# Patient Record
Sex: FEMALE | Race: BLACK OR AFRICAN AMERICAN | NOT HISPANIC OR LATINO | Employment: FULL TIME | URBAN - METROPOLITAN AREA
[De-identification: names, ages, dates, MRNs, and addresses within clinical notes are randomized per-mention and may not be internally consistent; named-entity substitution may affect disease eponyms.]

---

## 2017-01-15 ENCOUNTER — HOSPITAL ENCOUNTER (EMERGENCY)
Facility: CLINIC | Age: 27
Discharge: HOME | End: 2017-01-15
Attending: EMERGENCY MEDICINE | Admitting: EMERGENCY MEDICINE
Payer: COMMERCIAL

## 2017-01-15 VITALS
HEART RATE: 90 BPM | RESPIRATION RATE: 12 BRPM | SYSTOLIC BLOOD PRESSURE: 123 MMHG | BODY MASS INDEX: 23.3 KG/M2 | WEIGHT: 145 LBS | DIASTOLIC BLOOD PRESSURE: 81 MMHG | OXYGEN SATURATION: 99 % | TEMPERATURE: 98 F | HEIGHT: 66 IN

## 2017-01-15 DIAGNOSIS — J02.0 ACUTE STREPTOCOCCAL PHARYNGITIS: ICD-10-CM

## 2017-01-15 LAB
DEPRECATED S PYO AG THROAT QL EIA: ABNORMAL
MICRO REPORT STATUS: ABNORMAL
SPECIMEN SOURCE: ABNORMAL

## 2017-01-15 PROCEDURE — 87880 STREP A ASSAY W/OPTIC: CPT | Performed by: EMERGENCY MEDICINE

## 2017-01-15 PROCEDURE — 99283 EMERGENCY DEPT VISIT LOW MDM: CPT

## 2017-01-15 PROCEDURE — 25000130 H RX MED GY IP 250 OP 259 PS 637: Performed by: EMERGENCY MEDICINE

## 2017-01-15 RX ORDER — DIPHENHYDRAMINE HYDROCHLORIDE AND LIDOCAINE HYDROCHLORIDE AND ALUMINUM HYDROXIDE AND MAGNESIUM HYDRO
5-10 KIT EVERY 6 HOURS PRN
Qty: 237 ML | Refills: 1 | Status: SHIPPED | OUTPATIENT
Start: 2017-01-15 | End: 2019-06-15

## 2017-01-15 RX ORDER — AZITHROMYCIN 500 MG/1
TABLET, FILM COATED ORAL
Qty: 5 TABLET | Refills: 0 | Status: SHIPPED | OUTPATIENT
Start: 2017-01-15 | End: 2017-01-20

## 2017-01-15 RX ADMIN — Medication 10 MG: at 02:27

## 2017-01-15 ASSESSMENT — ENCOUNTER SYMPTOMS
FEVER: 0
SORE THROAT: 1
SHORTNESS OF BREATH: 0
COUGH: 1
VOICE CHANGE: 1

## 2017-01-15 NOTE — ED NOTES
"A&Ox4. ABC's intact. Pt c/o sore throat for about a week. Dry cough and pain in her chest when coughing. Describes it as \"discomfort\".   Took Mucinex about 2 hours ago.    "

## 2017-01-15 NOTE — ED AVS SNAPSHOT
[facility] Emergency Department    [address]    [facility] [address]    Phone:  [phone]    Fax:  [phone]                                       Rakel Shaw   MRN: [id number]    Department:  [facility] Emergency Department   Date of Visit:  1/15/2017           After Visit Summary Signature Page     I have received my discharge instructions, and my questions have been answered. I have discussed any challenges I see with this plan with the nurse or doctor.    ..........................................................................................................................................  Patient/Patient Representative Signature      ..........................................................................................................................................  Patient Representative Print Name and Relationship to Patient    ..................................................               ................................................  Date                                            Time    ..........................................................................................................................................  Reviewed by Signature/Title    ...................................................              ..............................................  Date                                                            Time

## 2017-01-15 NOTE — ED PROVIDER NOTES
"  History     Chief Complaint:  Pharyngitis      HPI   Rakel Shaw is an otherwise healthy 26 year old female who presents with sore throat and cough.  The patient states one week ago she developed a sore throat.  She reports yesterday she had onset of cough and late last night (1/14) she began to lose her voice.  The patient reports this morning whenever she would cough her chest would hurt and she decided to present to the ED.  She currently rates her pain as 2/10.  She states she has taken Mucinex for her symptoms.  The patient states she has similar sick contacts at home.  She denies any current chest pain or shortness of breath.  She denies fever.      Allergies:  Penicillins     Medications:    The patient is currently on no regular medications.      Past Medical History:    History reviewed.  No significant past medical history.     Past Surgical History:    History reviewed.  No significant past surgical history.      Family History:  History reviewed.  No significant family history.     Social History:  Relationship status: Single  The patient denies smoking.   The patient denies alcohol use.   The patient presents with a male .     Review of Systems   Constitutional: Negative for fever.   HENT: Positive for sore throat and voice change.    Respiratory: Positive for cough. Negative for shortness of breath.    Cardiovascular: Negative for chest pain.   All other systems reviewed and are negative.      Physical Exam   First Vitals:  BP: 123/81 mmHg  Heart Rate: 96  Temp: 98  F (36.7  C)  Resp: 18  Height: 167.6 cm (5' 6\")  Weight: 65.772 kg (145 lb)  SpO2: 99 %      Physical Exam   Constitutional: She appears well-developed and well-nourished.   HENT:   Right Ear: Tympanic membrane normal.   Left Ear: Tympanic membrane normal.   Mouth/Throat: Mucous membranes are normal. Posterior oropharyngeal edema and posterior oropharyngeal erythema present. No oropharyngeal exudate or tonsillar abscesses. "   Cardiovascular: Normal rate.    Pulmonary/Chest: Effort normal. No respiratory distress. She has no wheezes.   Nursing note and vitals reviewed.      Emergency Department Course     Laboratory:  Rapid strep screen: Positive    Interventions:  0227 Decadron, 10 mg, PO    ED Course:  Nursing notes and past medical history reviewed.   I performed a physical examination of the patient as documented above.  I explained the plan with the patient who consents to this.   The patient underwent the workup as described above.    I personally reviewed the laboratory results with the Patient and answered all related questions prior to discharge.   Findings and plan explained to the Patient. Patient discharged home with instructions regarding supportive care, medications, and reasons to return. The importance of close follow-up was reviewed.     Impression & Plan      Medical Decision Making:  Rakel Shaw is a 26 year old female who presents to the emergency department today with sore throat and cough.  The patient is hoarse, I suspect laryngitis; however, due to red swollen tonsils a rapid strep test was performed and is positive.   We will treat strep and have her follow up in the clinic.     Diagnosis:    ICD-10-CM   1. Acute streptococcal pharyngitis J02.0       Disposition:   Discharge to home with primary care follow up.     Discharge Medications:    AZITHROMYCIN (ZITHROMAX) 500 MG TABLET    500mg daily for 5 days    DPH-LIDO-ALHYDR-MGHYDR-SIMETH (FIRST-MOUTHWASH BLM) SUSP    Swish and swallow 5-10 mLs in mouth every 6 hours as needed         Zach ZULETA am serving as a scribe on 1/15/2017 at 1:46 AM to personally document services performed by Vignesh Troncoso MD, based on my observations and the provider's statements to me.            Vignesh Troncoso MD  01/16/17 7307

## 2017-01-15 NOTE — ED AVS SNAPSHOT
[facility] Emergency Department    [address] [place] [address]    Phone:  [phone]    Fax:  [phone]                                       Rakel Shaw   MRN: [id number]    Department:  [facility] Emergency Department   Date of Visit:  1/15/2017           Patient Information     Date Of Birth          1990        Your diagnoses for this visit were:     Acute streptococcal pharyngitis        You were seen by Vignesh Troncoso MD.      Follow-up Information     Follow up with Clinic, [facility] In 1 week.    Why:  As needed    Contact information:    [address] [facility] [id number] [phone]          Discharge Instructions         Pharyngitis: Strep (Confirmed)     You have had a positive test for strep throat. Strep throat is a contagious illness. It is spread by coughing, kissing or by touching others after touching your mouth or nose. Symptoms include throat pain which is worse with swallowing, aching all over, headache and fever. It is treated with antibiotic medication. This should help you start to feel better within 1-2 days.  Home care    Rest at home. Drink plenty of fluids to avoid dehydration.    No work or school for the first 2 days of taking the antibiotics. After this time, you will not be contagious. You can then return to school or work if you are feeling better.     The antibiotic medication must be taken for the full 10 days, even if you feel better. This is very important to ensure the infection is treated. It is also important to prevent drug-resistent organisms from developing. If you were given an antibiotic shot, no more antibiotics are needed.    You may use acetaminophen (Tylenol) or ibuprofen (Motrin, Advil) to control pain or fever, unless another medicine was prescribed for this. (NOTE: If you have chronic liver or kidney disease or ever had a stomach ulcer or GI bleeding, talk with your doctor  before using these medicines.)    Throat lozenges or sprays (such as Chloraseptic) help reduce pain. Gargling with warm salt water will also reduce throat pain. Dissolve 1/2 teaspoon of salt in 1 glass of warm water. This may be useful just before meals.     Soft foods are okay. Avoid salty or spicy foods.  Follow-up care  Follow up with your healthcare provider or our staff if you are not improving over the next week.  When to seek medical advice  Call your healthcare provider right away if any of these occur:    Fever as directed by your doctor     New or worsening ear pain, sinus pain, or headache    Painful lumps in the back of neck    Stiff neck    Lymph nodes are getting larger or becoming soft in the middle    Inability to swallow liquids, excessive drooling, or inability to open mouth wide due to throat pain    Signs of dehydration (very dark urine or no urine, sunken eyes, dizziness)    Trouble breathing or noisy breathing    Muffled voice    New rash    6815-5539 The [employer]. [address] [place] [address]. All rights reserved. This information is not intended as a substitute for professional medical care. Always follow your healthcare professional's instructions.          24 Hour Appointment Hotline       To make an appointment at any [facility] clinic, call [phone] ([phone]). If you don't have a family doctor or clinic, we will help you find one. [facility] clinics are conveniently located to serve the needs of you and your family.             Review of your medicines      START taking        Dose / Directions Last dose taken    azithromycin 500 MG tablet   Commonly known as:  ZITHROMAX Z-DIALLO   Quantity:  5 tablet        500mg daily for 5 days   Refills:  0        FIRST-MOUTHWASH BLM Susp   Dose:  5-10 mL   Quantity:  237 mL        Swish and swallow 5-10 mLs in mouth every 6 hours as needed   Refills:  1          Our records show that you are taking the medicines listed  below. If these are incorrect, please call your family doctor or clinic.        Dose / Directions Last dose taken    NO ACTIVE MEDICATIONS        .   Refills:  0        POLYTRIM ophthalmic solution   Quantity:  QS   Generic drug:  trimethoprim-polymyxin b        1-2 gtt OU every 2-3hours x 24 hours, then QID x 5-7 days   Refills:  0                Prescriptions were sent or printed at these locations (2 Prescriptions)                   Other Prescriptions                Printed at Department/Unit printer (2 of 2)         DPH-Lido-AlHydr-MgHydr-Simeth (FIRST-MOUTHWASH BLM) SUSP               azithromycin (ZITHROMAX) 500 MG tablet                Procedures and tests performed during your visit     Rapid strep screen      Orders Needing Specimen Collection     None      Pending Results     No orders found from 1/14/2017 to 1/16/2017.            Pending Culture Results     No orders found from 1/14/2017 to 1/16/2017.       Test Results from your hospital stay           1/15/2017  2:17 AM - Interface, [employer] Results      Component Results     Component    Specimen Description    Throat    Rapid Strep A Screen (Abnormal)    POSITIVE: Group A Streptococcal antigen detected by immunoassay.    Micro Report Status    FINAL 01/15/2017                Clinical Quality Measure: Blood Pressure Screening     Your blood pressure was checked while you were in the emergency department today. The last reading we obtained was  BP: 123/81 mmHg . Please read the guidelines below about what these numbers mean and what you should do about them.  If your systolic blood pressure (the top number) is less than 120 and your diastolic blood pressure (the bottom number) is less than 80, then your blood pressure is normal. There is nothing more that you need to do about it.  If your systolic blood pressure (the top number) is 120-139 or your diastolic blood pressure (the bottom number) is 80-89, your blood pressure may be higher than it should  "be. You should have your blood pressure rechecked within a year by a primary care provider.  If your systolic blood pressure (the top number) is 140 or greater or your diastolic blood pressure (the bottom number) is 90 or greater, you may have high blood pressure. High blood pressure is treatable, but if left untreated over time it can put you at risk for heart attack, stroke, or kidney failure. You should have your blood pressure rechecked by a primary care provider within the next 4 weeks.  If your provider in the emergency department today gave you specific instructions to follow-up with your doctor or provider even sooner than that, you should follow that instruction and not wait for up to 4 weeks for your follow-up visit.        Thank you for choosing [facility]       Thank you for choosing [facility] for your care. Our goal is always to provide you with excellent care. Hearing back from our patients is one way we can continue to improve our services. Please take a few minutes to complete the written survey that you may receive in the mail after you visit with us. Thank you!        [employer] Information     [employer] lets you send messages to your doctor, view your test results, renew your prescriptions, schedule appointments and more. To sign up, go to [email or web address] . Click on \"Log in\" on the left side of the screen, which will take you to the Welcome page. Then click on \"Sign up Now\" on the right side of the page.     You will be asked to enter the access code listed below, as well as some personal information. Please follow the directions to create your username and password.     Your access code is: [email or web address]  Expires: 4/15/2017  2:27 AM     Your access code will  in 90 days. If you need help or a new code, please call your [facility] clinic or [phone].        Care EveryWhere ID     This is your Care EveryWhere ID. This could be used by other organizations to access your [facility] medical " records  [id number]        After Visit Summary       This is your record. Keep this with you and show to your community pharmacist(s) and doctor(s) at your next visit.

## 2017-01-15 NOTE — DISCHARGE INSTRUCTIONS
Pharyngitis: Strep (Confirmed)     You have had a positive test for strep throat. Strep throat is a contagious illness. It is spread by coughing, kissing or by touching others after touching your mouth or nose. Symptoms include throat pain which is worse with swallowing, aching all over, headache and fever. It is treated with antibiotic medication. This should help you start to feel better within 1-2 days.  Home care    Rest at home. Drink plenty of fluids to avoid dehydration.    No work or school for the first 2 days of taking the antibiotics. After this time, you will not be contagious. You can then return to school or work if you are feeling better.     The antibiotic medication must be taken for the full 10 days, even if you feel better. This is very important to ensure the infection is treated. It is also important to prevent drug-resistent organisms from developing. If you were given an antibiotic shot, no more antibiotics are needed.    You may use acetaminophen (Tylenol) or ibuprofen (Motrin, Advil) to control pain or fever, unless another medicine was prescribed for this. (NOTE: If you have chronic liver or kidney disease or ever had a stomach ulcer or GI bleeding, talk with your doctor before using these medicines.)    Throat lozenges or sprays (such as Chloraseptic) help reduce pain. Gargling with warm salt water will also reduce throat pain. Dissolve 1/2 teaspoon of salt in 1 glass of warm water. This may be useful just before meals.     Soft foods are okay. Avoid salty or spicy foods.  Follow-up care  Follow up with your healthcare provider or our staff if you are not improving over the next week.  When to seek medical advice  Call your healthcare provider right away if any of these occur:    Fever as directed by your doctor     New or worsening ear pain, sinus pain, or headache    Painful lumps in the back of neck    Stiff neck    Lymph nodes are getting larger or becoming soft in the  middle    Inability to swallow liquids, excessive drooling, or inability to open mouth wide due to throat pain    Signs of dehydration (very dark urine or no urine, sunken eyes, dizziness)    Trouble breathing or noisy breathing    Muffled voice    New rash    1996-9250 [employer]. [address] [place] [address]. All rights reserved. This information is not intended as a substitute for professional medical care. Always follow your healthcare professional's instructions.

## 2019-06-15 ENCOUNTER — HOSPITAL ENCOUNTER (INPATIENT)
Facility: CLINIC | Age: 29
LOS: 1 days | Discharge: HOME | DRG: 558 | End: 2019-06-17
Attending: EMERGENCY MEDICINE | Admitting: INTERNAL MEDICINE
Payer: COMMERCIAL

## 2019-06-15 DIAGNOSIS — M62.82 NON-TRAUMATIC RHABDOMYOLYSIS: ICD-10-CM

## 2019-06-15 DIAGNOSIS — T79.6XXA TRAUMATIC RHABDOMYOLYSIS, INITIAL ENCOUNTER (H): Primary | ICD-10-CM

## 2019-06-15 LAB
ALBUMIN UR-MCNC: NEGATIVE MG/DL
ANION GAP SERPL CALCULATED.3IONS-SCNC: 7 MMOL/L (ref 3–14)
APPEARANCE UR: CLEAR
BACTERIA #/AREA URNS HPF: ABNORMAL /HPF
BILIRUB UR QL STRIP: NEGATIVE
BUN SERPL-MCNC: 10 MG/DL (ref 7–30)
CALCIUM SERPL-MCNC: 8.7 MG/DL (ref 8.5–10.1)
CHLORIDE SERPL-SCNC: 105 MMOL/L (ref 94–109)
CK SERPL-CCNC: ABNORMAL U/L (ref 30–225)
CO2 SERPL-SCNC: 28 MMOL/L (ref 20–32)
COLOR UR AUTO: YELLOW
CREAT SERPL-MCNC: 0.73 MG/DL (ref 0.52–1.04)
GFR SERPL CREATININE-BSD FRML MDRD: >90 ML/MIN/{1.73_M2}
GLUCOSE SERPL-MCNC: 87 MG/DL (ref 70–99)
GLUCOSE UR STRIP-MCNC: NEGATIVE MG/DL
HGB UR QL STRIP: ABNORMAL
KETONES UR STRIP-MCNC: NEGATIVE MG/DL
LEUKOCYTE ESTERASE UR QL STRIP: NEGATIVE
MUCOUS THREADS #/AREA URNS LPF: PRESENT /LPF
NITRATE UR QL: NEGATIVE
PH UR STRIP: 5.5 PH (ref 5–7)
POTASSIUM SERPL-SCNC: 4 MMOL/L (ref 3.4–5.3)
RBC #/AREA URNS AUTO: 9 /HPF (ref 0–2)
SODIUM SERPL-SCNC: 140 MMOL/L (ref 133–144)
SOURCE: ABNORMAL
SP GR UR STRIP: 1.03 (ref 1–1.03)
SQUAMOUS #/AREA URNS AUTO: 1 /HPF (ref 0–1)
UROBILINOGEN UR STRIP-MCNC: 2 MG/DL (ref 0–2)
WBC #/AREA URNS AUTO: 1 /HPF (ref 0–5)

## 2019-06-15 PROCEDURE — 96361 HYDRATE IV INFUSION ADD-ON: CPT

## 2019-06-15 PROCEDURE — 85652 RBC SED RATE AUTOMATED: CPT | Performed by: PHYSICIAN ASSISTANT

## 2019-06-15 PROCEDURE — 82550 ASSAY OF CK (CPK): CPT | Performed by: EMERGENCY MEDICINE

## 2019-06-15 PROCEDURE — 25000132 ZZH RX MED GY IP 250 OP 250 PS 637: Performed by: EMERGENCY MEDICINE

## 2019-06-15 PROCEDURE — 86140 C-REACTIVE PROTEIN: CPT | Performed by: PHYSICIAN ASSISTANT

## 2019-06-15 PROCEDURE — 99285 EMERGENCY DEPT VISIT HI MDM: CPT | Mod: 25

## 2019-06-15 PROCEDURE — 96360 HYDRATION IV INFUSION INIT: CPT

## 2019-06-15 PROCEDURE — 82550 ASSAY OF CK (CPK): CPT | Performed by: PHYSICIAN ASSISTANT

## 2019-06-15 PROCEDURE — 36415 COLL VENOUS BLD VENIPUNCTURE: CPT | Performed by: PHYSICIAN ASSISTANT

## 2019-06-15 PROCEDURE — 80048 BASIC METABOLIC PNL TOTAL CA: CPT | Performed by: EMERGENCY MEDICINE

## 2019-06-15 PROCEDURE — 81001 URINALYSIS AUTO W/SCOPE: CPT | Performed by: EMERGENCY MEDICINE

## 2019-06-15 PROCEDURE — 25800030 ZZH RX IP 258 OP 636: Performed by: EMERGENCY MEDICINE

## 2019-06-15 RX ORDER — SPIRONOLACTONE 50 MG/1
50 TABLET, FILM COATED ORAL DAILY
COMMUNITY
End: 2020-01-24

## 2019-06-15 RX ORDER — IBUPROFEN 600 MG/1
600 TABLET, FILM COATED ORAL ONCE
Status: COMPLETED | OUTPATIENT
Start: 2019-06-15 | End: 2019-06-15

## 2019-06-15 RX ORDER — ACETAMINOPHEN 500 MG
1000 TABLET ORAL ONCE
Status: COMPLETED | OUTPATIENT
Start: 2019-06-15 | End: 2019-06-15

## 2019-06-15 RX ORDER — MINOCYCLINE HYDROCHLORIDE 100 MG/1
100 TABLET ORAL 2 TIMES DAILY
COMMUNITY
End: 2020-01-24

## 2019-06-15 RX ORDER — ONDANSETRON 2 MG/ML
INJECTION INTRAMUSCULAR; INTRAVENOUS
Status: DISCONTINUED
Start: 2019-06-15 | End: 2019-06-15 | Stop reason: HOSPADM

## 2019-06-15 RX ADMIN — ACETAMINOPHEN 1000 MG: 500 TABLET, FILM COATED ORAL at 19:20

## 2019-06-15 RX ADMIN — SODIUM CHLORIDE, POTASSIUM CHLORIDE, SODIUM LACTATE AND CALCIUM CHLORIDE 1000 ML: 600; 310; 30; 20 INJECTION, SOLUTION INTRAVENOUS at 21:44

## 2019-06-15 RX ADMIN — IBUPROFEN 600 MG: 600 TABLET ORAL at 19:20

## 2019-06-15 ASSESSMENT — ENCOUNTER SYMPTOMS: ARTHRALGIAS: 1

## 2019-06-15 ASSESSMENT — MIFFLIN-ST. JEOR: SCORE: 1512.75

## 2019-06-15 NOTE — ED TRIAGE NOTES
Presents to the ED with left arm pain and stiffness. Denies specific known injury, but states began after doing cross fit.

## 2019-06-15 NOTE — ED PROVIDER NOTES
History     Chief Complaint:  Arm Pain    HPI   Rakel Shaw is a 28 year old left hand dominant female who presents with left arm pain and stiffness. The patient denies any specific known injury, but does state is started after doing cross fit yesterday. She states that she slept on it, iced it, and took tylenol and ibuprofen but it did not seem to get better which is why she reports to the ED today. The patient states that the left elbow does not bend, and feels that it is tight.    Allergies:  Penicillins     Medications:    The patient is not currently taking any prescribed medications.    Past Medical History:    Anemia  Fibroadenoma of left breast    Past Surgical History:    The patient does not have any pertinent past surgical history.    Family History:    Heart Disease  HTN   Cancer  Diabetes  Thyroid    Social History:  Negative for tobacco use.  Negative for alcohol use.  Marital Status:  Single [1]       Review of Systems   Musculoskeletal: Positive for arthralgias (left arm).   All other systems reviewed and are negative.        Physical Exam     Patient Vitals for the past 24 hrs:   BP Temp Pulse Resp SpO2   06/15/19 1830 142/75 97.9  F (36.6  C) 68 16 98 %       Physical Exam    HEENT:  mmm  Neck: supple  CV: ppi, regular   Resp: speaking in full sentences with any resp distress    Ext: Skeletal survey unremarkable with exception of left upper extremity where there is tenderness over the triceps muscle of the left upper extremity.  Full extension at the elbow range of motion intact at the shoulder but limited flexion to 30 degrees secondary to pain.  Distal CMS intact compartments are not full or tense.  No elbow joint effusion.  Skin: warm dry well perfused  Neuro: Alert, no gross motor or sensory deficits,  gait stable        Emergency Department Course   Laboratory:  BMP: Glucose 87, o/w WNL (Creatinine: 0.73)    CK Total: >17k    Labs Ordered and Resulted from Time of ED Arrival Up to the Time of  Departure from the ED   CK TOTAL - Abnormal; Notable for the following components:       Result Value    CK Total 17,692 (*)     All other components within normal limits   ROUTINE UA WITH MICROSCOPIC - Abnormal; Notable for the following components:    Blood Urine Small (*)     RBC Urine 9 (*)     Bacteria Urine Few (*)     Mucous Urine Present (*)     All other components within normal limits   BASIC METABOLIC PANEL         Interventions:  1920 Tylenol 1000 mg PO   Ibuprofen 600 mg PO    Emergency Department Course:  Nursing notes and vitals reviewed. (190) I performed an exam of the patient as documented above.      IV inserted. Medicine administered as documented above. Blood drawn. This was sent to the lab for further testing, results above.         Impression & Plan    Medical Decision Makin-year-old female here with likely overuse injury with tenderness and tightness of the left triceps muscle.  There is also tenderness of the right upper extremity over the triceps but there is no limited range of motion.  Concern was for rhabdomyolysis which unfortunately he is present with a CK of greater than 17,000.  Fortunately her BMP shows no kidney injury and her urine only has small blood.Given that level of CK rising putting her in the.  She was comfortable and agree with that plan.  Area for potential kidney injury recommended admission for aggressive hydration          Diagnosis:    ICD-10-CM    1. Non-traumatic rhabdomyolysis M62.82 CK total     CRP inflammation     CRP inflammation     Erythrocyte sedimentation rate auto     Erythrocyte sedimentation rate auto     CANCELED: CRP inflammation     CANCELED: Erythrocyte sedimentation rate auto       Disposition:  Admitted    Discharge Medications:     Medication List      There are no discharge medications for this visit.       Scribe Disclosure:  Ciera ZULETA, am serving as a scribe on 6/15/2019 at 6:33 PM to personally document services performed by  Adriano Tai, * based on my observations and the provider's statements to me.       Ciera Monson  6/15/2019   [facility] EMERGENCY DEPARTMENT      Adriano Tai MD  06/16/19 0103

## 2019-06-16 ENCOUNTER — APPOINTMENT (OUTPATIENT)
Dept: ULTRASOUND IMAGING | Facility: CLINIC | Age: 29
DRG: 558 | End: 2019-06-16
Attending: PHYSICIAN ASSISTANT
Payer: COMMERCIAL

## 2019-06-16 PROBLEM — M62.82 RHABDOMYOLYSIS: Status: ACTIVE | Noted: 2019-06-16

## 2019-06-16 LAB
ALBUMIN SERPL-MCNC: 2.8 G/DL (ref 3.4–5)
ALBUMIN UR-MCNC: NEGATIVE MG/DL
ALP SERPL-CCNC: 42 U/L (ref 40–150)
ALT SERPL W P-5'-P-CCNC: 87 U/L (ref 0–50)
ANION GAP SERPL CALCULATED.3IONS-SCNC: 7 MMOL/L (ref 3–14)
APPEARANCE UR: CLEAR
AST SERPL W P-5'-P-CCNC: 360 U/L (ref 0–45)
BACTERIA #/AREA URNS HPF: ABNORMAL /HPF
BILIRUB SERPL-MCNC: 0.7 MG/DL (ref 0.2–1.3)
BILIRUB UR QL STRIP: NEGATIVE
BUN SERPL-MCNC: 7 MG/DL (ref 7–30)
CALCIUM SERPL-MCNC: 8.2 MG/DL (ref 8.5–10.1)
CHLORIDE SERPL-SCNC: 109 MMOL/L (ref 94–109)
CK SERPL-CCNC: ABNORMAL U/L (ref 30–225)
CO2 SERPL-SCNC: 25 MMOL/L (ref 20–32)
COLOR UR AUTO: ABNORMAL
CREAT SERPL-MCNC: 0.63 MG/DL (ref 0.52–1.04)
CRP SERPL-MCNC: <2.9 MG/L (ref 0–8)
ERYTHROCYTE [SEDIMENTATION RATE] IN BLOOD BY WESTERGREN METHOD: 9 MM/H (ref 0–20)
GFR SERPL CREATININE-BSD FRML MDRD: >90 ML/MIN/{1.73_M2}
GLUCOSE SERPL-MCNC: 86 MG/DL (ref 70–99)
GLUCOSE UR STRIP-MCNC: NEGATIVE MG/DL
HGB UR QL STRIP: NEGATIVE
KETONES UR STRIP-MCNC: NEGATIVE MG/DL
LEUKOCYTE ESTERASE UR QL STRIP: NEGATIVE
NITRATE UR QL: NEGATIVE
PH UR STRIP: 7 PH (ref 5–7)
POTASSIUM SERPL-SCNC: 3.9 MMOL/L (ref 3.4–5.3)
PROT SERPL-MCNC: 6.4 G/DL (ref 6.8–8.8)
RBC #/AREA URNS AUTO: 1 /HPF (ref 0–2)
SODIUM SERPL-SCNC: 141 MMOL/L (ref 133–144)
SOURCE: ABNORMAL
SP GR UR STRIP: 1.01 (ref 1–1.03)
UROBILINOGEN UR STRIP-MCNC: NORMAL MG/DL (ref 0–2)
WBC #/AREA URNS AUTO: 0 /HPF (ref 0–5)

## 2019-06-16 PROCEDURE — 25000132 ZZH RX MED GY IP 250 OP 250 PS 637: Performed by: PHYSICIAN ASSISTANT

## 2019-06-16 PROCEDURE — 12000011 ZZH R&B MS OVERFLOW

## 2019-06-16 PROCEDURE — 81001 URINALYSIS AUTO W/SCOPE: CPT | Performed by: PHYSICIAN ASSISTANT

## 2019-06-16 PROCEDURE — 25800030 ZZH RX IP 258 OP 636: Performed by: PHYSICIAN ASSISTANT

## 2019-06-16 PROCEDURE — 96360 HYDRATION IV INFUSION INIT: CPT

## 2019-06-16 PROCEDURE — 96361 HYDRATE IV INFUSION ADD-ON: CPT

## 2019-06-16 PROCEDURE — 36415 COLL VENOUS BLD VENIPUNCTURE: CPT | Performed by: PHYSICIAN ASSISTANT

## 2019-06-16 PROCEDURE — 80053 COMPREHEN METABOLIC PANEL: CPT | Performed by: PHYSICIAN ASSISTANT

## 2019-06-16 PROCEDURE — 99207 ZZC CDG-CODE CATEGORY CHANGED: CPT | Performed by: PHYSICIAN ASSISTANT

## 2019-06-16 PROCEDURE — 25000128 H RX IP 250 OP 636: Performed by: PHYSICIAN ASSISTANT

## 2019-06-16 PROCEDURE — G0378 HOSPITAL OBSERVATION PER HR: HCPCS

## 2019-06-16 PROCEDURE — 82550 ASSAY OF CK (CPK): CPT | Performed by: PHYSICIAN ASSISTANT

## 2019-06-16 PROCEDURE — 99222 1ST HOSP IP/OBS MODERATE 55: CPT | Mod: AI | Performed by: PHYSICIAN ASSISTANT

## 2019-06-16 PROCEDURE — 93971 EXTREMITY STUDY: CPT | Mod: LT

## 2019-06-16 RX ORDER — ONDANSETRON 4 MG/1
4 TABLET, ORALLY DISINTEGRATING ORAL EVERY 6 HOURS PRN
Status: DISCONTINUED | OUTPATIENT
Start: 2019-06-16 | End: 2019-06-17 | Stop reason: HOSPADM

## 2019-06-16 RX ORDER — SODIUM CHLORIDE 9 MG/ML
INJECTION, SOLUTION INTRAVENOUS CONTINUOUS
Status: DISCONTINUED | OUTPATIENT
Start: 2019-06-16 | End: 2019-06-17 | Stop reason: HOSPADM

## 2019-06-16 RX ORDER — NALOXONE HYDROCHLORIDE 0.4 MG/ML
.1-.4 INJECTION, SOLUTION INTRAMUSCULAR; INTRAVENOUS; SUBCUTANEOUS
Status: DISCONTINUED | OUTPATIENT
Start: 2019-06-16 | End: 2019-06-17 | Stop reason: HOSPADM

## 2019-06-16 RX ORDER — IBUPROFEN 600 MG/1
600 TABLET, FILM COATED ORAL EVERY 6 HOURS PRN
Status: DISCONTINUED | OUTPATIENT
Start: 2019-06-16 | End: 2019-06-16

## 2019-06-16 RX ORDER — AMOXICILLIN 250 MG
1 CAPSULE ORAL 2 TIMES DAILY
Status: DISCONTINUED | OUTPATIENT
Start: 2019-06-16 | End: 2019-06-17 | Stop reason: HOSPADM

## 2019-06-16 RX ORDER — ACETAMINOPHEN 325 MG/1
650 TABLET ORAL EVERY 4 HOURS PRN
Status: DISCONTINUED | OUTPATIENT
Start: 2019-06-16 | End: 2019-06-16

## 2019-06-16 RX ORDER — ONDANSETRON 2 MG/ML
4 INJECTION INTRAMUSCULAR; INTRAVENOUS EVERY 6 HOURS PRN
Status: DISCONTINUED | OUTPATIENT
Start: 2019-06-16 | End: 2019-06-17 | Stop reason: HOSPADM

## 2019-06-16 RX ORDER — OXYCODONE HYDROCHLORIDE 5 MG/1
5 TABLET ORAL EVERY 4 HOURS PRN
Status: DISCONTINUED | OUTPATIENT
Start: 2019-06-16 | End: 2019-06-17 | Stop reason: HOSPADM

## 2019-06-16 RX ORDER — AMOXICILLIN 250 MG
2 CAPSULE ORAL 2 TIMES DAILY
Status: DISCONTINUED | OUTPATIENT
Start: 2019-06-16 | End: 2019-06-17 | Stop reason: HOSPADM

## 2019-06-16 RX ADMIN — SODIUM CHLORIDE 1000 ML: 9 INJECTION, SOLUTION INTRAVENOUS at 12:49

## 2019-06-16 RX ADMIN — SODIUM CHLORIDE: 9 INJECTION, SOLUTION INTRAVENOUS at 14:59

## 2019-06-16 RX ADMIN — IBUPROFEN 600 MG: 600 TABLET ORAL at 09:16

## 2019-06-16 RX ADMIN — SODIUM CHLORIDE: 9 INJECTION, SOLUTION INTRAVENOUS at 20:54

## 2019-06-16 RX ADMIN — IBUPROFEN 600 MG: 600 TABLET ORAL at 00:36

## 2019-06-16 RX ADMIN — SODIUM CHLORIDE: 9 INJECTION, SOLUTION INTRAVENOUS at 00:36

## 2019-06-16 RX ADMIN — SENNOSIDES AND DOCUSATE SODIUM 1 TABLET: 8.6; 5 TABLET ORAL at 09:05

## 2019-06-16 RX ADMIN — SENNOSIDES AND DOCUSATE SODIUM 1 TABLET: 8.6; 5 TABLET ORAL at 20:49

## 2019-06-16 RX ADMIN — IBUPROFEN 600 MG: 600 TABLET ORAL at 16:58

## 2019-06-16 NOTE — PROVIDER NOTIFICATION
Notified provider via text page of critical lab result CK 13,520. This result is lower then was but text page sent to update provider.

## 2019-06-16 NOTE — PLAN OF CARE
VSS, A/O. Independent in room. C/O LUE pain, ibuprofen given, oxy available and acetaminophen d/c'd due to elevated LFTs. IVF increased to 200ml/h.  1L NS bolus ordered, CK increased tro 19,673. Ortho sx consulted.

## 2019-06-16 NOTE — PLAN OF CARE
ROOM # 233    Living Situation (if not independent, order SW consult):Ind  Facility name:  : father Cassidy    Activity level at baseline: ind  Activity level on admit: ind      Patient registered to observation; given Patient Bill of Rights; given the opportunity to ask questions about observation status and their plan of care.  Patient has been oriented to the observation room, bathroom and call light is in place.    Discussed discharge goals and expectations with patient/family.

## 2019-06-16 NOTE — ED NOTES
"[facility]  ED Nurse Handoff Report    Rakel Shaw is a 28 year old female   ED Chief complaint: Arm Pain  . ED Diagnosis:   Final diagnoses:   Non-traumatic rhabdomyolysis     Allergies:   Allergies   Allergen Reactions     Penicillins        Code Status: Full Code  Activity level - Baseline/Home:  Independent. Activity Level - Current:   Independent. Lift room needed: No. Bariatric: No   Needed: No   Isolation: No. Infection: Not Applicable.     Vital Signs:   Vitals:    06/15/19 1830 06/15/19 2200   BP: 142/75 132/84   Pulse: 68 68   Resp: 16    Temp: 97.9  F (36.6  C)    SpO2: 98%        Cardiac Rhythm:  ,      Pain level: 0-10 Pain Scale: 8  Patient confused: No. Patient Falls Risk: No.   Elimination Status: Has voided   Patient Report - Initial Complaint: Arm pain. Focused Assessment:    Presents to the ED with left arm pain and stiffness. Denies specific known injury, but states began after doing cross fit.      Pt reports unable to perform abduction on LUE, pt reporting pain in L bicep, pt reports for that she has been doing \"intense\" crossfit workouts the last few days; CMS intact   Tests Performed:   No orders to display     . Abnormal Results:   Labs Ordered and Resulted from Time of ED Arrival Up to the Time of Departure from the ED   CK TOTAL - Abnormal; Notable for the following components:       Result Value    CK Total 17,692 (*)     All other components within normal limits   ROUTINE UA WITH MICROSCOPIC - Abnormal; Notable for the following components:    Blood Urine Small (*)     RBC Urine 9 (*)     Bacteria Urine Few (*)     Mucous Urine Present (*)     All other components within normal limits   BASIC METABOLIC PANEL     .   Treatments provided: IVF, labs   Family Comments: Family at bedside   OBS brochure/video discussed/provided to patient:  Yes  ED Medications:   Medications   0.9% sodium chloride BOLUS (has no administration in time range)   lactated ringers BOLUS 1,000 " mL (1,000 mLs Intravenous New Bag 6/15/19 2149)   ondansetron (ZOFRAN) 2 MG/ML injection (has no administration in time range)   acetaminophen (TYLENOL) tablet 1,000 mg (1,000 mg Oral Given 6/15/19 1920)   ibuprofen (ADVIL/MOTRIN) tablet 600 mg (600 mg Oral Given 6/15/19 1920)     Drips infusing:  Yes  For the majority of the shift, the patient's behavior Green. Interventions performed were N/A.     Severe Sepsis OR Septic Shock Diagnosis Present: No      ED Nurse Name/Phone Number: Criss GEOVANNA Cramer,   10:38 PM    RECEIVING UNIT ED HANDOFF REVIEW    Above ED Nurse Handoff Report was reviewed: Yes  Reviewed by: Sp Guerrero on Jane 15, 2019 at 10:50 PM

## 2019-06-16 NOTE — H&P
History and Physical     Rakel Shaw MRN# [id number]   YOB: 1990 Age: 28 year old      Date of Admission:  6/15/2019    Primary care provider: [facility], [place]          Assessment and Plan:   Rakel Shaw is a 28 year old female with a PMH significant for iron deficiency anemia (not currently on supplements) and o/w healthy female (no hormones,not on any supplements, who presents with waxing and waning left arm pain since thurs. Today she noticed that her left arm was so swollen down to her hands and her triceps were particularly painful to move and tender to touch to the point that she could not bend her arm. She has been consistently working on [employer] for about a year and has been progressively using heavier weights etc. She doesn't remember injuring her left arm per se but her  has told her that her left arm is weaker than the other.   She came to ED and labs reveal a significantly high CK suggestive of rhabdomyolysis.  Fortunately no renal involvement. I have been asked to admit her for aggressive fluid resuscitation and observation of her CK trend and Cr status.     1.  Left arm pain with associated swelling-elevated CK suggestive of rhabdomyolysis but history still seems somewhat unclear.  She has been doing [employer] for quite some time and has not had any significant change though she is trying to increase her weights.  Currently there is no evidence of compartment syndrome.  She states that her symptoms have felt a lot better since receiving IV fluid.  I think for completeness sake I will order a Doppler ultrasound of the left arm just to make sure that this is not a DVT that is causing her pain.  Otherwise continue aggressive IV fluid hydration, follow CK and urinalysis in the morning.  I will also add sed rate and CRP to rule out potential myositis.    2.  History of iron deficiency anemia-patient states she is no longer taking iron.  Check her hemoglobin in the morning.      Registered to observation  CODE STATUS-full  DVT prophylaxis-anticipate short stay, ambulate                  Chief Complaint:   Left arm pain         History of Present Illness:   Rakel Shaw is a 28 year old female without significant past medical history except for iron deficiency anemia who presents with couple day complain of worsening left arm pain.  History obtained by speaking with the ER physician patient interview and chart review.  Patient states that she noticed some soreness in both of her arms on Thursday I will be at the left was little bit more severe.  She has been working out consistently at [employer] for the last year and over these last few sessions she has been increasing her weight during these exercises.  She did not think much of it until by yesterday evening she was having increasing swelling in her left arm mainly along the triceps along with swelling of her left hand.  Her left arm became quite tense and taut and was even painful to lift up or bend at the elbow.  Due to her extreme pain she comes into the emergency room.    Work-up in the emergency room was fairly unremarkable with the exception of a significantly elevated CK at 17,000.  She has no history of rhabdomyolysis in the past.  She is on no nutritional supplements, increase protein supplements either.  She is not on any birth control or hormones or any history of DVT.  She received a liter of IV fluid and since then she states that her pain has improved and the swelling and tenseness of the left arm have improved.             Past Medical History:   Iron deficiency anemia          Past Surgical History:   Reviewed           Social History:     Social History     Socioeconomic History     Marital status: Single     Spouse name: Not on file     Number of children: Not on file     Years of education: Not on file     Highest education level: Not on file   Occupational History     Not on file   Social Needs     Financial resource  "strain: Not on file     Food insecurity:     Worry: Not on file     Inability: Not on file     Transportation needs:     Medical: Not on file     Non-medical: Not on file   Tobacco Use     Smoking status: Never Smoker   Substance and Sexual Activity     Alcohol use: No     Drug use: No     Sexual activity: Not on file   Lifestyle     Physical activity:     Days per week: Not on file     Minutes per session: Not on file     Stress: Not on file   Relationships     Social connections:     Talks on phone: Not on file     Gets together: Not on file     Attends Orthodoxy service: Not on file     Active member of club or organization: Not on file     Attends meetings of clubs or organizations: Not on file     Relationship status: Not on file     Intimate partner violence:     Fear of current or ex partner: Not on file     Emotionally abused: Not on file     Physically abused: Not on file     Forced sexual activity: Not on file   Other Topics Concern     Not on file   Social History Narrative     Not on file               Family History:   Reviewed and non contributory         Allergies:      Allergies   Allergen Reactions     Penicillins                Medications:     Prior to Admission medications    Medication Sig Last Dose Taking? Auth Provider   minocycline (DYNACIN) 100 MG tablet Take 100 mg by mouth 2 times daily Past Week at Unknown time Yes Unknown, Entered By History   spironolactone (ALDACTONE) 50 MG tablet Take 50 mg by mouth daily Past Week at Unknown time Yes Unknown, Entered By History              Review of Systems:   A Comprehensive greater than 10 system review of systems was carried out.  Pertinent positives and negatives are noted above.  Otherwise negative for contributory information.            Physical Exam:   Blood pressure 117/74, pulse 66, temperature 98.2  F (36.8  C), temperature source Oral, resp. rate 16, height 1.676 m (5' 6\"), weight 76.1 kg (167 lb 12.3 oz), SpO2 100 %, not currently " breastfeeding.  Exam:  GENERAL:  Comfortable. Non toxic  PSYCH: pleasant, oriented, No acute distress.  HEENT:  PERRLA. Normal conjunctiva, normal hearing, nasal mucosa and Oropharynx are normal.  NECK:  Supple, no neck vein distention, adenopathy or bruits, normal thyroid.  HEART:  Normal S1, S2 with no murmur, no pericardial rub, gallops or S3 or S4.  LUNGS:  Clear to auscultation, normal Respiratory effort. No wheezing, rales or ronchi.  ABDOMEN:  Soft, no hepatosplenomegaly, normal bowel sounds. Non-tender, non distended.   EXTREMITIES:  No pedal edema, +2 pulses bilateral and equal. Left tricep with a sense of fullness but compartment is soft and no significant tenderness with palpation. Very slight increased warmth. Dec ROM around the left arm and left elbow but no actual pain in the elbow joint.   SKIN:  Dry to touch, No rash, wound or ulcerations.  NEUROLOGIC:  CN 2-12 intact, BL 5/5 symmetric upper and lower extremity strength, sensation is intact with no focal deficits.           Data:     No results for input(s): WBC, HGB, HCT, MCV, PLT in the last 168 hours.  Recent Labs   Lab 06/15/19  1916      POTASSIUM 4.0   CHLORIDE 105   CO2 28   ANIONGAP 7   GLC 87   BUN 10   CR 0.73   GFRESTIMATED >90   GFRESTBLACK >90   AYLEEN 8.7     No results for input(s): CULT in the last 168 hours.  Recent Labs   Lab 06/15/19  1916   CKT 17,692*     No results for input(s): SED, CRP in the last 168 hours.      Results for orders placed or performed in visit on 04/10/07   RT X-RAY KNEE 3 VIEW    Impression       History:    Reduction of patellar dislocation.     Findings: The patella is now properly located in the femoral sulcus.  No evidence of fracture. Small effusion.         Sabi Goins PA-C

## 2019-06-16 NOTE — PLAN OF CARE
PRIMARY DIAGNOSIS: Rhabdomyolysis   OUTPATIENT/OBSERVATION GOALS TO BE MET BEFORE DISCHARGE:  1. ADLs back to baseline: Yes     2. Activity and level of assistance: Ambulating independently.     3. Pain status: Improved-controlled with oral pain medications.     4. Return to near baseline physical activity: Yes          Discharge Planner Nurse   Safe discharge environment identified: Yes  Barriers to discharge: Yes- CK elevated, pain management, unable to bend left elbow.        No acute events overnight. Pt given advil for 8/10 left arm pain. No further c/o pain overnight. Will continue to monitor.     Entered by: pS Guerrero 06/16/2019 4:16 AM  Please review provider order for any additional goals.   Nurse to notify provider when observation goals have been met and patient is ready for discharge

## 2019-06-16 NOTE — PROGRESS NOTES
[facility]    Medicine Progress Note - Hospitalist Service       Date of Admission:  6/15/2019  Assessment & Plan   Rakel Shaw is a 28 year old female with a PMH significant for iron deficiency anemia (not currently on supplements) and o/w healthy female (no hormones,not on any supplements, who presents with waxing and waning left posterior upper arm pain since Thursday with associated swelling. She has been consistently working on [employer] for about a year and has been progressively using heavier weights with particular focus on upper extremities this week; also has not been keeping up on PO hydration/eating.     She came to ED and labs revealed a significantly high CK suggestive of rhabdomyolysis.  Fortunately no renal involvement. She was admitted to observation status for aggressive IVF hydration and to trend her CK and Cr status.     1. Acute rhabdomyolysis, left arm pain with associated swelling: Elevated CK suggestive of rhabdomyolysis.  She has been doing [employer] for quite some time and has not had any significant change though she is trying to increase her weights.  Her Cr has remained stable, but unfortunately her CK has continued to rise today, so would not recommend discharge until starting to trend back down. Currently there is no evidence of compartment syndrome, no numbness/tingling or tenseness over left tricep area where most of her pain is.  Ortho consulted today given rising CK-total and verified no signs of compartment syndrome. Doppler ultrasound of the left arm negative for DVT. n.  Otherwise continue aggressive IV fluid hydration, follow CK and urinalysis in the morning.  Sed rate amd CRP within normal limits, so less likely myositis.  Reviewed PTA meds and none with side effect of causing rhabdo.  -Continue aggressive IVF hydration, give 1 L bolus and increase to 200 ml/hr overnight  -Continue to trend CK tomorrow  -Recheck CMP tomorrow given elevated LFTs, continue to monitor  Cr status to ensure remains stable    2.  History of iron deficiency anemia: Patient states she is no longer taking iron.  Check her hemoglobin in the morning.     Diet: Regular Diet Adult    DVT Prophylaxis: Ambulate every shift  Malagon Catheter: not present  Code Status: FULL CODE    Disposition Plan   Expected discharge: 2 - 3 days, recommended to prior living arrangement once CK and LFTs trending down.  Entered: Gabriela Hussein PA-C 06/16/2019, 10:40 AM       The patient's care was discussed with the Patient.    Gabriela Hussein PA-C  Hospitalist Service  [facility]    ______________________________________________________________________    Interval History   Patient reports that pain in the left upper extremity is about the same.  Swelling seems about the same approximately, but is improved distally regarding her hands and fingers.  She is not having any numbness or tingling.  She has good  strength.  She reports that her elbow and upper arm feels stiff and sore.  No fevers, chills, chest pain, or shortness of breath.  No nausea, vomiting, diarrhea, or abdominal pain.  She reports that her urine has been yellow, no red or muddy/brown color.    Data reviewed today: I reviewed all medications, new labs and imaging results over the last 24 hours. I personally reviewed:    Results for orders placed or performed during the hospital encounter of 06/15/19   US Extremity Upper Venous  lt    Narrative    US UPPER EXTREMITY VENOUS DUPLEX LEFT    6/16/2019 7:11 AM     HISTORY: Left arm swelling.    TECHNIQUE: Spectral Doppler and waveform analysis were performed on  the left upper extremity veins.     COMPARISON: None.    FINDINGS: The left internal jugular, subclavian, axillary, and  brachial veins are patent and negative for deep venous thrombosis.   The left basilic, cephalic, radial, and ulnar veins also appear patent  and negative for thrombus.      Impression    IMPRESSION: No evidence for deep  venous thrombosis in the left upper  extremity.    ANAM PENG MD   Basic metabolic panel   Result Value Ref Range    Sodium 140 133 - 144 mmol/L    Potassium 4.0 3.4 - 5.3 mmol/L    Chloride 105 94 - 109 mmol/L    Carbon Dioxide 28 20 - 32 mmol/L    Anion Gap 7 3 - 14 mmol/L    Glucose 87 70 - 99 mg/dL    Urea Nitrogen 10 7 - 30 mg/dL    Creatinine 0.73 0.52 - 1.04 mg/dL    GFR Estimate >90 >60 mL/min/[1.73_m2]    GFR Estimate If Black >90 >60 mL/min/[1.73_m2]    Calcium 8.7 8.5 - 10.1 mg/dL   CK total   Result Value Ref Range    CK Total 17,692 (HH) 30 - 225 U/L   UA with Microscopic   Result Value Ref Range    Color Urine Yellow     Appearance Urine Clear     Glucose Urine Negative NEG^Negative mg/dL    Bilirubin Urine Negative NEG^Negative    Ketones Urine Negative NEG^Negative mg/dL    Specific Gravity Urine 1.027 1.003 - 1.035    Blood Urine Small (A) NEG^Negative    pH Urine 5.5 5.0 - 7.0 pH    Protein Albumin Urine Negative NEG^Negative mg/dL    Urobilinogen mg/dL 2.0 0.0 - 2.0 mg/dL    Nitrite Urine Negative NEG^Negative    Leukocyte Esterase Urine Negative NEG^Negative    Source Midstream Urine     WBC Urine 1 0 - 5 /HPF    RBC Urine 9 (H) 0 - 2 /HPF    Bacteria Urine Few (A) NEG^Negative /HPF    Squamous Epithelial /HPF Urine 1 0 - 1 /HPF    Mucous Urine Present (A) NEG^Negative /LPF   CK total   Result Value Ref Range    CK Total 13,520 (HH) 30 - 225 U/L   CRP inflammation   Result Value Ref Range    CRP Inflammation <2.9 0.0 - 8.0 mg/L   Erythrocyte sedimentation rate auto   Result Value Ref Range    Sed Rate 9 0 - 20 mm/h   CK total   Result Value Ref Range    CK Total 19,620 (HH) 30 - 225 U/L   UA with Microscopic   Result Value Ref Range    Color Urine Straw     Appearance Urine Clear     Glucose Urine Negative NEG^Negative mg/dL    Bilirubin Urine Negative NEG^Negative    Ketones Urine Negative NEG^Negative mg/dL    Specific Gravity Urine 1.010 1.003 - 1.035    Blood Urine Negative NEG^Negative     pH Urine 7.0 5.0 - 7.0 pH    Protein Albumin Urine Negative NEG^Negative mg/dL    Urobilinogen mg/dL Normal 0.0 - 2.0 mg/dL    Nitrite Urine Negative NEG^Negative    Leukocyte Esterase Urine Negative NEG^Negative    Source Catheterized Urine     WBC Urine 0 0 - 5 /HPF    RBC Urine 1 0 - 2 /HPF    Bacteria Urine Few (A) NEG^Negative /HPF   Comprehensive metabolic panel   Result Value Ref Range    Sodium 141 133 - 144 mmol/L    Potassium 3.9 3.4 - 5.3 mmol/L    Chloride 109 94 - 109 mmol/L    Carbon Dioxide 25 20 - 32 mmol/L    Anion Gap 7 3 - 14 mmol/L    Glucose 86 70 - 99 mg/dL    Urea Nitrogen 7 7 - 30 mg/dL    Creatinine 0.63 0.52 - 1.04 mg/dL    GFR Estimate >90 >60 mL/min/[1.73_m2]    GFR Estimate If Black >90 >60 mL/min/[1.73_m2]    Calcium 8.2 (L) 8.5 - 10.1 mg/dL    Bilirubin Total 0.7 0.2 - 1.3 mg/dL    Albumin 2.8 (L) 3.4 - 5.0 g/dL    Protein Total 6.4 (L) 6.8 - 8.8 g/dL    Alkaline Phosphatase 42 40 - 150 U/L    ALT 87 (H) 0 - 50 U/L     (H) 0 - 45 U/L   CK total   Result Value Ref Range    CK Total 19,673 (HH) 30 - 225 U/L       Physical Exam   Vital Signs: Temp: 97.7  F (36.5  C) Temp src: Oral BP: 110/74 Pulse: 75   Resp: 18 SpO2: 99 % O2 Device: None (Room air)    Weight: 167 lbs 12.32 oz  GENERAL:  Comfortable.  PSYCH: pleasant, oriented, No acute distress.  HEENT:  Atraumatic, normocephalic. PERRLA. Normal conjunctiva, normal hearing, and oropharynx is normal.  NECK:  Supple, no neck vein distention, adenopathy or bruits, normal thyroid.  HEART:  Normal S1, S2 with no murmur, no pericardial rub, gallops or S3 or S4.  LUNGS:  Clear to auscultation, normal respiratory effort. No wheezing, rales or ronchi.  GI:  Soft, no hepatosplenomegaly, normal bowel sounds. Non-tender, non distended.   EXTREMITIES:  No pedal edema, +2 pulses bilateral and equal. Left tricep with a sense of fullness but compartment is soft and no significant tenderness with palpation. Dec ROM around the left arm and left  elbow but no actual pain in the elbow joint.   SKIN:  Dry to touch, No rash, wound or ulcerations.  NEUROLOGIC:  CN 2-12 intact, BL 5/5 symmetric upper and lower extremity strength, sensation is intact with no focal deficits.     Data   Results for orders placed or performed during the hospital encounter of 06/15/19   US Extremity Upper Venous  lt    Narrative    US UPPER EXTREMITY VENOUS DUPLEX LEFT    6/16/2019 7:11 AM     HISTORY: Left arm swelling.    TECHNIQUE: Spectral Doppler and waveform analysis were performed on  the left upper extremity veins.     COMPARISON: None.    FINDINGS: The left internal jugular, subclavian, axillary, and  brachial veins are patent and negative for deep venous thrombosis.   The left basilic, cephalic, radial, and ulnar veins also appear patent  and negative for thrombus.      Impression    IMPRESSION: No evidence for deep venous thrombosis in the left upper  extremity.    ANAM PENG MD   Basic metabolic panel   Result Value Ref Range    Sodium 140 133 - 144 mmol/L    Potassium 4.0 3.4 - 5.3 mmol/L    Chloride 105 94 - 109 mmol/L    Carbon Dioxide 28 20 - 32 mmol/L    Anion Gap 7 3 - 14 mmol/L    Glucose 87 70 - 99 mg/dL    Urea Nitrogen 10 7 - 30 mg/dL    Creatinine 0.73 0.52 - 1.04 mg/dL    GFR Estimate >90 >60 mL/min/[1.73_m2]    GFR Estimate If Black >90 >60 mL/min/[1.73_m2]    Calcium 8.7 8.5 - 10.1 mg/dL   CK total   Result Value Ref Range    CK Total 17,692 (HH) 30 - 225 U/L   UA with Microscopic   Result Value Ref Range    Color Urine Yellow     Appearance Urine Clear     Glucose Urine Negative NEG^Negative mg/dL    Bilirubin Urine Negative NEG^Negative    Ketones Urine Negative NEG^Negative mg/dL    Specific Gravity Urine 1.027 1.003 - 1.035    Blood Urine Small (A) NEG^Negative    pH Urine 5.5 5.0 - 7.0 pH    Protein Albumin Urine Negative NEG^Negative mg/dL    Urobilinogen mg/dL 2.0 0.0 - 2.0 mg/dL    Nitrite Urine Negative NEG^Negative    Leukocyte Esterase Urine  Negative NEG^Negative    Source Midstream Urine     WBC Urine 1 0 - 5 /HPF    RBC Urine 9 (H) 0 - 2 /HPF    Bacteria Urine Few (A) NEG^Negative /HPF    Squamous Epithelial /HPF Urine 1 0 - 1 /HPF    Mucous Urine Present (A) NEG^Negative /LPF   CK total   Result Value Ref Range    CK Total 13,520 (HH) 30 - 225 U/L   CRP inflammation   Result Value Ref Range    CRP Inflammation <2.9 0.0 - 8.0 mg/L   Erythrocyte sedimentation rate auto   Result Value Ref Range    Sed Rate 9 0 - 20 mm/h   CK total   Result Value Ref Range    CK Total 19,620 (HH) 30 - 225 U/L   UA with Microscopic   Result Value Ref Range    Color Urine Straw     Appearance Urine Clear     Glucose Urine Negative NEG^Negative mg/dL    Bilirubin Urine Negative NEG^Negative    Ketones Urine Negative NEG^Negative mg/dL    Specific Gravity Urine 1.010 1.003 - 1.035    Blood Urine Negative NEG^Negative    pH Urine 7.0 5.0 - 7.0 pH    Protein Albumin Urine Negative NEG^Negative mg/dL    Urobilinogen mg/dL Normal 0.0 - 2.0 mg/dL    Nitrite Urine Negative NEG^Negative    Leukocyte Esterase Urine Negative NEG^Negative    Source Catheterized Urine     WBC Urine 0 0 - 5 /HPF    RBC Urine 1 0 - 2 /HPF    Bacteria Urine Few (A) NEG^Negative /HPF   Comprehensive metabolic panel   Result Value Ref Range    Sodium 141 133 - 144 mmol/L    Potassium 3.9 3.4 - 5.3 mmol/L    Chloride 109 94 - 109 mmol/L    Carbon Dioxide 25 20 - 32 mmol/L    Anion Gap 7 3 - 14 mmol/L    Glucose 86 70 - 99 mg/dL    Urea Nitrogen 7 7 - 30 mg/dL    Creatinine 0.63 0.52 - 1.04 mg/dL    GFR Estimate >90 >60 mL/min/[1.73_m2]    GFR Estimate If Black >90 >60 mL/min/[1.73_m2]    Calcium 8.2 (L) 8.5 - 10.1 mg/dL    Bilirubin Total 0.7 0.2 - 1.3 mg/dL    Albumin 2.8 (L) 3.4 - 5.0 g/dL    Protein Total 6.4 (L) 6.8 - 8.8 g/dL    Alkaline Phosphatase 42 40 - 150 U/L    ALT 87 (H) 0 - 50 U/L     (H) 0 - 45 U/L   CK total   Result Value Ref Range    CK Total 19,673 () 30 - 225 U/L

## 2019-06-16 NOTE — PLAN OF CARE
PRIMARY DIAGNOSIS: Rhabdomyolysis   OUTPATIENT/OBSERVATION GOALS TO BE MET BEFORE DISCHARGE:  ADLs back to baseline: Yes    Activity and level of assistance: Ambulating independently.    Pain status: Improved-controlled with oral pain medications.    Return to near baseline physical activity: Yes     Discharge Planner Nurse   Safe discharge environment identified: Yes  Barriers to discharge: Yes- CK elevated, pain management, unable to bend left elbow.       Entered by: Sp Guerrreo 06/15/2019 11:45 PM     Please review provider order for any additional goals.   Nurse to notify provider when observation goals have been met and patient is ready for discharge.

## 2019-06-17 VITALS
BODY MASS INDEX: 26.96 KG/M2 | RESPIRATION RATE: 16 BRPM | OXYGEN SATURATION: 99 % | HEIGHT: 66 IN | HEART RATE: 74 BPM | DIASTOLIC BLOOD PRESSURE: 59 MMHG | WEIGHT: 167.77 LBS | SYSTOLIC BLOOD PRESSURE: 116 MMHG | TEMPERATURE: 98.1 F

## 2019-06-17 LAB
ALBUMIN SERPL-MCNC: 2.7 G/DL (ref 3.4–5)
ALP SERPL-CCNC: 41 U/L (ref 40–150)
ALT SERPL W P-5'-P-CCNC: 86 U/L (ref 0–50)
ANION GAP SERPL CALCULATED.3IONS-SCNC: 5 MMOL/L (ref 3–14)
AST SERPL W P-5'-P-CCNC: 297 U/L (ref 0–45)
BILIRUB SERPL-MCNC: 0.3 MG/DL (ref 0.2–1.3)
BUN SERPL-MCNC: 8 MG/DL (ref 7–30)
CALCIUM SERPL-MCNC: 7.9 MG/DL (ref 8.5–10.1)
CHLORIDE SERPL-SCNC: 110 MMOL/L (ref 94–109)
CK SERPL-CCNC: ABNORMAL U/L (ref 30–225)
CO2 SERPL-SCNC: 26 MMOL/L (ref 20–32)
CREAT SERPL-MCNC: 0.61 MG/DL (ref 0.52–1.04)
GFR SERPL CREATININE-BSD FRML MDRD: >90 ML/MIN/{1.73_M2}
GLUCOSE SERPL-MCNC: 99 MG/DL (ref 70–99)
POTASSIUM SERPL-SCNC: 4 MMOL/L (ref 3.4–5.3)
PROT SERPL-MCNC: 5.9 G/DL (ref 6.8–8.8)
SODIUM SERPL-SCNC: 141 MMOL/L (ref 133–144)

## 2019-06-17 PROCEDURE — 82550 ASSAY OF CK (CPK): CPT | Performed by: PHYSICIAN ASSISTANT

## 2019-06-17 PROCEDURE — 25800030 ZZH RX IP 258 OP 636: Performed by: PHYSICIAN ASSISTANT

## 2019-06-17 PROCEDURE — 99239 HOSP IP/OBS DSCHRG MGMT >30: CPT | Performed by: HOSPITALIST

## 2019-06-17 PROCEDURE — 25000132 ZZH RX MED GY IP 250 OP 250 PS 637: Performed by: PHYSICIAN ASSISTANT

## 2019-06-17 PROCEDURE — 36415 COLL VENOUS BLD VENIPUNCTURE: CPT | Performed by: PHYSICIAN ASSISTANT

## 2019-06-17 PROCEDURE — 25800030 ZZH RX IP 258 OP 636: Performed by: HOSPITALIST

## 2019-06-17 PROCEDURE — 80053 COMPREHEN METABOLIC PANEL: CPT | Performed by: PHYSICIAN ASSISTANT

## 2019-06-17 RX ORDER — OXYCODONE HYDROCHLORIDE 5 MG/1
5 TABLET ORAL EVERY 4 HOURS PRN
Qty: 10 TABLET | Refills: 0 | Status: SHIPPED | OUTPATIENT
Start: 2019-06-17 | End: 2020-01-24

## 2019-06-17 RX ORDER — SODIUM CHLORIDE, SODIUM LACTATE, POTASSIUM CHLORIDE, CALCIUM CHLORIDE 600; 310; 30; 20 MG/100ML; MG/100ML; MG/100ML; MG/100ML
INJECTION, SOLUTION INTRAVENOUS CONTINUOUS
Status: DISCONTINUED | OUTPATIENT
Start: 2019-06-17 | End: 2019-06-17 | Stop reason: HOSPADM

## 2019-06-17 RX ADMIN — SODIUM CHLORIDE, POTASSIUM CHLORIDE, SODIUM LACTATE AND CALCIUM CHLORIDE: 600; 310; 30; 20 INJECTION, SOLUTION INTRAVENOUS at 09:00

## 2019-06-17 RX ADMIN — SODIUM CHLORIDE: 9 INJECTION, SOLUTION INTRAVENOUS at 01:56

## 2019-06-17 RX ADMIN — MENTHOL 1 PATCH: 205.5 PATCH TOPICAL at 09:07

## 2019-06-17 RX ADMIN — SENNOSIDES AND DOCUSATE SODIUM 1 TABLET: 8.6; 5 TABLET ORAL at 09:05

## 2019-06-17 RX ADMIN — SODIUM CHLORIDE: 9 INJECTION, SOLUTION INTRAVENOUS at 07:03

## 2019-06-17 ASSESSMENT — ACTIVITIES OF DAILY LIVING (ADL)
SWALLOWING: 0-->SWALLOWS FOODS/LIQUIDS WITHOUT DIFFICULTY
RETIRED_EATING: 0-->INDEPENDENT
DRESS: 0-->INDEPENDENT
BATHING: 0-->INDEPENDENT
TOILETING: 0-->INDEPENDENT
RETIRED_COMMUNICATION: 0-->UNDERSTANDS/COMMUNICATES WITHOUT DIFFICULTY

## 2019-06-17 NOTE — CONSULTS
Consult Date:  06/16/2019      ORTHOPEDIC CONSULTATION      CHIEF COMPLAINT:  Left arm pain and swelling with elevated CK, possible rhabdomyolysis and compartment syndrome.      HISTORY OF PRESENT ILLNESS:  Dr. Chacon has requested orthopedic consultation for primarily evaluation of compartment syndrome in a 28-year-old female.  Rakel is otherwise healthy, who was doing some significant cross training and iron band training over the last week.  Based on the month of Ramadan, she has been fasting and not drinking fluids.  She presented to the emergency room with upper extremity pain and also history of some lower extremity pain as well.  She was unable to fully bend her extend her arm and a CK was obtained which showed it to be  elevated and they were concerned about rhabdomyolysis.  They have been following her CK levels which were slightly elevated from the day before and they were concerned about her continued swelling and wanted to be evaluated for this compartment syndrome.  She denies any injuries or direct trauma to the arm.  She states that her pain is mainly with movement, but when she is resting, she has very little pain.  She denies any other orthopedic injuries today.      PAST MEDICAL HISTORY:  Iron deficiency anemia.      PREVIOUS SURGERIES:  None.      SOCIAL HISTORY:  She is single, and a nonsmoker.      FAMILY HISTORY:  Noncontributory.      ALLERGIES:  PENICILLIN.      MEDICATIONS:   1.  Adenosine.   2.  Aldactone.      REVIEW OF SYSTEMS:  The patient denies fevers, chills, chest pain, coughs, dysopsia, dysuria, denies any other musculoskeletal complaints.      PHYSICAL EXAMINATION:   GENERAL:  Rakel is alert and oriented x3, very pleasant, answers questions appropriately.  She is lying comfortably in bed.   EXTREMITIES:  Examination of her upper extremities do reveal all compartments are soft.  She does get mild pain near the extremes of both flexion and extension and feels that is quite sore in the  area of the triceps when she tries to fully flex her elbow.  She is able to fully extend her wrist; her  strength is 5/5 strength with both wrist and triceps and biceps strength is 5/5 in only minimally painful.  Skin overlying is tight.  There is no erythema, bruising or blisters noted.  Distal radial pulse is 2+.      LABORATORY:  CK noted from yesterday was 17,692.  Her CK today is 19,673.  Most recent creatinine is 1.01.      IMAGING:  Deferred.      IMPRESSION:  The left upper extremity pain and swelling secondary to myositis with elevated CK consistent with rhabdomyolysis, no evidence of ongoing compartment syndrome.      PLAN:  I discussed with Ms. Zavaleta the pathophysiology of myositis and rhabdomyolysis and I reassured her that I did not believe she has an ongoing compartment syndrome.  Most likely, we should start seeing a decrease in her CK over the next 1-2 days as most likely she has had some ongoing muscle damage which is currently being metabolized by the rest of the body.  I did recommend some gentle stretching, ice and elevation as well some gentle exercises for her to try as she heals this and the muscle begins to recover; again we discussed good hydration prior to any major workouts going forward.  She agrees with this plan.  If her symptoms worsen, the medical team will contact me for reevaluation; in the meantime, we will continue to follow CKs; she should be up as tolerates.         TALISHA JHAVERI MD             D: 2019   T: 2019   MT: JASBIR      Name:     AVNI ZAVALETA   MRN:      [id number]        Account:       [id number]   :      1990           Consult Date:  2019      Document: [id number]       [facility]

## 2019-06-17 NOTE — PROGRESS NOTES
Orthopedic Surgery  Rakel Shaw  2019  Admit Date:  6/15/2019  Acute rhabdomyolysis, left arm pain with swelling    Alert and orient to person, place, and time.  Patient resting comfortably in bed.    Reports her pain is improving.   Tolerating oral intake.    Denies nausea or vomiting  Denies chest pain or shortness of breath    Vital Sign Ranges  Temperature Temp  Av.3  F (36.3  C)  Min: 96  F (35.6  C)  Max: 98.1  F (36.7  C)   Blood pressure Systolic (24hrs), Av , Min:107 , Max:123        Diastolic (24hrs), Av, Min:58, Max:71      Pulse Pulse  Av.3  Min: 68  Max: 84   Respirations Resp  Av.8  Min: 16  Max: 18   Pulse oximetry SpO2  Av.8 %  Min: 99 %  Max: 100 %       Mild tenderness to palpation along posterior elbow, distal arm region.   Mild swelling.  No fluctuance.    Able to flex and extend fingers without difficulty  Full sensation to light touch  Radial pulse present  Capillary refill <2 seconds      Labs:  Recent Labs   Lab Test 19  0718 19  1002 06/15/19  1916   POTASSIUM 4.0 3.9 4.0     No results for input(s): HGB in the last 23026 hours.  No results for input(s): INR in the last 02156 hours.  No results for input(s): PLT in the last 62811 hours.    CK remains elevated:  26073, down from 48245 from yesterday.     1. PLAN:   WBAT LUE.     Continue current pain regiment.   Follow-up: Ortho 1-2 weeks if continued arm pain.     2. Disposition   Anticipate d/c to home when medically cleared.    Tenisha Etienne PA-C

## 2019-06-17 NOTE — PLAN OF CARE
Patient's After Visit Summary was reviewed with patient and/or father.   Patient verbalized understanding of After Visit Summary, recommended follow up and was given an opportunity to ask questions.   Discharge medications sent home with patient/family: No, sent with paper script for oxycodone.    Discharged with father.    Discharged in stable condition with father. Instructed to avoid ibuprofen and tylenol until follow up with PCP within 7 days for CK & LFT rechecks.

## 2019-06-17 NOTE — PROVIDER NOTIFICATION
Notified MD at 10:32 AM regarding critical results read back.      Spoke with: Dr. Martinez    Orders were not obtained.    Comments: Dr. Martinez [place] [address]. Patient with known rhabdomyolysis; CK improved from previous. LR running at 150mL/hr.

## 2019-06-17 NOTE — PLAN OF CARE
"[place]: A&Ox4  VS: /71 (BP Location: Right arm)   Pulse 68   Temp 96  F (35.6  C) (Oral)   Resp 18   Ht 1.676 m (5' 6\")   Wt 76.1 kg (167 lb 12.3 oz)   SpO2 100%   BMI 27.08 kg/m    LS: WNL  GI: Pt reports some constipation that is baseline for her  : WNL  Skin: WNL  Activity: Independent   Diet: Regular   Pain: 4/10 and improving since yesterday. Pt has had ibuprofen which is now discharged. Pt agreeable to try icy/hot patch on elbow over night. Ice placed earlier during the evening.  Lab: CK 19,889  Tele: N/A  Plan: Continue to monitor, IVF at 200 ml/hr, manage pain, pt pleasant, fiance planning on spending the night   "

## 2019-06-17 NOTE — DISCHARGE SUMMARY
[facility]  Hospitalist Discharge Summary       Date of Admission:  6/15/2019  Date of Discharge:  6/17/2019  Discharging Provider: Thong Martinez MD      Discharge Diagnoses   Rhabdomyolysis     Follow-ups Needed After Discharge   Follow-up Appointments     Follow-up and recommended labs and tests       Follow up with primary care provider, [facility], within 7   days for hospital follow- up.  The following labs/tests are recommended:   CK (creatinine kinase), LFTs (liver function tests).             Unresulted Labs Ordered in the Past 30 Days of this Admission     No orders found from 4/16/2019 to 6/16/2019.      These results will be followed up by NA    Discharge Disposition   Discharged to home  Condition at discharge: Stable    Hospital Course   Rakel Shaw is a 28 year old female with a PMH significant for iron deficiency anemia (not currently on supplements) and o/w healthy female (no hormones,not on any supplements, who presents with waxing and waning left posterior upper arm pain since Thursday with associated swelling. She has been consistently working on Cross Fit for about a year and has been progressively using heavier weights with particular focus on upper extremities this week; also has not been keeping up on PO hydration/eating.     She came to ED and labs revealed a significantly high CK suggestive of rhabdomyolysis.  Fortunately no renal involvement. She was admitted to observation status for aggressive IVF hydration and to trend her CK and Cr status.     Patient was admitted to the hospital. SHe was given IVF. Her renal function remained normal. Her LFTs were slightly elevated, consistent with rhabdo. Her CK has started to decrease. Her symptoms are much improved. She is not needing pain meds. She revealed she had been fasting for Ramadan and was not drinking water when she went to [employer]. I have told her she needs to keep hydrated. She will discharge home. Father  is in room.    Consultations This Hospital Stay   ORTHOPEDIC SURGERY IP CONSULT    Code Status   No Order    Time Spent on this Encounter   I, Thong Martinez, personally saw the patient today and spent greater than 30 minutes discharging this patient.       Thong Martinez MD  [facility]  ______________________________________________________________________    Physical Exam   Vital Signs: Temp: 98.1  F (36.7  C) Temp src: Oral BP: 116/59 Pulse: 74   Resp: 16 SpO2: 99 % O2 Device: None (Room air)    Weight: 167 lbs 12.32 oz  Constitutional: awake, alert, cooperative, no apparent distress, and appears stated age  Respiratory: No increased work of breathing, good air exchange, clear to auscultation bilaterally, no crackles or wheezing  Cardiovascular: Normal apical impulse, regular rate and rhythm, normal S1 and S2, no S3 or S4, and no murmur noted  GI: No scars, normal bowel sounds, soft, non-distended, non-tender, no masses palpated, no hepatosplenomegally  Left arm- no swelling. No tenderness  Primary Care Physician   [facility]    Discharge Orders      Reason for your hospital stay    Rhabdomyolysis     Follow-up and recommended labs and tests     Follow up with primary care provider, [facility], within 7 days for hospital follow- up.  The following labs/tests are recommended: CK (creatinine kinase), LFTs (liver function tests).     Activity    Your activity upon discharge: activity as tolerated.     Diet    Follow this diet upon discharge: Orders Placed This Encounter      Regular Diet Adult. You need to be sure you keep hydrated.       Significant Results and Procedures   Most Recent 3 CBC's:No lab results found.  Most Recent 3 BMP's:  Recent Labs   Lab Test 06/17/19  0718 06/16/19  1002 06/15/19  1916    141 140   POTASSIUM 4.0 3.9 4.0   CHLORIDE 110* 109 105   CO2 26 25 28   BUN 8 7 10   CR 0.61 0.63 0.73   ANIONGAP 5 7 7   AYLEEN 7.9* 8.2* 8.7   GLC 99 86 87      Most Recent 2 LFT's:  Recent Labs   Lab Test 06/17/19  0718 06/16/19  1002   * 360*   ALT 86* 87*   ALKPHOS 41 42   BILITOTAL 0.3 0.7     Most Recent CPK:  Recent Labs   Lab Test 06/17/19  0718 06/16/19  1002 06/16/19 0718   CKT 16,871* 19,673* 19,620*   ,   Results for orders placed or performed during the hospital encounter of 06/15/19   US Extremity Upper Venous  lt    Narrative    US UPPER EXTREMITY VENOUS DUPLEX LEFT    6/16/2019 7:11 AM     HISTORY: Left arm swelling.    TECHNIQUE: Spectral Doppler and waveform analysis were performed on  the left upper extremity veins.     COMPARISON: None.    FINDINGS: The left internal jugular, subclavian, axillary, and  brachial veins are patent and negative for deep venous thrombosis.   The left basilic, cephalic, radial, and ulnar veins also appear patent  and negative for thrombus.      Impression    IMPRESSION: No evidence for deep venous thrombosis in the left upper  extremity.    ANAM PENG MD       Discharge Medications   Current Discharge Medication List      START taking these medications    Details   oxyCODONE (ROXICODONE) 5 MG tablet Take 1 tablet (5 mg) by mouth every 4 hours as needed for moderate to severe pain  Qty: 10 tablet, Refills: 0    Associated Diagnoses: Traumatic rhabdomyolysis, initial encounter (H)         CONTINUE these medications which have NOT CHANGED    Details   minocycline (DYNACIN) 100 MG tablet Take 100 mg by mouth 2 times daily      spironolactone (ALDACTONE) 50 MG tablet Take 50 mg by mouth daily           Allergies   Allergies   Allergen Reactions     Penicillins

## 2019-06-17 NOTE — PLAN OF CARE
Temp: 98.1  F (36.7  C) Temp src: Oral BP: 116/59 Pulse: 74   Resp: 16 SpO2: 99 % O2 Device: None (Room air)       Neuro: WNL  Cardiac: WNL  Lungs: WNL  GI: WNL  : Patient reports slight constipation (baseline); had small BM yesterday. Senna given.   Pain: Pain only reported with movement. Declined oxycodone, icy hot patch placed.   IV: LR @ 150  Labs/tests: [place] improved to [address]. ALT 86. .  Diet: Regular; fluids encouraged.   Activity: Ind  Plan: Likely discharge this afternoon with father.

## 2019-06-17 NOTE — PLAN OF CARE
VS: max temp 99  Orientation: WDL  Tele: NA  Glucose checks: NA  Activity: independent  Diet: regular  GI: WDL  : WDL  Respiratory: WDL  IV: NS @ 200  Plan: Continue to treat with aggressive fluid hydration, watch CMS for compartment syndrome.

## 2020-01-24 ENCOUNTER — OFFICE VISIT (OUTPATIENT)
Dept: OBGYN | Facility: CLINIC | Age: 30
End: 2020-01-24
Payer: COMMERCIAL

## 2020-01-24 VITALS
WEIGHT: 162 LBS | HEIGHT: 66 IN | DIASTOLIC BLOOD PRESSURE: 60 MMHG | BODY MASS INDEX: 26.03 KG/M2 | SYSTOLIC BLOOD PRESSURE: 102 MMHG

## 2020-01-24 DIAGNOSIS — L70.0 ACNE VULGARIS: Primary | ICD-10-CM

## 2020-01-24 PROCEDURE — 99202 OFFICE O/P NEW SF 15 MIN: CPT | Performed by: OBSTETRICS & GYNECOLOGY

## 2020-01-24 RX ORDER — NORETHINDRONE ACETATE AND ETHINYL ESTRADIOL .03; 1.5 MG/1; MG/1
1 TABLET ORAL DAILY
Qty: 90 TABLET | Refills: 3 | Status: SHIPPED | OUTPATIENT
Start: 2020-01-24 | End: 2020-09-29

## 2020-01-24 RX ORDER — ISOTRETINOIN 40 MG/1
40 CAPSULE ORAL 2 TIMES DAILY
COMMUNITY
End: 2020-09-29

## 2020-01-24 ASSESSMENT — ANXIETY QUESTIONNAIRES
6. BECOMING EASILY ANNOYED OR IRRITABLE: NOT AT ALL
IF YOU CHECKED OFF ANY PROBLEMS ON THIS QUESTIONNAIRE, HOW DIFFICULT HAVE THESE PROBLEMS MADE IT FOR YOU TO DO YOUR WORK, TAKE CARE OF THINGS AT HOME, OR GET ALONG WITH OTHER PEOPLE: NOT DIFFICULT AT ALL
GAD7 TOTAL SCORE: 2
3. WORRYING TOO MUCH ABOUT DIFFERENT THINGS: NOT AT ALL
1. FEELING NERVOUS, ANXIOUS, OR ON EDGE: SEVERAL DAYS
7. FEELING AFRAID AS IF SOMETHING AWFUL MIGHT HAPPEN: NOT AT ALL
2. NOT BEING ABLE TO STOP OR CONTROL WORRYING: SEVERAL DAYS
5. BEING SO RESTLESS THAT IT IS HARD TO SIT STILL: NOT AT ALL

## 2020-01-24 ASSESSMENT — MIFFLIN-ST. JEOR: SCORE: 1481.58

## 2020-01-24 ASSESSMENT — PATIENT HEALTH QUESTIONNAIRE - PHQ9
5. POOR APPETITE OR OVEREATING: NOT AT ALL
SUM OF ALL RESPONSES TO PHQ QUESTIONS 1-9: 1

## 2020-01-24 NOTE — PROGRESS NOTES
SUBJECTIVE:                                                   Rakel Shaw is a 29 year old female who presents to clinic today for the following health issue(s):  Patient presents with:  Contraception: discuss birth control. Referred by dermatology due to being on Accutane.      HPI:  On Accutane since last August. Derm requires two forms of birth control. Getting  in March. Hasn't been sexually active yet.       Patient's last menstrual period was 2020..     Patient is not sexually active, .  Using not sexually active for contraception.    reports that she has never smoked. She has never used smokeless tobacco.  STD testing offered?  N/A, Never sexually active  Health maintenance updated:  Is due for pap    Today's PHQ-2 Score: No flowsheet data found.  Today's PHQ-9 Score:   PHQ-9 SCORE 2020   PHQ-9 Total Score 1     Today's HAO-7 Score:   HAO-7 SCORE 2020   Total Score 2       Problem list and histories reviewed & adjusted, as indicated.  Additional history: as documented.    Patient Active Problem List   Diagnosis     Rhabdomyolysis     Past Surgical History:   Procedure Laterality Date     NO HISTORY OF SURGERY        Social History     Tobacco Use     Smoking status: Never Smoker     Smokeless tobacco: Never Used   Substance Use Topics     Alcohol use: No      Problem (# of Occurrences) Relation (Name,Age of Onset)    Coronary Artery Disease (1) Father    Diabetes (2) Father, Sister    Hyperlipidemia (1) Father    Hypertension (1) Mother    Thyroid Disease (1) Sister            Current Outpatient Medications   Medication Sig     ISOtretinoin (ACCUTANE) 40 MG capsule Take 40 mg by mouth 2 times daily     No current facility-administered medications for this visit.      Allergies   Allergen Reactions     Seafood Rash and Other (See Comments)     Fish/ Shrimp     Penicillins        ROS:  12 point review of systems negative other than symptoms noted below or in the  "HPI.        OBJECTIVE:     /60   Ht 1.676 m (5' 6\")   Wt 73.5 kg (162 lb)   LMP 01/09/2020   BMI 26.15 kg/m    Body mass index is 26.15 kg/m .    Exam:  Constitutional:  Appearance: Well nourished, well developed alert, in no acute distress  Neurologic:  Mental Status:  Oriented X3.  Normal strength and tone, sensory exam grossly normal, mentation intact and speech normal.    Psychiatric:  Mentation appears normal and affect normal/bright.     In-Clinic Test Results:  No results found for this or any previous visit (from the past 24 hour(s)).    ASSESSMENT/PLAN:                                                        ICD-10-CM    1. Acne vulgaris L70.0        Discussed OCPs acne and risks on Accutane. Will start D#1 of menses. Getting  later part of March. Withdrawal bleed timing should be ok.   Once sexually active should RTC for first pelvic and pap smear.    20 minutes was spent face to face with the patient today discussing her history, diagnosis, and follow-up plan as noted above. Over 50% of the visit was spent in counseling and coordination of care.    Total Visit Time: 20 minutes.       Zack Naik MD  [facility] [place]  "

## 2020-01-25 ASSESSMENT — ANXIETY QUESTIONNAIRES: GAD7 TOTAL SCORE: 2

## 2020-02-24 ENCOUNTER — TELEPHONE (OUTPATIENT)
Dept: OBGYN | Facility: CLINIC | Age: 30
End: 2020-02-24

## 2020-02-24 NOTE — TELEPHONE ENCOUNTER
Attempted to reach the pt- No answer- No VM  Will try again later  June Wiley RN on 2/24/2020 at 1:30 PM

## 2020-02-25 NOTE — TELEPHONE ENCOUNTER
Started the pills on 2/12.  Is having some bleeding and is wondering if this will continue/ something to worry about?    Informed that it can take the body 3 or so months to adjust to the new medication.  Reminded to take at the same time of day, which she states she is.  Will continue to monitor and call back with further concerns after 3 months.  Pt verbalized understanding, in agreement with plan, and voiced no further questions.    Cynthia Lan RN on 2/25/2020 at 3:55 PM

## 2020-09-29 ENCOUNTER — OFFICE VISIT (OUTPATIENT)
Dept: OBGYN | Facility: CLINIC | Age: 30
End: 2020-09-29
Payer: COMMERCIAL

## 2020-09-29 VITALS
SYSTOLIC BLOOD PRESSURE: 112 MMHG | HEIGHT: 65 IN | BODY MASS INDEX: 27.82 KG/M2 | DIASTOLIC BLOOD PRESSURE: 72 MMHG | WEIGHT: 167 LBS

## 2020-09-29 DIAGNOSIS — Z13.1 SCREENING FOR DIABETES MELLITUS: ICD-10-CM

## 2020-09-29 DIAGNOSIS — Z01.419 ENCOUNTER FOR GYNECOLOGICAL EXAMINATION WITHOUT ABNORMAL FINDING: Primary | ICD-10-CM

## 2020-09-29 DIAGNOSIS — Z13.220 LIPID SCREENING: ICD-10-CM

## 2020-09-29 DIAGNOSIS — N63.25 BREAST LUMP ON LEFT SIDE AT 6 O'CLOCK POSITION: ICD-10-CM

## 2020-09-29 DIAGNOSIS — D50.8 IRON DEFICIENCY ANEMIA SECONDARY TO INADEQUATE DIETARY IRON INTAKE: ICD-10-CM

## 2020-09-29 DIAGNOSIS — Z12.4 SCREENING FOR CERVICAL CANCER: ICD-10-CM

## 2020-09-29 DIAGNOSIS — E55.9 VITAMIN D DEFICIENCY: ICD-10-CM

## 2020-09-29 LAB
ERYTHROCYTE [DISTWIDTH] IN BLOOD BY AUTOMATED COUNT: 12.9 % (ref 10–15)
HBA1C MFR BLD: 5.2 % (ref 0–5.6)
HCT VFR BLD AUTO: 40.3 % (ref 35–47)
HGB BLD-MCNC: 13.5 G/DL (ref 11.7–15.7)
MCH RBC QN AUTO: 27.3 PG (ref 26.5–33)
MCHC RBC AUTO-ENTMCNC: 33.5 G/DL (ref 31.5–36.5)
MCV RBC AUTO: 81 FL (ref 78–100)
PLATELET # BLD AUTO: 337 10E9/L (ref 150–450)
RBC # BLD AUTO: 4.95 10E12/L (ref 3.8–5.2)
WBC # BLD AUTO: 9.8 10E9/L (ref 4–11)

## 2020-09-29 PROCEDURE — 80061 LIPID PANEL: CPT | Performed by: OBSTETRICS & GYNECOLOGY

## 2020-09-29 PROCEDURE — 36415 COLL VENOUS BLD VENIPUNCTURE: CPT | Performed by: OBSTETRICS & GYNECOLOGY

## 2020-09-29 PROCEDURE — 99395 PREV VISIT EST AGE 18-39: CPT | Performed by: OBSTETRICS & GYNECOLOGY

## 2020-09-29 PROCEDURE — 82728 ASSAY OF FERRITIN: CPT | Performed by: OBSTETRICS & GYNECOLOGY

## 2020-09-29 PROCEDURE — G0145 SCR C/V CYTO,THINLAYER,RESCR: HCPCS | Performed by: OBSTETRICS & GYNECOLOGY

## 2020-09-29 PROCEDURE — 83036 HEMOGLOBIN GLYCOSYLATED A1C: CPT | Performed by: OBSTETRICS & GYNECOLOGY

## 2020-09-29 PROCEDURE — 85027 COMPLETE CBC AUTOMATED: CPT | Performed by: OBSTETRICS & GYNECOLOGY

## 2020-09-29 PROCEDURE — 82306 VITAMIN D 25 HYDROXY: CPT | Performed by: OBSTETRICS & GYNECOLOGY

## 2020-09-29 ASSESSMENT — PATIENT HEALTH QUESTIONNAIRE - PHQ9
SUM OF ALL RESPONSES TO PHQ QUESTIONS 1-9: 3
5. POOR APPETITE OR OVEREATING: NOT AT ALL

## 2020-09-29 ASSESSMENT — ANXIETY QUESTIONNAIRES
6. BECOMING EASILY ANNOYED OR IRRITABLE: SEVERAL DAYS
GAD7 TOTAL SCORE: 2
IF YOU CHECKED OFF ANY PROBLEMS ON THIS QUESTIONNAIRE, HOW DIFFICULT HAVE THESE PROBLEMS MADE IT FOR YOU TO DO YOUR WORK, TAKE CARE OF THINGS AT HOME, OR GET ALONG WITH OTHER PEOPLE: NOT DIFFICULT AT ALL
1. FEELING NERVOUS, ANXIOUS, OR ON EDGE: SEVERAL DAYS
3. WORRYING TOO MUCH ABOUT DIFFERENT THINGS: NOT AT ALL
7. FEELING AFRAID AS IF SOMETHING AWFUL MIGHT HAPPEN: NOT AT ALL
5. BEING SO RESTLESS THAT IT IS HARD TO SIT STILL: NOT AT ALL
2. NOT BEING ABLE TO STOP OR CONTROL WORRYING: NOT AT ALL

## 2020-09-29 ASSESSMENT — MIFFLIN-ST. JEOR: SCORE: 1483.39

## 2020-09-29 NOTE — PROGRESS NOTES
Rakel is a 30 year old  female who presents for annual exam.     Besides routine health maintenance, she would like to discuss anemia and low vitamin D. Patient has stopped her birth control and would like to try for pregnancy.    HPI:  Rakel presents for an annual exam. She recently got  and would like to get her pap smear. She has been dreading this visit. She completed accutane from 2019 to 2020. She is ready to conceive. She has a history of iron deficiency anemia and hypovitaminosis D. She admits to not taking any supplements on a consistent basis although she said when COVID first occurred she took her vitamins and supplements religiously. She is happily  and denies intimate partner violence.    The patient's PCP is [facility].      GYNECOLOGIC HISTORY:    Patient's last menstrual period was 2020.    Regular menses? yes  Menses every 26-27 days.  Length of menses: 5 days    Her current contraception method is: nothing.  She  reports that she has never smoked. She has never used smokeless tobacco.  Patient is sexually active.    Last PHQ-9 score on record =   PHQ-9 SCORE 2020   PHQ-9 Total Score 3     Last GAD7 score on record =   HAO-7 SCORE 2020   Total Score 2     Alcohol Score = 0    HEALTH MAINTENANCE:  Cholesterol: (No results found for: CHOL   Last Mammo: N/A, Result: not applicable, Next Mammo: screen age 40  Pap: due   Colonoscopy:  N/A, Result: not applicable, Next Colonoscopy: screen age 50  Dexa:  N/A  Health maintenance updated:  Pap due    HISTORY:  OB History    Para Term  AB Living   0 0 0 0 0 0   SAB TAB Ectopic Multiple Live Births   0 0 0 0 0       Patient Active Problem List   Diagnosis     Rhabdomyolysis     Breast lump on left side at 6 o'clock position     Past Surgical History:   Procedure Laterality Date     NO HISTORY OF SURGERY        Social History     Tobacco Use     Smoking status: Never Smoker      "Smokeless tobacco: Never Used   Substance Use Topics     Alcohol use: No      Problem (# of Occurrences) Relation (Name,Age of Onset)    Coronary Artery Disease (1) Father    Diabetes (2) Father, Sister    Hyperlipidemia (1) Father    Hypertension (1) Mother    Thyroid Disease (1) Sister            Current Outpatient Medications   Medication Sig     Prenatal Vit-Fe Fumarate-FA (PRENATAL VITAMIN PO)      No current facility-administered medications for this visit.      Allergies   Allergen Reactions     Seafood Rash and Other (See Comments)     Fish/ Shrimp     Penicillins        Past medical, surgical, social and family histories were reviewed and updated in EPIC.    ROS:   12 point review of systems negative other than symptoms noted below or in the HPI.  Constitutional: Fatigue  No urinary frequency or dysuria, bladder or kidney problems    EXAM:  /72   Ht 1.651 m (5' 5\")   Wt 75.8 kg (167 lb)   LMP 09/23/2020   BMI 27.79 kg/m     BMI: Body mass index is 27.79 kg/m .    PHYSICAL EXAM:  Constitutional:   Appearance: Well nourished, well developed, alert, in no acute distress  Neck:  Lymph Nodes:  No lymphadenopathy present    Thyroid:  Gland size normal, nontender, no nodules or masses present  on palpation  Chest:  Respiratory Effort:  Breathing unlabored  Cardiovascular:    Heart: Auscultation:  Regular rate, normal rhythm, no murmurs present  Breasts: Inspection of Breasts:  No lymphadenopathy present., Palpation of Breasts and Axillae:  Left breast lump inferior to the nipple at the 6 0'clock position, mobile, no breast tenderness., Axillary Lymph Nodes:  No lymphadenopathy present. and No nodularity, asymmetry or nipple discharge bilaterally.  Gastrointestinal:   Abdominal Examination:  Abdomen nontender to palpation, tone normal without rigidity or guarding, no masses present, umbilicus without lesions   Liver and Spleen:  No hepatomegaly present, liver nontender to palpation    Hernias:  No hernias " present  Lymphatic: Lymph Nodes:  No other lymphadenopathy present  Skin:  General Inspection:  No rashes present, no lesions present, no areas of  discoloration  Neurologic:    Mental Status:  Oriented X3.  Normal strength and tone, sensory exam                grossly normal, mentation intact and speech normal.    Psychiatric:   Mentation appears normal and affect normal/bright.         Pelvic Exam:  External Genitalia:     Normal appearance for age, no infibulation present, no discharge present, no tenderness present, no inflammatory lesions present, color normal  Vagina:     Normal vaginal vault without central or paravaginal defects, no discharge present, no inflammatory lesions present, no masses present  Bladder:     Nontender to palpation  Urethra:   Urethral Body:  Urethra palpation normal, urethra structural support normal   Urethral Meatus:  No erythema or lesions present  Cervix:     Appearance healthy, no lesions present, nontender to palpation, no bleeding present  Uterus:     Uterus: firm, normal sized and nontender, midplane in position.   Adnexa:     No adnexal tenderness present, no adnexal masses present  Perineum:     Perineum within normal limits, no evidence of trauma, no rashes or skin lesions present  Genitalia and Groin:     No infibulation present, no rashes present, no lesions present, no areas of discoloration, no masses present      COUNSELING:   Reviewed preventive health counseling, as reflected in patient instructions       Family planning    BMI: Body mass index is 27.79 kg/m .  Weight management plan: Discussed healthy diet and exercise guidelines    ASSESSMENT:  30 year old female with satisfactory annual exam.    ICD-10-CM    1. Encounter for gynecological examination without abnormal finding  Z01.419    2. Iron deficiency anemia secondary to inadequate dietary iron intake  D50.8 Ferritin     CBC with platelets   3. Lipid screening  Z13.220 Lipid Profile (Chol, Trig, HDL, LDL calc)    4. Screening for diabetes mellitus  Z13.1 Hemoglobin A1c   5. Vitamin D deficiency  E55.9 Vitamin D Deficiency   6. Screening for cervical cancer  Z12.4 Pap imaged thin layer screen reflex to HPV if ASCUS - recommended age 25 - 29 years   7. Breast lump on left side at 6 o'clock position  N63.20        PLAN:  Pap smear screening done  Will check a CBC, vitamin D and ferritin level due to her history  Encouraged to get the flu vaccine  Known left lump palpated today  Discussed timed intercourse and made recommendations on vitamin D, folic acid and ferrous sulfate.  RTC in one year or with new pregnancy      Shima Go MD

## 2020-09-30 ENCOUNTER — MYC MEDICAL ADVICE (OUTPATIENT)
Dept: OBGYN | Facility: CLINIC | Age: 30
End: 2020-09-30

## 2020-09-30 LAB
CHOLEST SERPL-MCNC: 221 MG/DL
DEPRECATED CALCIDIOL+CALCIFEROL SERPL-MC: 21 UG/L (ref 20–75)
FERRITIN SERPL-MCNC: 17 NG/ML (ref 12–150)
HDLC SERPL-MCNC: 63 MG/DL
LDLC SERPL CALC-MCNC: 139 MG/DL
NONHDLC SERPL-MCNC: 158 MG/DL
TRIGL SERPL-MCNC: 97 MG/DL

## 2020-09-30 ASSESSMENT — ANXIETY QUESTIONNAIRES: GAD7 TOTAL SCORE: 2

## 2020-10-01 NOTE — TELEPHONE ENCOUNTER
Vitamin D is 21  Pt responding to [employer] results and Vitamin D was pending at the time.    Routing pt [employer] message to Dr Go to advise.    Kanchan Garcia RN on 10/1/2020 at 8:57 AM

## 2020-10-01 NOTE — TELEPHONE ENCOUNTER
I recommend she take her vitamin D consistently. LDL is not typically affected by food in the short term that is more triglycerides but we can repeat it in one year fasting. Even at this level no medication is needed. If it continues with excess fatigue despite good sleep we may consider a sleep study

## 2020-10-05 LAB
COPATH REPORT: NORMAL
PAP: NORMAL

## 2020-11-29 ENCOUNTER — HEALTH MAINTENANCE LETTER (OUTPATIENT)
Age: 30
End: 2020-11-29

## 2021-01-18 ENCOUNTER — OFFICE VISIT (OUTPATIENT)
Dept: OBGYN | Facility: CLINIC | Age: 31
End: 2021-01-18
Payer: COMMERCIAL

## 2021-01-18 VITALS
DIASTOLIC BLOOD PRESSURE: 72 MMHG | SYSTOLIC BLOOD PRESSURE: 102 MMHG | BODY MASS INDEX: 27.03 KG/M2 | HEIGHT: 66 IN | WEIGHT: 168.2 LBS

## 2021-01-18 DIAGNOSIS — N97.9 FEMALE INFERTILITY: Primary | ICD-10-CM

## 2021-01-18 DIAGNOSIS — E61.1 IRON DEFICIENCY: ICD-10-CM

## 2021-01-18 DIAGNOSIS — E55.9 VITAMIN D DEFICIENCY: ICD-10-CM

## 2021-01-18 PROCEDURE — 99213 OFFICE O/P EST LOW 20 MIN: CPT | Performed by: OBSTETRICS & GYNECOLOGY

## 2021-01-18 PROCEDURE — 36415 COLL VENOUS BLD VENIPUNCTURE: CPT | Performed by: OBSTETRICS & GYNECOLOGY

## 2021-01-18 PROCEDURE — 82728 ASSAY OF FERRITIN: CPT | Performed by: OBSTETRICS & GYNECOLOGY

## 2021-01-18 PROCEDURE — 84443 ASSAY THYROID STIM HORMONE: CPT | Performed by: OBSTETRICS & GYNECOLOGY

## 2021-01-18 PROCEDURE — 83520 IMMUNOASSAY QUANT NOS NONAB: CPT | Mod: 90 | Performed by: OBSTETRICS & GYNECOLOGY

## 2021-01-18 PROCEDURE — 99000 SPECIMEN HANDLING OFFICE-LAB: CPT | Performed by: OBSTETRICS & GYNECOLOGY

## 2021-01-18 PROCEDURE — 84144 ASSAY OF PROGESTERONE: CPT | Performed by: OBSTETRICS & GYNECOLOGY

## 2021-01-18 PROCEDURE — 82306 VITAMIN D 25 HYDROXY: CPT | Performed by: OBSTETRICS & GYNECOLOGY

## 2021-01-18 PROCEDURE — 84702 CHORIONIC GONADOTROPIN TEST: CPT | Performed by: OBSTETRICS & GYNECOLOGY

## 2021-01-18 RX ORDER — FERROUS SULFATE 325(65) MG
325 TABLET ORAL
COMMUNITY
End: 2023-06-29

## 2021-01-18 RX ORDER — FOLIC ACID 0.8 MG
800 TABLET ORAL DAILY
COMMUNITY
End: 2022-10-12

## 2021-01-18 RX ORDER — ACETAMINOPHEN 160 MG
1 TABLET,DISINTEGRATING ORAL DAILY
COMMUNITY
End: 2023-07-26

## 2021-01-18 ASSESSMENT — MIFFLIN-ST. JEOR: SCORE: 1504.7

## 2021-01-18 NOTE — PROGRESS NOTES
SUBJECTIVE:                                                   Rakel Shaw is a 30 year old female who presents to clinic today for the following health issue(s):  Patient presents with:  Consult: Family planning      HPI:  aRkel presents as she has been trying to conceive with no success. She states that her peak usually occurred on day 14 but has been occurring on day 17. There is no concern with timed intercourse. She is currently on day 30 of her cycle and may have seen a faintly positive result on her UPT. She is wondering if she may have PCOS. Her cycles are 28-30 days and she denies any signs of androgen excess such as hormonal acne or excessive hair growth.    Patient's last menstrual period was 2020..     Patient is sexually active, .  Using none for contraception.    reports that she has never smoked. She has never used smokeless tobacco.    STD testing offered?  Declined    Health maintenance updated:  yes    Problem list and histories reviewed & adjusted, as indicated.  Additional history: as documented.    Patient Active Problem List   Diagnosis     Rhabdomyolysis     Breast lump on left side at 6 o'clock position     Past Surgical History:   Procedure Laterality Date     NO HISTORY OF SURGERY        Social History     Tobacco Use     Smoking status: Never Smoker     Smokeless tobacco: Never Used   Substance Use Topics     Alcohol use: No      Problem (# of Occurrences) Relation (Name,Age of Onset)    Coronary Artery Disease (1) Father    Diabetes (2) Father, Sister    Hyperlipidemia (1) Father    Hypertension (1) Mother    Thyroid Disease (1) Sister            Current Outpatient Medications   Medication Sig     CALCIUM ACETATE-MAGNESIUM CARB PO      Cholecalciferol (VITAMIN D3) 50 MCG (2000 UT) CAPS      ferrous sulfate (FEROSUL) 325 (65 Fe) MG tablet Take 325 mg by mouth daily (with breakfast)     folic acid 800 MCG tablet Take 800 mcg by mouth daily     Prenatal Vit-Fe Fumarate-FA  "(PRENATAL VITAMIN PO)      No current facility-administered medications for this visit.      Allergies   Allergen Reactions     Seafood Rash and Other (See Comments)     Fish/ Shrimp     Penicillins        ROS:  12 point review of systems negative other than symptoms noted below or in the HPI.  No urinary frequency or dysuria, bladder or kidney problems      OBJECTIVE:     /72   Ht 1.676 m (5' 6\")   Wt 76.3 kg (168 lb 3.2 oz)   LMP 12/20/2020   Breastfeeding No   BMI 27.15 kg/m    Body mass index is 27.15 kg/m .    Exam:  Constitutional:  Appearance: Well nourished, well developed alert, in no acute distress  Skin: General Inspection:  No rashes present, no lesions present, no areas of discoloration.  Neurologic:  Mental Status:  Oriented X3.  Normal strength and tone, sensory exam grossly normal, mentation intact and speech normal.    Psychiatric:  Mentation appears normal and affect normal/bright.  No Pelvic Exam performed     In-Clinic Test Results:  No results found for this or any previous visit (from the past 24 hour(s)).    ASSESSMENT/PLAN:                                                        ICD-10-CM    1. Female infertility  N97.9 TSH with free T4 reflex     Anti-Mullerian hormone     HCG Quantitative Pregnancy, Blood ([id number])     Progesterone   2. Vitamin D deficiency  E55.9 Vitamin D Deficiency   3. Iron deficiency  E61.1 Ferritin     Given that her menses is late I recommend we start with an HCG. We discussed the work up for PCOS. She does not meet any clinical criteria as she has regular menses with no signs of androgen excess. If she is not pregnant I recommend  Sonogram. Her OPK results were reviewed and she is truly having a surge in her LH levels consistently on a monthly basis.  She has a history of vitamin D and iron deficiency. As she is trying to conceive I recommend we recheck these today.     Shima Go MD  [facility] [place]  "

## 2021-01-19 LAB
B-HCG SERPL-ACNC: <1 IU/L (ref 0–5)
DEPRECATED CALCIDIOL+CALCIFEROL SERPL-MC: 54 UG/L (ref 20–75)
FERRITIN SERPL-MCNC: 20 NG/ML (ref 12–150)
PROGEST SERPL-MCNC: 10 NG/ML
TSH SERPL DL<=0.005 MIU/L-ACNC: 0.97 MU/L (ref 0.4–4)

## 2021-01-21 LAB — MIS SERPL-MCNC: 11.95 NG/ML (ref 0.4–16.02)

## 2021-01-25 ENCOUNTER — MYC MEDICAL ADVICE (OUTPATIENT)
Dept: OBGYN | Facility: CLINIC | Age: 31
End: 2021-01-25

## 2021-01-25 DIAGNOSIS — E28.2 PCOS (POLYCYSTIC OVARIAN SYNDROME): Primary | ICD-10-CM

## 2021-01-25 NOTE — TELEPHONE ENCOUNTER
Patient states she thought she had a positive pregnancy test before, saw a line however it was not pink. And her digital test was negative. She just now started having bright red bleeding. So believes this is her period.   Was told to call  if she got her period to discuss since test came back that may indicate PCOS.   Has never had period 4 days late so this whole thing through her off.    Routing to  to reach out to patient.    Liberty Constantino, RN

## 2021-01-26 RX ORDER — LETROZOLE 2.5 MG/1
2.5 TABLET, FILM COATED ORAL DAILY
Qty: 5 TABLET | Refills: 0 | Status: SHIPPED | OUTPATIENT
Start: 2021-01-26 | End: 2021-02-23

## 2021-01-26 NOTE — TELEPHONE ENCOUNTER
Rakel was called and we discussed PCOS further. At this time she would like to use letrozole. We discussed the increased risk of birth defects and need to use on menses. She will come in on day 21 for blood work  She was also counseled that she has not yet had an ultrasound or an HSG yet. Her  has also not had a semen analysis.     Shima Go MD

## 2021-01-28 ENCOUNTER — HOSPITAL ENCOUNTER (EMERGENCY)
Facility: CLINIC | Age: 31
Discharge: HOME | End: 2021-01-28
Attending: PHYSICIAN ASSISTANT | Admitting: PHYSICIAN ASSISTANT
Payer: COMMERCIAL

## 2021-01-28 VITALS
WEIGHT: 163 LBS | TEMPERATURE: 98.2 F | HEART RATE: 80 BPM | OXYGEN SATURATION: 99 % | DIASTOLIC BLOOD PRESSURE: 82 MMHG | SYSTOLIC BLOOD PRESSURE: 135 MMHG | BODY MASS INDEX: 25.58 KG/M2 | RESPIRATION RATE: 14 BRPM | HEIGHT: 67 IN

## 2021-01-28 DIAGNOSIS — R59.1 LYMPHADENOPATHY: ICD-10-CM

## 2021-01-28 PROCEDURE — 99282 EMERGENCY DEPT VISIT SF MDM: CPT

## 2021-01-28 ASSESSMENT — ENCOUNTER SYMPTOMS
FATIGUE: 1
HEADACHES: 1
MYALGIAS: 1
SHORTNESS OF BREATH: 0

## 2021-01-28 ASSESSMENT — MIFFLIN-ST. JEOR: SCORE: 1496.99

## 2021-01-29 NOTE — ED PROVIDER NOTES
"  History     Chief Complaint:  Swollen Lymph Nodes    HPI   Rakel Shaw is a 30 year old female with history of rhabdomyolysis who presents for evaluation of left-sided neck swelling that began this evening. The patient states she received her second COVID vaccine yesterday morning in her left arm. This morning she woke up with a mild headache and myalgias, which alleviated after taking Tylenol. She also endorsed feeling fatigued and warm. This evening she noticed her lymph nodes on the left side of her neck were swollen and painful, describing it as a pinching sensation. Out of concern for her symptoms, she called the nursing line who subsequently prompted her presentation.    Here, the patient states she has not noticed any swollen lymph nodes in her axilla. She denies any sore throat, chest pain, shortness of breath, or other symptoms prompting her presentation.     Review of Systems   Constitutional: Positive for fatigue (Alleviated).   Respiratory: Negative for shortness of breath.    Cardiovascular: Negative for chest pain.   Musculoskeletal: Positive for myalgias.   Neurological: Positive for headaches (Alleviated).   All other systems reviewed and are negative.    Allergies:  Seafood  Penicillins    Medications:  Ferosul  Letrozole    Past Medical History:    Rhabdomyolysis    Iron deficiency anemia      Family History:    Mother - Hypertension  Father - Hyperlipidemia, CAD, Diabetes  Sister - Thyroid disease, Diabetes     Social History:  The patient was unaccompanied to the ED.  PCP:  [facility], [place]   Works as a      Physical Exam     Patient Vitals for the past 24 hrs:   BP Temp Temp src Pulse Resp SpO2 Height Weight   01/28/21 2140 135/82 98.2  F (36.8  C) Oral 80 14 99 % 1.702 m (5' 7\") 73.9 kg (163 lb)       Physical Exam  General: Alert and interactive. Appears well.   Head: Atraumatic, without obvious lesion, abrasion, hematoma.   Eyes: The pupils are equal and round. No " scleral icterus.   ENT: No obvious abnormalities to the ears or nose. Mucous membranes moist.   Neck:Trachea is in the midline. No obvious swelling to the neck. Full range of motion.  Lymph: There are swollen and tender lymph nodes to the left posterior cervical lymph chain. No axillary lymph node swelling.    CV: Regular rate. Extremities well perfused.  Resp: Non-labored, no retractions or accessory muscle use.     GI: Abdomen is not distended.   MS: Moving all extremities well.   Neuro: Alert and oriented x 3. Non-focal examination.    Psych: Awake. Alert.  Normal affect. Appropriate interactions.    Emergency Department Course     Emergency Department Course:    Reviewed:  2150 I reviewed the patient's nursing notes, vitals, past medical records, Care Everywhere.     Assessments:  2153 I performed an exam of the patient, as documented above.     Disposition:  The patient was discharged to home.       Impression & Plan     Covid-19  Rakel Shaw was evaluated during a global COVID-19 pandemic, which necessitated consideration that the patient might be at risk for infection with the SARS-CoV-2 virus that causes COVID-19.   Applicable protocols for evaluation were followed during the patient's care.   COVID-19 was considered as part of the patient's evaluation.     Medical Decision Making:  Rakel Shaw is a 30 year old female for evaluation of lymphadenopathy after her second dose of the COVID vaccine. On examination, she has tender lymphadenopathy to the posterior cervical chain on the left side (same side as vaccine). I have explained that this is a very normal immune response to the COVID vaccine. She will continue using Tylenol/Ibuprofen and using heat to the lymph nodes. There is no other sign of infection at this time, as patient has no sore throat to suggest streptococcal pharyngitis, no signs of otitis media, no obvious skin signs to suggest cellulitis or folliculitis. She is stable for discharge at this  time and will observe her lymph nodes over time. If they are not improving by this time next week she is to follow-up closely with primary care.    Diagnosis:    ICD-10-CM    1. Lymphadenopathy  R59.1      Scribe Disclosure:  I, Boone Clement, am serving as a scribe at 9:51 PM on 1/28/2021 to document services personally performed by Isela Neil PA-C based on my observations and the provider's statements to me.  January 28, 2021     [facility] EMERGENCY DEPT       Isela Neil PA-C  01/28/21 2225

## 2021-01-29 NOTE — ED TRIAGE NOTES
2nd COVID vaccine yesterday, today had minor headache and body aches this morning reliefed after Tylenol.  Now has swollen and tender nodes on left neck and shoulder.

## 2021-02-05 NOTE — PROGRESS NOTES
SUBJECTIVE:                                                   Rakel Shaw is a 30 year old female who presents to clinic today for the following health issue(s):  Patient presents with:  Follow Up: US results      Additional information: patient stated trying to rule out PCOS    HPI:  Rakel presents on day 15 of her cycle. She took Femara on days 3-7 of her cycle. She had a positive peak ovulation on day 13 using the clear blue. She timed intercourse on day 13 but not on day 14 as she was not feeling well. Her sonogram today shows possible post-ovulation. This was her first round of letrozole. She is planning to have timed intercourse at home for now.     Patient's last menstrual period was 2021 (exact date)..     Patient is sexually active, .  Using none for contraception.    reports that she has never smoked. She has never used smokeless tobacco.    STD testing offered?  Declined    Health maintenance updated:  no    Today's PHQ-2 Score: No flowsheet data found.  Today's PHQ-9 Score:   PHQ-9 SCORE 2020   PHQ-9 Total Score 3     Today's HAO-7 Score:   HAO-7 SCORE 2020   Total Score 2       Problem list and histories reviewed & adjusted, as indicated.  Additional history: as documented.    Patient Active Problem List   Diagnosis     Rhabdomyolysis     Breast lump on left side at 6 o'clock position     PCOS (polycystic ovarian syndrome)     Past Surgical History:   Procedure Laterality Date     NO HISTORY OF SURGERY        Social History     Tobacco Use     Smoking status: Never Smoker     Smokeless tobacco: Never Used   Substance Use Topics     Alcohol use: No      Problem (# of Occurrences) Relation (Name,Age of Onset)    Coronary Artery Disease (1) Father    Diabetes (2) Father, Sister    Hyperlipidemia (1) Father    Hypertension (1) Mother    Thyroid Disease (1) Sister            Current Outpatient Medications   Medication Sig     CALCIUM ACETATE-MAGNESIUM CARB PO      Cholecalciferol  "(VITAMIN D3) 50 MCG (2000 UT) CAPS      ferrous sulfate (FEROSUL) 325 (65 Fe) MG tablet Take 325 mg by mouth daily (with breakfast)     folic acid 800 MCG tablet Take 800 mcg by mouth daily     Prenatal Vit-Fe Fumarate-FA (PRENATAL VITAMIN PO)      letrozole (FEMARA) 2.5 MG tablet Take 1 tablet (2.5 mg) by mouth daily for 5 days Start on day 3 of cycle     No current facility-administered medications for this visit.      Allergies   Allergen Reactions     Seafood Rash and Other (See Comments)     Fish/ Shrimp     Penicillins        ROS:  12 point review of systems negative other than symptoms noted below or in the HPI.  Rakel Shaw  is here for follicle study on Day # 15 of cycle.     This is cycle 1 of Femara.    She has taken Femara 2.5 mg  Day 3-7 of this cycle.    Follicle study shows  Post ovulation with a collapsing mature follicle in the right ovary with increased in pelvic free fluid. She has a trilaminar endometrium and polycystic morphology in both ovaries.          OBJECTIVE:     /68   Ht 1.651 m (5' 5\")   Wt 77.7 kg (171 lb 6.4 oz)   LMP 01/25/2021 (Exact Date)   Breastfeeding No   BMI 28.52 kg/m    Body mass index is 28.52 kg/m .    Exam:  Constitutional:  Appearance: Well nourished, well developed alert, in no acute distress  Skin: General Inspection:  No rashes present, no lesions present, no areas of discoloration.  Neurologic:  Mental Status:  Oriented X3.  Normal strength and tone, sensory exam grossly normal, mentation intact and speech normal.    Psychiatric:  Mentation appears normal and affect normal/bright.     In-Clinic Test Results:  Results for orders placed or performed in visit on 02/08/21 (from the past 24 hour(s))   US Transvaginal Non OB    Narrative    US Transvaginal Non OB  Order #: [id number] Accession #: [id number]  Study Notes     Vashti Coreas on 2/8/2021  3:29 PM      Gynecological Ultrasound Report  Pelvic U/S - Transvaginal  [facility]  Referring Provider: Dr. Shima Go  Sonographer:  Vashti Coreas RDMS  Indication: Polycystic Ovarian Syndrome (PCOS)  LMP: Patient's last menstrual period was 01/25/2021.  History: Cycle day 15. Letrozole day 3-7 of cycle  Gynecological Ultrasonography:   Uterus: anteverted. Contour is smooth/regular.  Size: 7.02 x 4.20 x 3.29 cm  Endometrium: Thickness Total 12.89 mm  Findings: Trilaminar  Right Ovary: 3.37 x 3.08 x 2.36 cm. Follicle/cyst 2.4x 2.0x .9cm  Left Ovary: 2.88 x 2.34 x 1.53 cm. Wnl  Cul de Sac Free Fluid: FF at fundus.     Impression: Anteverted uterus with a trilaminar endometrium. Bilateral   ovaries with polycystic morphology. Post ovulatory state based on   collapsed follicle and free fluid.  Shima Go MD                      ASSESSMENT/PLAN:                                                        ICD-10-CM    1. PCOS (polycystic ovarian syndrome)  E28.2      The initial plan was for femara, timed intercourse and a progesterone level on day 21 to check for ovulation. The sonogram today shows ovulation therefore the progesterone level is no longer needed. I discussed that it is ok to continue with timed intercourse at home for at least 3 total cycles before pursuing an HSG or a semen analysis. Rakel was encouraged to continue to use her digital ovulation predictor kit as it was accurate in detecting her LH surge.     If she gets a period in 2 weeks, we will refill her Femara at 2.5mg    Shima Go MD  [facility] [place]

## 2021-02-08 ENCOUNTER — ANCILLARY PROCEDURE (OUTPATIENT)
Dept: ULTRASOUND IMAGING | Facility: CLINIC | Age: 31
End: 2021-02-08
Payer: COMMERCIAL

## 2021-02-08 ENCOUNTER — OFFICE VISIT (OUTPATIENT)
Dept: OBGYN | Facility: CLINIC | Age: 31
End: 2021-02-08
Payer: COMMERCIAL

## 2021-02-08 VITALS
WEIGHT: 171.4 LBS | HEIGHT: 65 IN | BODY MASS INDEX: 28.56 KG/M2 | SYSTOLIC BLOOD PRESSURE: 120 MMHG | DIASTOLIC BLOOD PRESSURE: 68 MMHG

## 2021-02-08 DIAGNOSIS — E28.2 PCOS (POLYCYSTIC OVARIAN SYNDROME): ICD-10-CM

## 2021-02-08 DIAGNOSIS — E28.2 PCOS (POLYCYSTIC OVARIAN SYNDROME): Primary | ICD-10-CM

## 2021-02-08 PROCEDURE — 99213 OFFICE O/P EST LOW 20 MIN: CPT | Performed by: OBSTETRICS & GYNECOLOGY

## 2021-02-08 PROCEDURE — 76830 TRANSVAGINAL US NON-OB: CPT | Performed by: OBSTETRICS & GYNECOLOGY

## 2021-02-08 ASSESSMENT — MIFFLIN-ST. JEOR: SCORE: 1503.35

## 2021-02-22 ENCOUNTER — MYC MEDICAL ADVICE (OUTPATIENT)
Dept: OBGYN | Facility: CLINIC | Age: 31
End: 2021-02-22

## 2021-02-22 DIAGNOSIS — E28.2 PCOS (POLYCYSTIC OVARIAN SYNDROME): ICD-10-CM

## 2021-02-22 RX ORDER — LETROZOLE 2.5 MG/1
2.5 TABLET, FILM COATED ORAL DAILY
Qty: 5 TABLET | Refills: 0 | OUTPATIENT
Start: 2021-02-22

## 2021-02-22 NOTE — TELEPHONE ENCOUNTER
OV 2/8/21:  I discussed that it is ok to continue with timed intercourse at home for at least 3 total cycles before pursuing an HSG or a semen analysis. Rakel was encouraged to continue to use her digital ovulation predictor kit as it was accurate in detecting her LH surge.      If she gets a period in 2 weeks, we will refill her Femara at 2.5mg    Mychart msg sent to pt to follow up on status.  Awaiting reply.    Tabatha Madrigal RN on 2/22/2021 at 11:21 AM

## 2021-02-22 NOTE — TELEPHONE ENCOUNTER
Requested Prescriptions   Pending Prescriptions Disp Refills     letrozole (FEMARA) 2.5 MG tablet 5 tablet 0     Sig: Take 1 tablet (2.5 mg) by mouth daily Start on day 3 of cycle       There is no refill protocol information for this order        Last Written Prescription Date:  01/26/21  Last Fill Quantity: 5,  # refills: 0   Last office visit: 2/8/2021 with prescribing provider:  Endadeghe   Future Office Visit: none found

## 2021-03-01 DIAGNOSIS — E28.2 PCOS (POLYCYSTIC OVARIAN SYNDROME): ICD-10-CM

## 2021-03-01 RX ORDER — LETROZOLE 2.5 MG/1
2.5 TABLET, FILM COATED ORAL DAILY
Qty: 5 TABLET | Refills: 0 | OUTPATIENT
Start: 2021-03-01

## 2021-03-01 NOTE — TELEPHONE ENCOUNTER
Requested Prescriptions   Pending Prescriptions Disp Refills     letrozole (FEMARA) 2.5 MG tablet 5 tablet 0     Sig: Take 1 tablet (2.5 mg) by mouth daily Start on day 3 of cycle; cycle day 3-7       There is no refill protocol information for this order        Last Written Prescription Date:  02/23/21  Last Fill Quantity: 5,  # refills: 0   Last office visit: 2/8/2021 with prescribing provider:  Donavan   Future Office Visit:  None found    RX sent on 2/23 for current cycle.  Denied as duplicate request.  Tabatha Madrigal RN on 3/1/2021 at 11:31 AM

## 2021-03-22 ENCOUNTER — MYC MEDICAL ADVICE (OUTPATIENT)
Dept: OBGYN | Facility: CLINIC | Age: 31
End: 2021-03-22

## 2021-03-22 DIAGNOSIS — Z32.01 POSITIVE URINE PREGNANCY TEST: Primary | ICD-10-CM

## 2021-03-23 DIAGNOSIS — Z32.01 POSITIVE URINE PREGNANCY TEST: ICD-10-CM

## 2021-03-23 PROCEDURE — 36415 COLL VENOUS BLD VENIPUNCTURE: CPT | Performed by: OBSTETRICS & GYNECOLOGY

## 2021-03-23 PROCEDURE — 84702 CHORIONIC GONADOTROPIN TEST: CPT | Performed by: OBSTETRICS & GYNECOLOGY

## 2021-03-24 LAB — B-HCG SERPL-ACNC: <1 IU/L (ref 0–5)

## 2021-03-26 NOTE — TELEPHONE ENCOUNTER
Duplicate message sent in next encounter.    Encounter closed.    Tabatha Madrigal RN on 3/26/2021 at 4:54 PM

## 2021-04-13 DIAGNOSIS — N97.9 FEMALE INFERTILITY: Primary | ICD-10-CM

## 2021-04-14 DIAGNOSIS — N97.9 FEMALE INFERTILITY: ICD-10-CM

## 2021-04-14 LAB — PROGEST SERPL-MCNC: 16.8 NG/ML

## 2021-04-14 PROCEDURE — 84144 ASSAY OF PROGESTERONE: CPT | Performed by: OBSTETRICS & GYNECOLOGY

## 2021-04-14 PROCEDURE — 36415 COLL VENOUS BLD VENIPUNCTURE: CPT | Performed by: OBSTETRICS & GYNECOLOGY

## 2021-04-20 DIAGNOSIS — E28.2 PCOS (POLYCYSTIC OVARIAN SYNDROME): ICD-10-CM

## 2021-04-20 RX ORDER — LETROZOLE 2.5 MG/1
2.5 TABLET, FILM COATED ORAL DAILY
Qty: 5 TABLET | Refills: 0 | OUTPATIENT
Start: 2021-04-20

## 2021-04-20 NOTE — TELEPHONE ENCOUNTER
Requested Prescriptions   Pending Prescriptions Disp Refills     letrozole (FEMARA) 2.5 MG tablet 5 tablet 0     Sig: Take 1 tablet (2.5 mg) by mouth daily Start on day 3 of cycle; cycle day 3-7       There is no refill protocol information for this order        Last Written Prescription Date:  3/26/21  Last Fill Quantity: 5,  # refills: 0   Last office visit: 2/8/2021 with prescribing provider:  Donavan   Future Office Visit:   Next 5 appointments (look out 90 days)    Apr 26, 2021  3:00 PM  Office Visit with Shreya Guerrero DO  [facility] ([facility] ) [id number] [address] [id number] [phone]

## 2021-04-26 ENCOUNTER — OFFICE VISIT (OUTPATIENT)
Dept: OBGYN | Facility: CLINIC | Age: 31
End: 2021-04-26
Payer: COMMERCIAL

## 2021-04-26 VITALS
BODY MASS INDEX: 29.89 KG/M2 | SYSTOLIC BLOOD PRESSURE: 120 MMHG | DIASTOLIC BLOOD PRESSURE: 70 MMHG | HEIGHT: 65 IN | WEIGHT: 179.4 LBS

## 2021-04-26 DIAGNOSIS — E28.2 PCOS (POLYCYSTIC OVARIAN SYNDROME): Primary | ICD-10-CM

## 2021-04-26 PROCEDURE — 99000 SPECIMEN HANDLING OFFICE-LAB: CPT | Performed by: FAMILY MEDICINE

## 2021-04-26 PROCEDURE — 84403 ASSAY OF TOTAL TESTOSTERONE: CPT | Mod: 90 | Performed by: FAMILY MEDICINE

## 2021-04-26 PROCEDURE — 82157 ASSAY OF ANDROSTENEDIONE: CPT | Mod: 90 | Performed by: FAMILY MEDICINE

## 2021-04-26 PROCEDURE — 84146 ASSAY OF PROLACTIN: CPT | Performed by: FAMILY MEDICINE

## 2021-04-26 PROCEDURE — 82627 DEHYDROEPIANDROSTERONE: CPT | Performed by: FAMILY MEDICINE

## 2021-04-26 PROCEDURE — 84270 ASSAY OF SEX HORMONE GLOBUL: CPT | Performed by: FAMILY MEDICINE

## 2021-04-26 PROCEDURE — 99215 OFFICE O/P EST HI 40 MIN: CPT | Performed by: FAMILY MEDICINE

## 2021-04-26 PROCEDURE — 36415 COLL VENOUS BLD VENIPUNCTURE: CPT | Performed by: FAMILY MEDICINE

## 2021-04-26 ASSESSMENT — MIFFLIN-ST. JEOR: SCORE: 1539.63

## 2021-04-26 NOTE — PATIENT INSTRUCTIONS
Metformin start with one daily then increase to twice daily     Call next month for femara 5 mg if no pregnancy this month      to set up telephone encounter.     Dr. Shreya Guerrero, DO    Obstetrics and Gynecology  [facility] [place] [facility] [place]

## 2021-04-26 NOTE — NURSING NOTE
"Chief Complaint   Patient presents with     Consult     discuss fertility--had US in 2021 and didn't get any results       Initial /70   Ht 1.651 m (5' 5\")   Wt 81.4 kg (179 lb 6.4 oz)   LMP 2021 (Exact Date)   BMI 29.85 kg/m   Estimated body mass index is 29.85 kg/m  as calculated from the following:    Height as of this encounter: 1.651 m (5' 5\").    Weight as of this encounter: 81.4 kg (179 lb 6.4 oz).  BP completed using cuff size: regular    Questioned patient about current smoking habits.  Pt. has never smoked.          The following HM Due: NONE    "

## 2021-04-26 NOTE — PROGRESS NOTES
SUBJECTIVE:  Rakel Shaw is an 30 year old   woman who presents for   gynecology consult for PCOS, on femara currently for last 3 cycles.  Patient's last menstrual period was 2021 (exact date).   Has been diagnosed with PCOS on us and with AMH 11.  Has been treated with letrozole x 3 cycles.    Has been trying 5-6 months with tracking with ovulation.      Periods are regular q 28-30 days, lasting   5 days. Menarche @ age teen, Dysmenorrhea:mild, occurring premenstrually and first 1-2 days of flow. Cyclic symptoms   include none. No intermenstrual bleeding,   spotting, or discharge.    Current contraception: none  History of abnormal Pap smear: No  Family history of uterine or ovarian cancer: No  History of abnormal mammogram: No  Family history of breast cancer: No    Concerns today:         Past Medical History:   Diagnosis Date     NO ACTIVE PROBLEMS           Family History   Problem Relation Age of Onset     Hypertension Mother      Hyperlipidemia Father      Coronary Artery Disease Father      Diabetes Father      Thyroid Disease Sister      Diabetes Sister        Past Surgical History:   Procedure Laterality Date     NO HISTORY OF SURGERY         Current Outpatient Medications   Medication     CALCIUM ACETATE-MAGNESIUM CARB PO     Cholecalciferol (VITAMIN D3) 50 MCG (2000 UT) CAPS     ferrous sulfate (FEROSUL) 325 (65 Fe) MG tablet     folic acid 800 MCG tablet     letrozole (FEMARA) 2.5 MG tablet     Prenatal Vit-Fe Fumarate-FA (PRENATAL VITAMIN PO)     No current facility-administered medications for this visit.      Allergies   Allergen Reactions     Seafood Rash and Other (See Comments)     Fish/ Shrimp     Penicillins        Social History     Tobacco Use     Smoking status: Never Smoker     Smokeless tobacco: Never Used   Substance Use Topics     Alcohol use: No       Review Of Systems  Ears/Nose/Throat: negative  Respiratory: No shortness of breath, dyspnea on exertion, cough, or  "hemoptysis  Cardiovascular: negative  Gastrointestinal: negative  Genitourinary: negative  Constitutional, HEENT, cardiovascular, pulmonary, GI, , musculoskeletal, neuro, skin, endocrine and psych systems are negative, except as otherwise noted.    OBJECTIVE:  /70   Ht 1.651 m (5' 5\")   Wt 81.4 kg (179 lb 6.4 oz)   LMP 2021 (Exact Date)   BMI 29.85 kg/m    General appearance: healthy, alert and no distress  Skin: Skin color, texture, turgor normal. No rashes or lesions.  Ears: negative  Nose/Sinuses: Nares normal. Septum midline. Mucosa normal. No drainage or sinus tenderness.  Oropharynx: Lips, mucosa, and tongue normal. Teeth and gums normal.  Neck: Neck supple. No adenopathy. Thyroid symmetric, normal size,, Carotids without bruits.  Lungs: negative, Percussion normal. Good diaphragmatic excursion. Lungs clear  Heart: negative, PMI normal. No lifts, heaves, or thrills. RRR. No murmurs, clicks gallops or rub  Breasts: deferred  Abdomen: Abdomen soft, non-tender. BS normal. No masses, organomegaly        ASSESSMENT:  Rakel Shaw is an 30 year old   woman who presents for   gynecology consult for PCOS, on femara currently for last 3 cycles.  Patient's last menstrual period was 2021 (exact date).   Has been diagnosed with PCOS on us and with AMH 11.  Has been treated with letrozole x 3 cycles.    Has been trying 5-6 months with tracking with ovulation.      PLAN:  Dx:  1)  PCOS, diagnosed with elevated AMH and US, history of acne 1 year prior:  On femara. X 3 cycles.   Metformin discussed may help with weight loss, this is called in    Has gained weight over the last 6 months, about 10 pounds.  Weight gained in 2019, 2020  Has tried keto, focusing on vegetables.    Discussed risk of miscarriage with PCOS, discussed progesterone which is option, but not required.    Discussed reducing/stopping processed foods.    Currently on femara day 3 of medicine.    Can increase to 5 mg next month " if no pregnancy.     2)  Fertility work-up: discussed HSG, egg quality (already done, good reserve), and sperm testing.    Code for HSG given.    to call for telephone encounter to order sperm test or see his primary   Egg quality/quantity is elevated, good ovarian reserve     3) Overweight:  Will start metformin, has done prior diets with success.      Labs were reviewed in [facility]   Imaging was reviewed in Epic   Tests and documents were reviewed.   Discussion of management or test interpretation     49 minutes spent on the date of the encounter doing chart review, patient visit and documentation          Dr. Shreya Guerrero, DO    Obstetrics and Gynecology  [facility] [place] [facility] [place]

## 2021-04-27 LAB — PROLACTIN SERPL-MCNC: 16 UG/L (ref 3–27)

## 2021-04-28 DIAGNOSIS — E28.2 PCOS (POLYCYSTIC OVARIAN SYNDROME): ICD-10-CM

## 2021-04-28 LAB — DHEA-S SERPL-MCNC: 121 UG/DL (ref 35–430)

## 2021-04-28 RX ORDER — LETROZOLE 2.5 MG/1
2.5 TABLET, FILM COATED ORAL DAILY
Qty: 5 TABLET | Refills: 0 | OUTPATIENT
Start: 2021-04-28

## 2021-04-28 NOTE — TELEPHONE ENCOUNTER
Requested Prescriptions   Pending Prescriptions Disp Refills     letrozole (FEMARA) 2.5 MG tablet 5 tablet 0     Sig: Take 1 tablet (2.5 mg) by mouth daily Start on day 3 of cycle; cycle day 3-7       There is no refill protocol information for this order        Last Written Prescription Date:  4/23/21  Last Fill Quantity: 5,  # refills: 0   Last office visit: 2/8/2021 with prescribing provider:  Donavan   Future Office Visit:  none    Duplicate rx,   Rx denied.    Tabatha Madrigal RN on 4/28/2021 at 11:24 AM

## 2021-04-29 LAB
SHBG SERPL-SCNC: 65 NMOL/L (ref 30–135)
TESTOST FREE SERPL-MCNC: 0.7 NG/DL (ref 0.08–0.74)
TESTOST SERPL-MCNC: 62 NG/DL (ref 8–60)

## 2021-04-30 DIAGNOSIS — E28.2 PCOS (POLYCYSTIC OVARIAN SYNDROME): ICD-10-CM

## 2021-04-30 RX ORDER — LETROZOLE 2.5 MG/1
2.5 TABLET, FILM COATED ORAL DAILY
Qty: 5 TABLET | Refills: 0 | OUTPATIENT
Start: 2021-04-30

## 2021-04-30 NOTE — TELEPHONE ENCOUNTER
Requested Prescriptions   Pending Prescriptions Disp Refills     letrozole (FEMARA) 2.5 MG tablet 5 tablet 0     Sig: Take 1 tablet (2.5 mg) by mouth daily Start on day 3 of cycle; cycle day 3-7       There is no refill protocol information for this order        Last Written Prescription Date:  4/23/2021  Last Fill Quantity: 5,  # refills: 0   Last office visit: 2/8/2021 with prescribing provider:  Shima Go   Future Office Visit:  none

## 2021-05-05 LAB — ANDROST SERPL-MCNC: 2.17 NG/ML (ref 0.26–2.14)

## 2021-05-07 ENCOUNTER — OFFICE VISIT (OUTPATIENT)
Dept: INTERNAL MEDICINE | Facility: CLINIC | Age: 31
End: 2021-05-07
Payer: COMMERCIAL

## 2021-05-07 VITALS
HEART RATE: 97 BPM | TEMPERATURE: 98.8 F | BODY MASS INDEX: 29.22 KG/M2 | DIASTOLIC BLOOD PRESSURE: 78 MMHG | SYSTOLIC BLOOD PRESSURE: 116 MMHG | WEIGHT: 175.6 LBS | OXYGEN SATURATION: 99 %

## 2021-05-07 DIAGNOSIS — R59.1 LYMPHADENOPATHY: Primary | ICD-10-CM

## 2021-05-07 LAB
BASOPHILS # BLD AUTO: 0 10E9/L (ref 0–0.2)
BASOPHILS NFR BLD AUTO: 0.3 %
DIFFERENTIAL METHOD BLD: NORMAL
EOSINOPHIL # BLD AUTO: 0.1 10E9/L (ref 0–0.7)
EOSINOPHIL NFR BLD AUTO: 0.6 %
ERYTHROCYTE [DISTWIDTH] IN BLOOD BY AUTOMATED COUNT: 13.2 % (ref 10–15)
HCT VFR BLD AUTO: 40.5 % (ref 35–47)
HGB BLD-MCNC: 13.3 G/DL (ref 11.7–15.7)
LYMPHOCYTES # BLD AUTO: 3.4 10E9/L (ref 0.8–5.3)
LYMPHOCYTES NFR BLD AUTO: 32.1 %
MCH RBC QN AUTO: 27 PG (ref 26.5–33)
MCHC RBC AUTO-ENTMCNC: 32.8 G/DL (ref 31.5–36.5)
MCV RBC AUTO: 82 FL (ref 78–100)
MONOCYTES # BLD AUTO: 0.7 10E9/L (ref 0–1.3)
MONOCYTES NFR BLD AUTO: 6.3 %
NEUTROPHILS # BLD AUTO: 6.4 10E9/L (ref 1.6–8.3)
NEUTROPHILS NFR BLD AUTO: 60.7 %
PLATELET # BLD AUTO: 336 10E9/L (ref 150–450)
RBC # BLD AUTO: 4.92 10E12/L (ref 3.8–5.2)
WBC # BLD AUTO: 10.5 10E9/L (ref 4–11)

## 2021-05-07 PROCEDURE — 99203 OFFICE O/P NEW LOW 30 MIN: CPT | Performed by: PHYSICIAN ASSISTANT

## 2021-05-07 PROCEDURE — 36415 COLL VENOUS BLD VENIPUNCTURE: CPT | Performed by: PHYSICIAN ASSISTANT

## 2021-05-07 PROCEDURE — 85025 COMPLETE CBC W/AUTO DIFF WBC: CPT | Performed by: PHYSICIAN ASSISTANT

## 2021-05-07 NOTE — PROGRESS NOTES
"    Assessment & Plan     Lymphadenopathy    - CBC with platelets differential  - US Head Neck Soft Tissue; Future             BMI:   Estimated body mass index is 29.22 kg/m  as calculated from the following:    Height as of 4/26/21: 1.651 m (5' 5\").    Weight as of this encounter: 79.7 kg (175 lb 9.6 oz).   Weight management plan: Patient was referred to their PCP to discuss a diet and exercise plan.    Lab and ultrasound to eval   Enlarge posterior cervical lymph node noted     No follow-ups on file.    Geraldine Quinteros PA-C  [facility] RADHABannerDANYEL Ellington is a 30 year old who presents for the following health issues     HPI     Concern - Swollen lymph nodes in neck  Onset: January  Description: Got a bunch of swollen lymph nodes after covid vaccine and some have not gone away  Intensity: mild  Progression of Symptoms:  same  Accompanying Signs & Symptoms: Feels uncomfortable but not painful  Previous history of similar problem:   Precipitating factors:        Worsened by:   Alleviating factors:        Improved by:   Therapies tried and outcome:         Review of Systems   Constitutional, HEENT, cardiovascular, pulmonary, gi and gu systems are negative, except as otherwise noted.      Objective    /78   Pulse 97   Temp 98.8  F (37.1  C) (Tympanic)   Wt 79.7 kg (175 lb 9.6 oz)   LMP 04/22/2021 (Exact Date)   SpO2 99%   BMI 29.22 kg/m    Body mass index is 29.22 kg/m .  Physical Exam   GENERAL: healthy, alert and no distress  HENT: normal cephalic/atraumatic and ear canals and TM's normal  NECK:  no asymmetry, masses, or scars and thyroid normal to palpation  Palpation of the neck, noted one enlarged posterior cervical lymph node. No tenderness  No other lymph node enlargement noted.   RESP: lungs clear to auscultation - no rales, rhonchi or wheezes  CV: regular rates and rhythm and normal S1 S2, no S3 or S4  SKIN: no suspicious lesions or rashes                "

## 2021-05-11 ENCOUNTER — HOSPITAL ENCOUNTER (OUTPATIENT)
Dept: ULTRASOUND IMAGING | Facility: CLINIC | Age: 31
Discharge: HOME | End: 2021-05-11
Attending: PHYSICIAN ASSISTANT | Admitting: PHYSICIAN ASSISTANT
Payer: COMMERCIAL

## 2021-05-11 DIAGNOSIS — R59.1 LYMPHADENOPATHY: ICD-10-CM

## 2021-05-11 PROCEDURE — 76536 US EXAM OF HEAD AND NECK: CPT

## 2021-05-17 DIAGNOSIS — E28.2 PCOS (POLYCYSTIC OVARIAN SYNDROME): ICD-10-CM

## 2021-05-17 RX ORDER — LETROZOLE 2.5 MG/1
2.5 TABLET, FILM COATED ORAL DAILY
Qty: 5 TABLET | Refills: 0 | Status: CANCELLED | OUTPATIENT
Start: 2021-05-17

## 2021-05-17 NOTE — TELEPHONE ENCOUNTER
Patient calling requesting refill and dose increase for letrozole (FEMARA) 2.5 MG tablet. Patient requesting 5mg tablet. Per recent OV from 4/26/21: Call next month for femara 5 mg if no pregnancy this month. Please advise.     Mathew AUSTIN RN

## 2021-06-18 DIAGNOSIS — E28.2 PCOS (POLYCYSTIC OVARIAN SYNDROME): ICD-10-CM

## 2021-06-18 RX ORDER — LETROZOLE 2.5 MG/1
TABLET, FILM COATED ORAL
Qty: 10 TABLET | Refills: 0 | OUTPATIENT
Start: 2021-06-18

## 2021-06-18 NOTE — TELEPHONE ENCOUNTER
Pt wants to hold off this month due to her  having low sperm count and they are looking further into that.    Joseline COUGHLIN R.N.

## 2021-06-18 NOTE — TELEPHONE ENCOUNTER
Left message on answering machine for patient to call back.  RX was faxed on 5/24. Did she use that.  Need update on status. LMP?  Hue Crisostomo RN

## 2021-07-17 ENCOUNTER — HOSPITAL ENCOUNTER (EMERGENCY)
Facility: CLINIC | Age: 31
Discharge: HOME | End: 2021-07-18
Attending: EMERGENCY MEDICINE | Admitting: EMERGENCY MEDICINE
Payer: COMMERCIAL

## 2021-07-17 DIAGNOSIS — G47.00 INSOMNIA, UNSPECIFIED TYPE: ICD-10-CM

## 2021-07-17 DIAGNOSIS — R53.83 FATIGUE, UNSPECIFIED TYPE: ICD-10-CM

## 2021-07-17 DIAGNOSIS — R42 DIZZINESS: ICD-10-CM

## 2021-07-17 LAB
BASOPHILS # BLD AUTO: 0 10E3/UL (ref 0–0.2)
BASOPHILS NFR BLD AUTO: 0 %
EOSINOPHIL # BLD AUTO: 0.2 10E3/UL (ref 0–0.7)
EOSINOPHIL NFR BLD AUTO: 2 %
ERYTHROCYTE [DISTWIDTH] IN BLOOD BY AUTOMATED COUNT: 13 % (ref 10–15)
HCG SERPL QL: NEGATIVE
HCT VFR BLD AUTO: 39.9 % (ref 35–47)
HGB BLD-MCNC: 12.8 G/DL (ref 11.7–15.7)
IMM GRANULOCYTES # BLD: 0 10E3/UL
IMM GRANULOCYTES NFR BLD: 0 %
LYMPHOCYTES # BLD AUTO: 3.1 10E3/UL (ref 0.8–5.3)
LYMPHOCYTES NFR BLD AUTO: 34 %
MCH RBC QN AUTO: 26.1 PG (ref 26.5–33)
MCHC RBC AUTO-ENTMCNC: 32.1 G/DL (ref 31.5–36.5)
MCV RBC AUTO: 81 FL (ref 78–100)
MONOCYTES # BLD AUTO: 0.7 10E3/UL (ref 0–1.3)
MONOCYTES NFR BLD AUTO: 7 %
NEUTROPHILS # BLD AUTO: 5.2 10E3/UL (ref 1.6–8.3)
NEUTROPHILS NFR BLD AUTO: 57 %
NRBC # BLD AUTO: 0 10E3/UL
NRBC BLD AUTO-RTO: 0 /100
PLATELET # BLD AUTO: 388 10E3/UL (ref 150–450)
RBC # BLD AUTO: 4.91 10E6/UL (ref 3.8–5.2)
WBC # BLD AUTO: 9.2 10E3/UL (ref 4–11)

## 2021-07-17 PROCEDURE — 83735 ASSAY OF MAGNESIUM: CPT | Performed by: EMERGENCY MEDICINE

## 2021-07-17 PROCEDURE — 80048 BASIC METABOLIC PNL TOTAL CA: CPT | Performed by: EMERGENCY MEDICINE

## 2021-07-17 PROCEDURE — 36415 COLL VENOUS BLD VENIPUNCTURE: CPT | Performed by: EMERGENCY MEDICINE

## 2021-07-17 PROCEDURE — 84443 ASSAY THYROID STIM HORMONE: CPT | Performed by: EMERGENCY MEDICINE

## 2021-07-17 PROCEDURE — 36592 COLLECT BLOOD FROM PICC: CPT | Performed by: EMERGENCY MEDICINE

## 2021-07-17 PROCEDURE — 96361 HYDRATE IV INFUSION ADD-ON: CPT

## 2021-07-17 PROCEDURE — 96360 HYDRATION IV INFUSION INIT: CPT

## 2021-07-17 PROCEDURE — 84703 CHORIONIC GONADOTROPIN ASSAY: CPT | Performed by: EMERGENCY MEDICINE

## 2021-07-17 PROCEDURE — 85025 COMPLETE CBC W/AUTO DIFF WBC: CPT | Performed by: EMERGENCY MEDICINE

## 2021-07-17 PROCEDURE — 99284 EMERGENCY DEPT VISIT MOD MDM: CPT | Mod: 25

## 2021-07-17 PROCEDURE — 258N000003 HC RX IP 258 OP 636: Performed by: EMERGENCY MEDICINE

## 2021-07-17 RX ADMIN — SODIUM CHLORIDE 500 ML: 9 INJECTION, SOLUTION INTRAVENOUS at 22:50

## 2021-07-18 VITALS
BODY MASS INDEX: 29.62 KG/M2 | RESPIRATION RATE: 16 BRPM | SYSTOLIC BLOOD PRESSURE: 117 MMHG | WEIGHT: 178 LBS | OXYGEN SATURATION: 98 % | DIASTOLIC BLOOD PRESSURE: 69 MMHG | HEART RATE: 71 BPM | TEMPERATURE: 97.7 F

## 2021-07-18 LAB
ANION GAP SERPL CALCULATED.3IONS-SCNC: 4 MMOL/L (ref 3–14)
BUN SERPL-MCNC: 15 MG/DL (ref 7–30)
CALCIUM SERPL-MCNC: 9.1 MG/DL (ref 8.5–10.1)
CHLORIDE BLD-SCNC: 108 MMOL/L (ref 94–109)
CO2 SERPL-SCNC: 27 MMOL/L (ref 20–32)
CREAT SERPL-MCNC: 0.84 MG/DL (ref 0.52–1.04)
GFR SERPL CREATININE-BSD FRML MDRD: >90 ML/MIN/1.73M2
GLUCOSE BLD-MCNC: 92 MG/DL (ref 70–99)
MAGNESIUM SERPL-MCNC: 2.2 MG/DL (ref 1.6–2.3)
POTASSIUM BLD-SCNC: 3.8 MMOL/L (ref 3.4–5.3)
SODIUM SERPL-SCNC: 139 MMOL/L (ref 133–144)
TSH SERPL DL<=0.005 MIU/L-ACNC: 0.44 MU/L (ref 0.4–4)

## 2021-07-18 RX ORDER — TRAZODONE HYDROCHLORIDE 100 MG/1
50-100 TABLET ORAL AT BEDTIME
Qty: 14 TABLET | Refills: 0 | Status: SHIPPED | OUTPATIENT
Start: 2021-07-18 | End: 2022-10-12

## 2021-07-18 NOTE — ED PROVIDER NOTES
History     Chief Complaint:    Dizziness and Fatigue      HPI   Rakel Shaw is a 31 year old female who presents with dizziness and fatigue worsening over 2 weeks.  Had recent diagnosis of PCOS which has caused her stress.  Is now on letrozole.  Has noted weight gain as well.  States she is not eating as well because she doesn't have the energy to cook.  No heavy bleeding, headaches, palpitations, shortness of breath, vomiting, diarrhea.  No known history of thyroid conditions.    Review of Systems  Ten system ROS reviewed and is negative except as above     Allergies:      Seafood  Pcn [Penicillins]      Medications:      CALCIUM ACETATE-MAGNESIUM CARB PO  Cholecalciferol (VITAMIN D3) 50 MCG (2000 UT) CAPS  ferrous sulfate (FEROSUL) 325 (65 Fe) MG tablet  folic acid 800 MCG tablet  letrozole (FEMARA) 2.5 MG tablet  letrozole (FEMARA) 2.5 MG tablet  metFORMIN (GLUCOPHAGE) 500 MG tablet  Prenatal Vit-Fe Fumarate-FA (PRENATAL VITAMIN PO)        Past Medical History:      Patient Active Problem List    Diagnosis Date Noted     PCOS (polycystic ovarian syndrome) 02/08/2021     Priority: Medium     Breast lump on left side at 6 o'clock position 09/29/2020     Priority: Medium     Rhabdomyolysis 06/16/2019     Priority: Medium        Past Surgical History:      Past Surgical History:   Procedure Laterality Date     NO HISTORY OF SURGERY         Family History:      Family History   Problem Relation Age of Onset     Hypertension Mother      Hyperlipidemia Father      Coronary Artery Disease Father      Diabetes Father      Thyroid Disease Sister      Diabetes Sister          Physical Exam     Patient Vitals for the past 24 hrs:   BP Temp Temp src Pulse Resp SpO2 Weight   07/17/21 2206 132/72 97.7  F (36.5  C) Oral 87 18 99 % 80.7 kg (178 lb)       Physical Exam  Eyes:  Sclera white; Pupils are equal and round  ENT:    External ears and nares normal, no goiter  CV:  Rate as above with regular rhythm   Resp:  Breath  sounds clear and equal bilaterally    Non-labored, no retractions or accessory muscle use  GI:  Abdomen is soft, non-tender, non-distended    No rebound tenderness or peritoneal features  MS:  Moves all extremities  Skin:  Warm and dry  Neuro:  Speech is normal and fluent. No apparent deficit.    Emergency Department Course     Labs Ordered and Resulted from Time of ED Arrival Up to the Time of Departure from the ED   CBC WITH PLATELETS AND DIFFERENTIAL - Abnormal; Notable for the following components:       Result Value    MCH 26.1 (*)     All other components within normal limits   BASIC METABOLIC PANEL - Normal   MAGNESIUM - Normal   TSH WITH FREE T4 REFLEX - Normal   HCG QUALITATIVE PREGNANCY - Normal   PERIPHERAL IV CATHETER   CBC WITH PLATELETS & DIFFERENTIAL    Narrative:     The following orders were created for panel order CBC with platelets differential.  Procedure                               Abnormality         Status                     ---------                               -----------         ------                     CBC with platelets and d...[[id number]]  Abnormal            Final result                 Please view results for these tests on the individual orders.      Interventions:    Medications   0.9% sodium chloride BOLUS (0 mLs Intravenous Stopped 7/18/21 0038)       Impression & Plan      Medical Decision Making:  Symptoms concerning for possible metabolic, thyroid, or hormonal abnormality.  Differential also includes infections, uremia, anemia.  Blood work normal.  Not pregnant.  Discussed sleep patterns more.  Would like to sleep at 11pm but cannot fall asleep until 2am.  Tired when she wakes up and sleeps during the day.  Would benefit from improved sleep.  Will trial trazodone.  Discussed sleep hygiene and made recommendations for healthy ways of eating even when not always cooking.  Close follow-up in clinic.    Diagnosis:    ICD-10-CM    1. Dizziness  R42    2. Fatigue, unspecified  type  R53.83    3. Insomnia, unspecified type  G47.00        Discharge Medications:  Discharge Medication List as of 7/18/2021 12:39 AM      START taking these medications    Details   traZODone (DESYREL) 100 MG tablet Take 0.5-1 tablets ( mg) by mouth At Bedtime, Disp-14 tablet, R-0, E-Prescribe               Scribe Disclosure:  No Sabi Oneal MD  07/18/21 0225

## 2021-09-25 ENCOUNTER — HEALTH MAINTENANCE LETTER (OUTPATIENT)
Age: 31
End: 2021-09-25

## 2021-11-20 ENCOUNTER — HEALTH MAINTENANCE LETTER (OUTPATIENT)
Age: 31
End: 2021-11-20

## 2022-07-08 ENCOUNTER — HOSPITAL ENCOUNTER (EMERGENCY)
Facility: CLINIC | Age: 32
Discharge: LEFT BEFORE DISCHARGE | End: 2022-07-08
Payer: COMMERCIAL

## 2022-07-08 ENCOUNTER — HOSPITAL ENCOUNTER (EMERGENCY)
Facility: CLINIC | Age: 32
Discharge: HOME | End: 2022-07-08
Attending: EMERGENCY MEDICINE | Admitting: EMERGENCY MEDICINE
Payer: COMMERCIAL

## 2022-07-08 VITALS
OXYGEN SATURATION: 99 % | TEMPERATURE: 98.6 F | RESPIRATION RATE: 16 BRPM | DIASTOLIC BLOOD PRESSURE: 72 MMHG | SYSTOLIC BLOOD PRESSURE: 111 MMHG | HEART RATE: 71 BPM

## 2022-07-08 VITALS
RESPIRATION RATE: 18 BRPM | HEART RATE: 87 BPM | DIASTOLIC BLOOD PRESSURE: 95 MMHG | TEMPERATURE: 97.5 F | SYSTOLIC BLOOD PRESSURE: 138 MMHG | OXYGEN SATURATION: 100 %

## 2022-07-08 DIAGNOSIS — R42 VERTIGO: ICD-10-CM

## 2022-07-08 LAB
ANION GAP SERPL CALCULATED.3IONS-SCNC: 7 MMOL/L (ref 3–14)
BASOPHILS # BLD AUTO: 0 10E3/UL (ref 0–0.2)
BASOPHILS NFR BLD AUTO: 0 %
BUN SERPL-MCNC: 13 MG/DL (ref 7–30)
CALCIUM SERPL-MCNC: 8.9 MG/DL (ref 8.5–10.1)
CHLORIDE BLD-SCNC: 104 MMOL/L (ref 94–109)
CO2 SERPL-SCNC: 26 MMOL/L (ref 20–32)
CREAT SERPL-MCNC: 0.61 MG/DL (ref 0.52–1.04)
EOSINOPHIL # BLD AUTO: 0 10E3/UL (ref 0–0.7)
EOSINOPHIL NFR BLD AUTO: 0 %
ERYTHROCYTE [DISTWIDTH] IN BLOOD BY AUTOMATED COUNT: 13.8 % (ref 10–15)
GFR SERPL CREATININE-BSD FRML MDRD: >90 ML/MIN/1.73M2
GLUCOSE BLD-MCNC: 92 MG/DL (ref 70–99)
HCG SERPL QL: NEGATIVE
HCT VFR BLD AUTO: 43.7 % (ref 35–47)
HGB BLD-MCNC: 13.4 G/DL (ref 11.7–15.7)
HOLD SPECIMEN: NORMAL
IMM GRANULOCYTES # BLD: 0 10E3/UL
IMM GRANULOCYTES NFR BLD: 0 %
LYMPHOCYTES # BLD AUTO: 3.4 10E3/UL (ref 0.8–5.3)
LYMPHOCYTES NFR BLD AUTO: 33 %
MCH RBC QN AUTO: 25.4 PG (ref 26.5–33)
MCHC RBC AUTO-ENTMCNC: 30.7 G/DL (ref 31.5–36.5)
MCV RBC AUTO: 83 FL (ref 78–100)
MONOCYTES # BLD AUTO: 0.7 10E3/UL (ref 0–1.3)
MONOCYTES NFR BLD AUTO: 7 %
NEUTROPHILS # BLD AUTO: 6.2 10E3/UL (ref 1.6–8.3)
NEUTROPHILS NFR BLD AUTO: 60 %
NRBC # BLD AUTO: 0 10E3/UL
NRBC BLD AUTO-RTO: 0 /100
PLATELET # BLD AUTO: 386 10E3/UL (ref 150–450)
POTASSIUM BLD-SCNC: 3.8 MMOL/L (ref 3.4–5.3)
RBC # BLD AUTO: 5.27 10E6/UL (ref 3.8–5.2)
SODIUM SERPL-SCNC: 137 MMOL/L (ref 133–144)
WBC # BLD AUTO: 10.5 10E3/UL (ref 4–11)

## 2022-07-08 PROCEDURE — 93005 ELECTROCARDIOGRAM TRACING: CPT

## 2022-07-08 PROCEDURE — 80048 BASIC METABOLIC PNL TOTAL CA: CPT | Performed by: EMERGENCY MEDICINE

## 2022-07-08 PROCEDURE — 250N000013 HC RX MED GY IP 250 OP 250 PS 637: Performed by: EMERGENCY MEDICINE

## 2022-07-08 PROCEDURE — 84703 CHORIONIC GONADOTROPIN ASSAY: CPT | Performed by: EMERGENCY MEDICINE

## 2022-07-08 PROCEDURE — 99284 EMERGENCY DEPT VISIT MOD MDM: CPT

## 2022-07-08 PROCEDURE — 36415 COLL VENOUS BLD VENIPUNCTURE: CPT | Performed by: EMERGENCY MEDICINE

## 2022-07-08 PROCEDURE — 85004 AUTOMATED DIFF WBC COUNT: CPT | Performed by: EMERGENCY MEDICINE

## 2022-07-08 RX ORDER — MECLIZINE HYDROCHLORIDE 25 MG/1
25 TABLET ORAL 3 TIMES DAILY PRN
Qty: 30 TABLET | Refills: 0 | Status: SHIPPED | OUTPATIENT
Start: 2022-07-08 | End: 2022-10-12

## 2022-07-08 RX ORDER — MECLIZINE HYDROCHLORIDE 25 MG/1
25 TABLET ORAL ONCE
Status: COMPLETED | OUTPATIENT
Start: 2022-07-08 | End: 2022-07-08

## 2022-07-08 RX ADMIN — MECLIZINE HYDROCHLORIDE 25 MG: 25 TABLET ORAL at 15:48

## 2022-07-08 ASSESSMENT — ENCOUNTER SYMPTOMS
DIZZINESS: 1
ABDOMINAL PAIN: 0
NAUSEA: 1
SHORTNESS OF BREATH: 0

## 2022-07-08 NOTE — ED PROVIDER NOTES
History   Chief Complaint:  Dizziness       The history is provided by the patient.      Rakel Shaw is a 32 year old female who presents with vertigo. The patient reports in December of 2021 she had COVID-19 and began to experience vertigo sensation.  At times she feels that she is floating or off balance.  This has waxed and waned.  She has not had any formal evaluation.  In the last week or so her symptoms have become more prominent.  No focal weakness or paresthesias.  No headache or any pain.  She has occasionally somewhat nauseous but does not vomit. She denies double vision, shortness of breath, and abdominal pain. Denies diabetes, history of anxiety and mental illness. She notes she has PCOS and takes metformin but held this a couple days ago when she began to have more prominent symptoms.     Review of Systems   Eyes: Negative for visual disturbance.   Respiratory: Negative for shortness of breath.    Cardiovascular: Positive for chest pain (Heavy).   Gastrointestinal: Positive for nausea. Negative for abdominal pain.   Neurological: Positive for dizziness (Vertigo).   All other systems reviewed and are negative.    Allergies:    Seafood  Pcn [Penicillins]    Medications:    meclizine (ANTIVERT) 25 MG tablet  CALCIUM ACETATE-MAGNESIUM CARB PO  Cholecalciferol (VITAMIN D3) 50 MCG (2000 UT) CAPS  ferrous sulfate (FEROSUL) 325 (65 Fe) MG tablet  folic acid 800 MCG tablet  letrozole (FEMARA) 2.5 MG tablet  letrozole (FEMARA) 2.5 MG tablet  metFORMIN (GLUCOPHAGE) 500 MG tablet  Prenatal Vit-Fe Fumarate-FA (PRENATAL VITAMIN PO)  traZODone (DESYREL) 100 MG tablet        Past Medical History:       Patient Active Problem List   Diagnosis     Rhabdomyolysis     Breast lump on left side at 6 o'clock position     PCOS (polycystic ovarian syndrome)        Past Surgical History:      Past Surgical History:   Procedure Laterality Date     NO HISTORY OF SURGERY          Family History:      Family History   Problem  Relation Age of Onset     Hypertension Mother      Hyperlipidemia Father      Coronary Artery Disease Father      Diabetes Father      Thyroid Disease Sister      Diabetes Sister        Social History:    PCP: [unfilled]       Physical Exam     Patient Vitals for the past 24 hrs:   BP Temp Temp src Pulse Resp SpO2   07/08/22 1523 122/78 -- -- 72 14 100 %   07/08/22 1140 120/81 -- -- -- 18 --   07/08/22 1136 -- 98.6  F (37  C) Temporal 79 -- 98 %       Physical Exam  Constitutional:       General: She is not in acute distress.     Appearance: She is not diaphoretic.   HENT:      Head: Atraumatic.      Mouth/Throat:      Pharynx: No oropharyngeal exudate.   Eyes:      General: No scleral icterus.     Pupils: Pupils are equal, round, and reactive to light.   Cardiovascular:      Heart sounds: Normal heart sounds.   Pulmonary:      Effort: No respiratory distress.      Breath sounds: Normal breath sounds.   Abdominal:      General: Bowel sounds are normal.      Palpations: Abdomen is soft.      Tenderness: There is no abdominal tenderness.   Musculoskeletal:         General: No tenderness.   Skin:     General: Skin is warm.      Findings: No rash.           Emergency Department Course   ECG  ECG taken at 1203, ECG read at 1530  Normal sinus rhythm with sinus arrhythmia   Rate 67 bpm. UT interval 150 ms. QRS duration 82 ms. QT/QTc 402/424 ms. P-R-T axes 41 43 39.       Laboratory:  Labs Ordered and Resulted from Time of ED Arrival to Time of ED Departure   CBC WITH PLATELETS AND DIFFERENTIAL - Abnormal       Result Value    WBC Count 10.5      RBC Count 5.27 (*)     Hemoglobin 13.4      Hematocrit 43.7      MCV 83      MCH 25.4 (*)     MCHC 30.7 (*)     RDW 13.8      Platelet Count 386      % Neutrophils 60      % Lymphocytes 33      % Monocytes 7      % Eosinophils 0      % Basophils 0      % Immature Granulocytes 0      NRBCs per 100 WBC 0      Absolute Neutrophils 6.2      Absolute Lymphocytes 3.4      Absolute  Monocytes 0.7      Absolute Eosinophils 0.0      Absolute Basophils 0.0      Absolute Immature Granulocytes 0.0      Absolute NRBCs 0.0     BASIC METABOLIC PANEL - Normal    Sodium 137      Potassium 3.8      Chloride 104      Carbon Dioxide (CO2) 26      Anion Gap 7      Urea Nitrogen 13      Creatinine 0.61      Calcium 8.9      Glucose 92      GFR Estimate >90     HCG QUALITATIVE PREGNANCY - Normal    hCG Serum Qualitative Negative        Emergency Department Course:     Reviewed:  I reviewed nursing notes, vitals, past medical history and Care Everywhere.    Assessments:  1533 I obtained history and examined the patient as noted above.     Disposition:  The patient was discharged to home.     Impression & Plan     Medical Decision Making:  This patient is a 32-year-old woman who presents with vertigo sensation.  This may be vestibular neuritis following her COVID-19 infection.  Her symptoms have waxed and waned but a bit more prominent.  Lab work-up overall looks good.  She feels improved with a dose of meclizine here in the ED.  She will have a prescription to go home with and was cautioned not to drive while taking it.  She will be referred to neurology given her ongoing symptoms.        Diagnosis:    ICD-10-CM    1. Vertigo  R42        Discharge Medications:  New Prescriptions    MECLIZINE (ANTIVERT) 25 MG TABLET    Take 1 tablet (25 mg) by mouth 3 times daily as needed for dizziness       Scribe Disclosure:  I, Susan Dubon, am serving as a scribe at 3:44 PM on 7/8/2022 to document services personally performed by Fernando Tabor, * based on my observations and the provider's statements to me.            Fernando Tabor MD  07/08/22 0500

## 2022-07-08 NOTE — ED TRIAGE NOTES
Pt states she is unsure if she is having anxiety but describes a floating sensation, bilateral leg pain, and pressure in the chest area. States this started yesterday.      Triage Assessment     Row Name 07/07/22 2037       Triage Assessment (Adult)    Airway WDL WDL       Respiratory WDL    Respiratory WDL WDL       Skin Circulation/Temperature WDL    Skin Circulation/Temperature WDL WDL       Cardiac WDL    Cardiac WDL WDL       Peripheral/Neurovascular WDL    Peripheral Neurovascular WDL WDL       Cognitive/Neuro/Behavioral WDL    Cognitive/Neuro/Behavioral WDL WDL

## 2022-07-08 NOTE — DISCHARGE INSTRUCTIONS
Return to the ER for worsening symptoms or any new concerns.    Do not drive while taking meclizine.

## 2022-07-08 NOTE — ED TRIAGE NOTES
"Pt presents to ED with weakness, tingling to feet, headache and dizziness Symptoms of \"feeling floaty\" started after having covid in January and had been coming and going but seems to be getting worse. On metformin for PCOS. Here yesterday for similar symptoms but was not seen due to long wait time. Went home to sleep, but \"still feels off.\"     Triage Assessment     Row Name 07/08/22 1136       Triage Assessment (Adult)    Airway WDL WDL              "

## 2022-07-11 LAB
ATRIAL RATE - MUSE: 67 BPM
DIASTOLIC BLOOD PRESSURE - MUSE: NORMAL MMHG
INTERPRETATION ECG - MUSE: NORMAL
P AXIS - MUSE: 41 DEGREES
PR INTERVAL - MUSE: 150 MS
QRS DURATION - MUSE: 82 MS
QT - MUSE: 402 MS
QTC - MUSE: 424 MS
R AXIS - MUSE: 43 DEGREES
SYSTOLIC BLOOD PRESSURE - MUSE: NORMAL MMHG
T AXIS - MUSE: 39 DEGREES
VENTRICULAR RATE- MUSE: 67 BPM

## 2022-08-22 ENCOUNTER — LAB (OUTPATIENT)
Dept: LAB | Facility: CLINIC | Age: 32
End: 2022-08-22
Payer: COMMERCIAL

## 2022-08-22 DIAGNOSIS — Z32.01 PREGNANCY TEST POSITIVE: ICD-10-CM

## 2022-08-22 PROCEDURE — 84702 CHORIONIC GONADOTROPIN TEST: CPT

## 2022-08-22 PROCEDURE — 36415 COLL VENOUS BLD VENIPUNCTURE: CPT

## 2022-08-23 LAB — B-HCG SERPL-ACNC: <1 IU/L (ref 0–5)

## 2022-09-21 ENCOUNTER — TELEPHONE (OUTPATIENT)
Dept: OBGYN | Facility: CLINIC | Age: 32
End: 2022-09-21

## 2022-09-21 ENCOUNTER — LAB (OUTPATIENT)
Dept: LAB | Facility: CLINIC | Age: 32
End: 2022-09-21
Payer: COMMERCIAL

## 2022-09-21 DIAGNOSIS — Z32.01 PREGNANCY TEST POSITIVE: Primary | ICD-10-CM

## 2022-09-21 DIAGNOSIS — Z32.01 PREGNANCY TEST POSITIVE: ICD-10-CM

## 2022-09-21 LAB — B-HCG SERPL-ACNC: 119 IU/L (ref 0–5)

## 2022-09-21 PROCEDURE — 36415 COLL VENOUS BLD VENIPUNCTURE: CPT

## 2022-09-21 PROCEDURE — 84702 CHORIONIC GONADOTROPIN TEST: CPT

## 2022-09-21 NOTE — TELEPHONE ENCOUNTER
Patient is newly pregnant with 1st baby- She miscarried last year. She says ME told her that she would like her to schedule an hcg lab first-her lmp was 8/20-Please call pt back today

## 2022-09-23 ENCOUNTER — LAB (OUTPATIENT)
Dept: LAB | Facility: CLINIC | Age: 32
End: 2022-09-23
Payer: COMMERCIAL

## 2022-09-23 DIAGNOSIS — Z32.01 PREGNANCY TEST POSITIVE: ICD-10-CM

## 2022-09-23 LAB — B-HCG SERPL-ACNC: 443 IU/L (ref 0–5)

## 2022-09-23 PROCEDURE — 84702 CHORIONIC GONADOTROPIN TEST: CPT

## 2022-09-23 PROCEDURE — 36415 COLL VENOUS BLD VENIPUNCTURE: CPT

## 2022-09-24 NOTE — RESULT ENCOUNTER NOTE
Colin,    Your HCG level is going up double if not even a bit more. This is definitely reassuring.  You can certainly contact Dr. Go through our nurses on Monday next week and see if she wants you to do more labs or get an earlier ultrasound than just the first OB appointment one at 8 weeks.    However for now everything is looking good.    Brenna Parker MD

## 2022-09-26 ENCOUNTER — TELEPHONE (OUTPATIENT)
Dept: OBGYN | Facility: CLINIC | Age: 32
End: 2022-09-26

## 2022-09-26 NOTE — TELEPHONE ENCOUNTER
Pt asking about new pregnancy  HCG's done last wk w appropriate rise and Dr Parker sent her a message.    Pt asking if Dr Go was going to recommend progesterone d/t her hx of PCOS and it was discussed w her at her last visit. How to proceed w f/u and/or progesterone - routing to provider to advise.    Kanchan Garcia RN on 9/26/2022 at 10:24 AM

## 2022-09-26 NOTE — TELEPHONE ENCOUNTER
Rakel transferred care to Dr. Hopkins's I recommend asking her.  Usually progesterone is used with a history of miscarriages and from the midluteal phase cycle day 21. I don't usually prescribe it for patients with PCOS and who are already pregnant.

## 2022-09-26 NOTE — TELEPHONE ENCOUNTER
Reference TE from 9/21    Patient calling to set up her New Prenatal Visits. I explained the sequence/dating of appointments and patient said she would like to speak to a nurse first before scheduling. Patient said she has PCOS that has resulted in MC in the past.     Patient said she wants to see MD/Ultrasound ASAP and was told she may need progesterone from prior Dr. Guerrero (who was her prior MD).

## 2022-09-26 NOTE — TELEPHONE ENCOUNTER
Called and informed pt of below. She is planning to see Dr Go.  Pt verbalized understanding of the progesterone - not necessary at this time. Can schedule routine OB care starting at 8-10 wks.     Pt verbalized understanding, in agreement with plan, and voiced no further questions.  Transferred call to scheduling.    Kanchan Garcia RN on 9/26/2022 at 1:32 PM

## 2022-10-04 NOTE — PATIENT INSTRUCTIONS
Thank you for coming to see the Midwives at the   [facility]!    Please take prenatal vitamin with DHA and vitamin D3 2,000-3,000 international unit(s) once daily during the entire pregnancy.    We will notify you about your labs that were drawn today once we get the results back.  If you have MyChart your lab results will be posted there.    Someone from the clinic will send you a [employer] message or call you personally with your results.    If you need any refills of medications please call your pharmacy and they will contact us.    If you have a medical emergency please call 911.    If you have any concerns about today's visit, wish to schedule another appointment, or have an urgent medical concern please call our office at [phone]. You can also make appointments through [employer].    After hours you may also call the clinic number above to be connected with [facility] after hours triage nurse.  The nurse can page the midwife on call if needed. There is always a midwife on call 24 hours a day.    Prenatal Care Recommendations:    Before 14 weeks: Dating ultrasound, genetic testing       This ultrasound helps us determine your dates accurately. Innatal (genetic screening test) can be drawn anytime after 10 weeks of gestation.    16 weeks: Optional genetic testing single AFP       This testing helps understand your baby's risk for some genetic abnormalities.    18-22 weeks:  Screening anatomy ultrasound       This testing will look for early growth abnormalities, placenta location, and may tell the baby's gender if you wish to find out.    24-27 weeks: One hour diabetes test (GCT) and complete blood count       This test helps identify diabetes of pregnancy or gestational diabetes.  We also look   at the iron in your blood and how well your blood clots.    28 - 36 weeks: Tetanus shot (Tdap)       This shot helps protect you and your baby from whooping cough.    36 weeks and later: Group B  Strep test (GBS)       This test helps predict if you need antibiotics in labor to prevent infection for your baby.    Anytime September to April:  Flu shot       This shot helps protect you and your family from the flu.  This is especially important during pregnancy.        The typical schedule after your first visit today you can expect:     Visit 2 - 12-16 weeks  Visit 3 - 20 weeks  Visit 4 - 24 weeks  Visit 5 - 28 weeks  Visit 6 - 30 weeks  Visit 7 - 32 weeks  Visit 8 - 34 weeks  Visit 9 - 36 weeks  Weekly after 36 weeks until delivery.        Any time during or after your pregnancy you may experience increased depression and/or mood changes.    We are here to support you. Please contact us if you are:  Feeling anxious  Overwhelmed or sad   Trouble sleeping  Crying uncontrollably  Trouble caring for yourself or baby.  Any thoughts of hurting yourself, your baby, or anyone else    If anything comes up between your visits or you have concerns please don't hesitate to contact us.    Secure access to your medical record:  Use [employer] (secure email communication and access to your chart) to send your primary care provider a message or make an appointment. Ask someone on your Team how to sign up for [employer]. To log on to [employer] or for more information in [employer] please visit the website at [email or web address].       Certified Nurse Midwife (CNM) Team    SAGAR Corbett CNM Melissa Kitzman APRN, CNM, [facility]-BC  JOHN Dee DNP, JOHN Adams DNP, SAGAR    Link to Midwifery Care website: [email or web address]      Again, thank you for choosing the midwives at [facility].  We are excited to be a part of your pregnancy! Please let us know how we can best partner with you to improve your and your family's health.

## 2022-10-11 ENCOUNTER — MYC MEDICAL ADVICE (OUTPATIENT)
Dept: OBGYN | Facility: CLINIC | Age: 32
End: 2022-10-11

## 2022-10-11 DIAGNOSIS — N39.0 UTI (URINARY TRACT INFECTION): Primary | ICD-10-CM

## 2022-10-11 ASSESSMENT — ANXIETY QUESTIONNAIRES
3. WORRYING TOO MUCH ABOUT DIFFERENT THINGS: NOT AT ALL
5. BEING SO RESTLESS THAT IT IS HARD TO SIT STILL: NOT AT ALL
GAD7 TOTAL SCORE: 1
4. TROUBLE RELAXING: NOT AT ALL
GAD7 TOTAL SCORE: 1
6. BECOMING EASILY ANNOYED OR IRRITABLE: NOT AT ALL
GAD7 TOTAL SCORE: 1
7. FEELING AFRAID AS IF SOMETHING AWFUL MIGHT HAPPEN: NOT AT ALL
7. FEELING AFRAID AS IF SOMETHING AWFUL MIGHT HAPPEN: NOT AT ALL
1. FEELING NERVOUS, ANXIOUS, OR ON EDGE: SEVERAL DAYS
IF YOU CHECKED OFF ANY PROBLEMS ON THIS QUESTIONNAIRE, HOW DIFFICULT HAVE THESE PROBLEMS MADE IT FOR YOU TO DO YOUR WORK, TAKE CARE OF THINGS AT HOME, OR GET ALONG WITH OTHER PEOPLE: NOT DIFFICULT AT ALL
8. IF YOU CHECKED OFF ANY PROBLEMS, HOW DIFFICULT HAVE THESE MADE IT FOR YOU TO DO YOUR WORK, TAKE CARE OF THINGS AT HOME, OR GET ALONG WITH OTHER PEOPLE?: NOT DIFFICULT AT ALL
2. NOT BEING ABLE TO STOP OR CONTROL WORRYING: NOT AT ALL

## 2022-10-11 ASSESSMENT — PATIENT HEALTH QUESTIONNAIRE - PHQ9
SUM OF ALL RESPONSES TO PHQ QUESTIONS 1-9: 2
SUM OF ALL RESPONSES TO PHQ QUESTIONS 1-9: 2
10. IF YOU CHECKED OFF ANY PROBLEMS, HOW DIFFICULT HAVE THESE PROBLEMS MADE IT FOR YOU TO DO YOUR WORK, TAKE CARE OF THINGS AT HOME, OR GET ALONG WITH OTHER PEOPLE: NOT DIFFICULT AT ALL

## 2022-10-12 ENCOUNTER — PRENATAL OFFICE VISIT (OUTPATIENT)
Dept: NURSING | Facility: CLINIC | Age: 32
End: 2022-10-12
Payer: COMMERCIAL

## 2022-10-12 DIAGNOSIS — O36.80X0 PREGNANCY WITH INCONCLUSIVE FETAL VIABILITY: ICD-10-CM

## 2022-10-12 DIAGNOSIS — Z13.79 GENETIC SCREENING: Primary | ICD-10-CM

## 2022-10-12 DIAGNOSIS — Z34.01 ENCOUNTER FOR SUPERVISION OF NORMAL FIRST PREGNANCY IN FIRST TRIMESTER: ICD-10-CM

## 2022-10-12 PROBLEM — Z34.00 SUPERVISION OF NORMAL IUP (INTRAUTERINE PREGNANCY) IN PRIMIGRAVIDA: Status: ACTIVE | Noted: 2022-10-12

## 2022-10-12 PROCEDURE — 99207 PR NO CHARGE NURSE ONLY: CPT

## 2022-10-12 NOTE — PROGRESS NOTES
SUBJECTIVE:     HPI:    This is a 32 year old female patient,  who presents for her first obstetrical visit.    MITESH: 2023, by Last Menstrual Period.  She is 7w0d weeks at time of nurse phone visit.  Her cycles are regular, every 28-30 days.  Her last menstrual period was normal.   Since her LMP, she has experienced fatigue and breast tenderness.   She denies nausea, emesis, abdominal pain, headache, loss of appetite, vaginal discharge, dysuria, pelvic pain, urinary urgency, lightheadedness, urinary frequency, vaginal bleeding, hemorrhoids and constipation.    Additional History: -first pregnancy    Have you travelled during the pregnancy?No  Have your sexual partner(s) travelled during the pregnancy?No    HISTORY:   Planned Pregnancy: Yes  Marital Status:  - Nicholas  Occupation:  at [employer]  Living in Household: Spouse    Past History:  Her past medical history   Past Medical History:   Diagnosis Date     NO ACTIVE PROBLEMS      Uncomplicated asthma     as child   .      She has a history of  First Pregnancy    Since her last LMP she denies use of alcohol, tobacco and street drugs.    Past medical, surgical, social and family history were reviewed and updated in EPIC.    Current Outpatient Medications   Medication     Cholecalciferol (VITAMIN D3) 50 MCG (2000 UT) CAPS     ferrous sulfate (FEROSUL) 325 (65 Fe) MG tablet     Prenatal Vit-Fe Fumarate-FA (PRENATAL VITAMIN PO)     No current facility-administered medications for this visit.       ROS:   12 point review of systems negative other than symptoms noted below or in the HPI.  Constitutional: Fatigue  Breast: Tenderness    Nurse phone visit completed. Prenatal book and folder (containing standard educational hand-outs and brochures) will be given at next visit to patient. Information in folder reviewed over the phone. Questions answered. Brochure given on optional screening available to assess chromosomal anomalies. Pt  desires NIPS. Pt advised to call the clinic if she has any questions or concerns related to her pregnancy. Prenatal labs future ordered. New prenatal visit scheduled on 10/17/22 with JULIA Vera CNM and plans to see Dr Go for pregnancy.  Kanchan SORTO      Lab Results   Component Value Date    PAP NIL 09/29/2020     PHQ-9 score:    PHQ 10/11/2022   PHQ-9 Total Score 2   Q9: Thoughts of better off dead/self-harm past 2 weeks Not at all       HAO-7 SCORE 1/24/2020 9/29/2020 10/11/2022   Total Score - - 1 (minimal anxiety)   Total Score 2 2 1     Patient supplied answers from flow sheet for:  Prenatal OB Questionnaire.  Past Medical History  Have you ever recieved care for your mental health? : No  Have you ever been in a major accident or suffered serious trauma?: No  Within the last year, has anyone hit, slapped, kicked or otherwise hurt you?: No  In the last year, has anyone forced you to have sex when you didn't want to?: No    Past Medical History 2   Have you ever received a blood transfusion?: No  Would you accept a blood transfusion if was medically recommended?: Yes  Does anyone in your home smoke?: No   Is your blood type Rh negative?: Unknown  Have you ever ?: (!) Yes  Have you been hospitalized for a nonsurgical reason excluding normal delivery?: (!) Yes  Have you ever had an abnormal pap smear?: No    Past Medical History (Continued)  Do you have a history of abnormalities of the uterus?: No  Did your mother take EDUARD or any other hormones when she was pregnant with you?: No  Do you have any other problems we have not asked about which you feel may be important to this pregnancy?: No

## 2022-10-17 ENCOUNTER — ANCILLARY PROCEDURE (OUTPATIENT)
Dept: ULTRASOUND IMAGING | Facility: CLINIC | Age: 32
End: 2022-10-17
Payer: COMMERCIAL

## 2022-10-17 ENCOUNTER — PRENATAL OFFICE VISIT (OUTPATIENT)
Dept: MIDWIFE SERVICES | Facility: CLINIC | Age: 32
End: 2022-10-17
Payer: COMMERCIAL

## 2022-10-17 VITALS — BODY MASS INDEX: 32.45 KG/M2 | DIASTOLIC BLOOD PRESSURE: 56 MMHG | SYSTOLIC BLOOD PRESSURE: 100 MMHG | WEIGHT: 195 LBS

## 2022-10-17 DIAGNOSIS — O36.80X0 PREGNANCY WITH INCONCLUSIVE FETAL VIABILITY: ICD-10-CM

## 2022-10-17 DIAGNOSIS — Z3A.01 7 WEEKS GESTATION OF PREGNANCY: ICD-10-CM

## 2022-10-17 DIAGNOSIS — O09.91 SUPERVISION OF HIGH RISK PREGNANCY IN FIRST TRIMESTER: Primary | ICD-10-CM

## 2022-10-17 DIAGNOSIS — Z34.01 ENCOUNTER FOR SUPERVISION OF NORMAL FIRST PREGNANCY IN FIRST TRIMESTER: ICD-10-CM

## 2022-10-17 DIAGNOSIS — Z23 NEED FOR PROPHYLACTIC VACCINATION AND INOCULATION AGAINST INFLUENZA: ICD-10-CM

## 2022-10-17 LAB
ALBUMIN UR-MCNC: NEGATIVE MG/DL
APPEARANCE UR: CLEAR
BACTERIA #/AREA URNS HPF: ABNORMAL /HPF
BILIRUB UR QL STRIP: NEGATIVE
COLOR UR AUTO: YELLOW
GLUCOSE UR STRIP-MCNC: NEGATIVE MG/DL
HGB UR QL STRIP: ABNORMAL
KETONES UR STRIP-MCNC: ABNORMAL MG/DL
LEUKOCYTE ESTERASE UR QL STRIP: NEGATIVE
NITRATE UR QL: NEGATIVE
PH UR STRIP: 5.5 [PH] (ref 5–7)
RBC #/AREA URNS AUTO: ABNORMAL /HPF
SP GR UR STRIP: >=1.03 (ref 1–1.03)
SQUAMOUS #/AREA URNS AUTO: ABNORMAL /LPF
UROBILINOGEN UR STRIP-ACNC: 1 E.U./DL
WBC #/AREA URNS AUTO: ABNORMAL /HPF

## 2022-10-17 PROCEDURE — 76801 OB US < 14 WKS SINGLE FETUS: CPT | Performed by: OBSTETRICS & GYNECOLOGY

## 2022-10-17 PROCEDURE — 99207 PR FIRST OB VISIT: CPT | Performed by: ADVANCED PRACTICE MIDWIFE

## 2022-10-17 PROCEDURE — 90686 IIV4 VACC NO PRSV 0.5 ML IM: CPT | Performed by: ADVANCED PRACTICE MIDWIFE

## 2022-10-17 PROCEDURE — 81001 URINALYSIS AUTO W/SCOPE: CPT | Performed by: ADVANCED PRACTICE MIDWIFE

## 2022-10-17 PROCEDURE — 90471 IMMUNIZATION ADMIN: CPT | Performed by: ADVANCED PRACTICE MIDWIFE

## 2022-10-17 PROCEDURE — 87086 URINE CULTURE/COLONY COUNT: CPT | Performed by: ADVANCED PRACTICE MIDWIFE

## 2022-10-17 NOTE — PROGRESS NOTES
SUBJECTIVE:      HPI:     This is a 32 year old female patient,  who presents for her first obstetrical visit. Here with Nicholas. Very happy! She is feeling well, a little tired, very hungry but not nauseous. Planning to see Dr. Go. History of breast lump on left side, last imaged in , no change in size since, done at [facility].      MITESH: 2023, by Last Menstrual Period.  She is 7w0d weeks at time of nurse phone visit.  Her cycles are regular, every 28-30 days.  Her last menstrual period was normal.   Since her LMP, she has experienced fatigue and breast tenderness.   She denies nausea, emesis, abdominal pain, headache, loss of appetite, vaginal discharge, dysuria, pelvic pain, urinary urgency, lightheadedness, urinary frequency, vaginal bleeding, hemorrhoids and constipation.     Additional History: first pregnancy     Have you travelled during the pregnancy?No  Have your sexual partner(s) travelled during the pregnancy?No     HISTORY:   Planned Pregnancy: Yes  Marital Status:  - Nicholas  Occupation:  at [facility] [employer]  Living in Household: Spouse     Past History:  Her past medical history   Past Medical History        Past Medical History:   Diagnosis Date     NO ACTIVE PROBLEMS       Uncomplicated asthma       as child      .       She has a history of  First Pregnancy     Since her last LMP she denies use of alcohol, tobacco and street drugs.     Past medical, surgical, social and family history were reviewed and updated in EPIC.    OB HISTORY  OB History    Para Term  AB Living   1 0 0 0 0 0   SAB IAB Ectopic Multiple Live Births   0 0 0 0 0      # Outcome Date GA Lbr Mike/2nd Weight Sex Delivery Anes PTL Lv   1 Current                PERSONAL HISTORY  Exercise Habits:  aerobic, walking and weight training  Employment: Full time.  Her job involves moderate activity with little potential for toxic exposure.    Travel plans:  are none planned.   Diet: eats regular  meals  Abuse concerns? Unable to assess  Hgb A1c screen:  BMI > 30: Yes, 1st degree family DM: No, History of GDM: No, PCOS: No, High risk ethnicity: No           Current Outpatient Medications   Medication     Cholecalciferol (VITAMIN D3) 50 MCG (2000 UT) CAPS     ferrous sulfate (FEROSUL) 325 (65 Fe) MG tablet     Prenatal Vit-Fe Fumarate-FA (PRENATAL VITAMIN PO)      No current facility-administered medications for this visit.         ROS:   12 point review of systems negative other than symptoms noted below or in the HPI.  Constitutional: Fatigue  Breast: Tenderness     Nurse phone visit completed. Prenatal book and folder (containing standard educational hand-outs and brochures) will be given at next visit to patient. Information in folder reviewed over the phone. Questions answered. Brochure given on optional screening available to assess chromosomal anomalies. Pt desires NIPS. Pt advised to call the clinic if she has any questions or concerns related to her pregnancy. Prenatal labs future ordered. New prenatal visit scheduled on 10/17/22 with JULIA Vera CNM and plans to see Dr Go for pregnancy.  Kanchan SORTO              Lab Results   Component Value Date     PAP NIL 09/29/2020      PHQ-9 score:    PHQ 10/11/2022   PHQ-9 Total Score 2   Q9: Thoughts of better off dead/self-harm past 2 weeks Not at all         HAO-7 SCORE 1/24/2020 9/29/2020 10/11/2022   Total Score - - 1 (minimal anxiety)   Total Score 2 2 1      Patient supplied answers from flow sheet for:  Prenatal OB Questionnaire.  Past Medical History  Have you ever recieved care for your mental health? : No  Have you ever been in a major accident or suffered serious trauma?: No  Within the last year, has anyone hit, slapped, kicked or otherwise hurt you?: No  In the last year, has anyone forced you to have sex when you didn't want to?: No     Past Medical History 2   Have you ever received a blood transfusion?: No  Would you accept a blood transfusion  if was medically recommended?: Yes  Does anyone in your home smoke?: No   Is your blood type Rh negative?: Unknown  Have you ever ?: (!) Yes  Have you been hospitalized for a nonsurgical reason excluding normal delivery?: (!) Yes  Have you ever had an abnormal pap smear?: No     Past Medical History (Continued)  Do you have a history of abnormalities of the uterus?: No  Did your mother take EDUARD or any other hormones when she was pregnant with you?: No  Do you have any other problems we have not asked about which you feel may be important to this pregnancy?: No         OBJECTIVE:     EXAM:  /56   Wt 88.5 kg (195 lb)   LMP 2022   BMI 32.45 kg/m   Body mass index is 32.45 kg/m .    GENERAL: healthy, alert and no distress  NECK: no adenopathy, no asymmetry, masses, or scars and thyroid normal to palpation  RESP: lungs clear to auscultation - no rales, rhonchi or wheezes  CV: regular rate and rhythm, normal S1 S2, no S3 or S4, no murmur, click or rub, no peripheral edema and peripheral pulses strong  ABDOMEN: soft, nontender, no hepatosplenomegaly, no masses and bowel sounds normal  MS: no gross musculoskeletal defects noted, no edema  SKIN: no suspicious lesions or rashes  NEURO: Normal strength and tone, mentation intact and speech normal  PSYCH: mentation appears normal, affect normal/bright    ASSESSMENT/PLAN:       ICD-10-CM    1. Supervision of high risk pregnancy in first trimester  O09.91 Urine Culture Aerobic Bacterial     *UA reflex to Microscopic     Urine Microscopic Exam     INFLUENZA VACCINE IM > 6 MONTHS VALENT IIV4 (AFLURIA/FLUZONE)      2. Need for prophylactic vaccination and inoculation against influenza  Z23 INFLUENZA VACCINE IM > 6 MONTHS VALENT IIV4 (AFLURIA/FLUZONE)      3. 7 weeks gestation of pregnancy  Z3A.01       4. BMI 30.0-30.9,adult  Z68.30           32 year old , On 2022 patient is 7w5d weeks of pregnancy with MITESH of 2023, by Last Menstrual  Period     consult for US for AMA patients: NA  Genetic Testing reviewed and discussed, patient desires [facility]. Handout provided, also discussed if positive will need referral to M, patient and partner will also review handout on carrier screening and decide before next visit    -81mg aspirin started if 1 or more high risk factors or 2 or more moderate risk factors for preeclampsia present  Moderate risk factors: nulliparity, BMI >30    COUNSELING    Instructed on use of triage nurse line and contacting the on call after hours in an emergency.     Symptoms of N&V and fatigue usually start to resolve around 12-16 weeks     Discussed appointment spacing    Reviewed exercise and nutrition, recommend she continue exercising and focus on healthy, well balanced diet    Recommend to gain 15-20 pounds with her pregnancy.    Discussed OTC medications. OB med list given    Encouraged patient to take PNV's/DHA    Travel precautions discussed, no air travel after 36 weeks and COVID recommendations discussed    Flu vaccine given today    Reviewed US from 2019, recommend she watch closely and let us know if changes occur    81mg aspirin 1 x day at bedtime to be started at 12-16 weeks; discussed her criteria BMI 30 and nulliparity, patient is on board with taking    Will draw all labs with genetic screening at  visit      F/U to be addressed next visit: Offer Bivlaent booster, NEEDs consent signed for genetic testing    Will return to the clinic in 3 weeks for her next routine prenatal check.  Will call to be seen sooner if problems arise.    JOHN Oconnell CNM

## 2022-10-18 ENCOUNTER — TELEPHONE (OUTPATIENT)
Dept: OBGYN | Facility: CLINIC | Age: 32
End: 2022-10-18

## 2022-10-18 RX ORDER — NITROFURANTOIN 25; 75 MG/1; MG/1
100 CAPSULE ORAL 2 TIMES DAILY
Qty: 14 CAPSULE | Refills: 0 | Status: SHIPPED | OUTPATIENT
Start: 2022-10-18 | End: 2022-10-25

## 2022-10-18 NOTE — TELEPHONE ENCOUNTER
Called patient to update of Abx being sent in.  Patient verbalized understanding and will start treatment at this time.  Fili Meeks RN on 10/18/2022 at 2:24 PM

## 2022-10-18 NOTE — TELEPHONE ENCOUNTER
The urine culture is still in process but we can send macrobid to her pharmacy. 100 mg BID X 7 days

## 2022-10-18 NOTE — TELEPHONE ENCOUNTER
Patient called because she has questions about her last test result, said she sent a [employer] message this morning about this. Please call back.

## 2022-10-18 NOTE — TELEPHONE ENCOUNTER
7w6d today, seen for 1st OB yesterday annette Vera CNM  UA results in chart and UCx still pending.    Pt reporting sx via mychart- routing to Dr Go as the intending provider - should we treat sx and UA results?    Kanchan Garcia, RN on 10/18/2022 at 10:10 AM

## 2022-10-18 NOTE — TELEPHONE ENCOUNTER
Shima Go MD  to [facility] Triage    ME     1:15 PM  Note  The urine culture is still in process but we can send macrobid to her pharmacy. 100 mg BID X 7 days        Pt has been informed through [employer] message encounter.    Kanchan Garcia RN on 10/18/2022 at 2:22 PM

## 2022-10-19 LAB — BACTERIA UR CULT: NO GROWTH

## 2022-11-01 ENCOUNTER — TELEPHONE (OUTPATIENT)
Dept: OBGYN | Facility: CLINIC | Age: 32
End: 2022-11-01

## 2022-11-01 NOTE — TELEPHONE ENCOUNTER
9w6d    For last 2 days has had low, middle back pain and shoots to the side sometimes - missed work  Hurts to bend, cough - shooting pain  Feels it more if she moves or bends; if she is still doesn't feel it as much.  Has tried Tylenol 650mg and heat and icy hot    No vaginal symptoms of LOF or bleeding. Pain not constant and no cramping.    Reassured sounds more musculoskeletal like maybe she did some lifting or a position change that initiated this. Trying ES Tylenol 1000mg every 6 hrs and trying ICE to back. Be seen if more constant, cramping or bleeding - in ER. Offered to see if she could be seen earlier than her scheduled visit next  and she said she will monitor and call if things change/worsen.    Pt verbalized understanding, in agreement with plan, and voiced no further questions.  Kanchan Garcia RN on 2022 at 9:15 AM

## 2022-11-01 NOTE — TELEPHONE ENCOUNTER
Patient is 10 weeks pregnant and is having lower back pain. Next appt is 11/8  Please call pt back today

## 2022-11-07 LAB
ABO/RH(D): NORMAL
ANTIBODY SCREEN: NEGATIVE
SPECIMEN EXPIRATION DATE: NORMAL

## 2022-11-08 ENCOUNTER — PRENATAL OFFICE VISIT (OUTPATIENT)
Dept: OBGYN | Facility: CLINIC | Age: 32
End: 2022-11-08
Payer: COMMERCIAL

## 2022-11-08 VITALS — DIASTOLIC BLOOD PRESSURE: 78 MMHG | WEIGHT: 198 LBS | BODY MASS INDEX: 32.95 KG/M2 | SYSTOLIC BLOOD PRESSURE: 114 MMHG

## 2022-11-08 DIAGNOSIS — Z13.79 GENETIC SCREENING: ICD-10-CM

## 2022-11-08 DIAGNOSIS — Z34.01 ENCOUNTER FOR SUPERVISION OF NORMAL FIRST PREGNANCY IN FIRST TRIMESTER: ICD-10-CM

## 2022-11-08 DIAGNOSIS — O09.91 SUPERVISION OF HIGH RISK PREGNANCY IN FIRST TRIMESTER: Primary | ICD-10-CM

## 2022-11-08 LAB
ERYTHROCYTE [DISTWIDTH] IN BLOOD BY AUTOMATED COUNT: 15.8 % (ref 10–15)
HCT VFR BLD AUTO: 40.4 % (ref 35–47)
HGB BLD-MCNC: 13.1 G/DL (ref 11.7–15.7)
MCH RBC QN AUTO: 26 PG (ref 26.5–33)
MCHC RBC AUTO-ENTMCNC: 32.4 G/DL (ref 31.5–36.5)
MCV RBC AUTO: 80 FL (ref 78–100)
PLATELET # BLD AUTO: 310 10E3/UL (ref 150–450)
RBC # BLD AUTO: 5.04 10E6/UL (ref 3.8–5.2)
WBC # BLD AUTO: 13.9 10E3/UL (ref 4–11)

## 2022-11-08 PROCEDURE — 86780 TREPONEMA PALLIDUM: CPT | Performed by: OBSTETRICS & GYNECOLOGY

## 2022-11-08 PROCEDURE — 86901 BLOOD TYPING SEROLOGIC RH(D): CPT | Performed by: OBSTETRICS & GYNECOLOGY

## 2022-11-08 PROCEDURE — 99207 PR PRENATAL VISIT: CPT | Performed by: OBSTETRICS & GYNECOLOGY

## 2022-11-08 PROCEDURE — 86850 RBC ANTIBODY SCREEN: CPT | Performed by: OBSTETRICS & GYNECOLOGY

## 2022-11-08 PROCEDURE — 86762 RUBELLA ANTIBODY: CPT | Performed by: OBSTETRICS & GYNECOLOGY

## 2022-11-08 PROCEDURE — 85027 COMPLETE CBC AUTOMATED: CPT | Performed by: OBSTETRICS & GYNECOLOGY

## 2022-11-08 PROCEDURE — 86900 BLOOD TYPING SEROLOGIC ABO: CPT | Performed by: OBSTETRICS & GYNECOLOGY

## 2022-11-08 PROCEDURE — 36415 COLL VENOUS BLD VENIPUNCTURE: CPT | Performed by: OBSTETRICS & GYNECOLOGY

## 2022-11-08 PROCEDURE — 87389 HIV-1 AG W/HIV-1&-2 AB AG IA: CPT | Performed by: OBSTETRICS & GYNECOLOGY

## 2022-11-08 PROCEDURE — 87340 HEPATITIS B SURFACE AG IA: CPT | Performed by: OBSTETRICS & GYNECOLOGY

## 2022-11-08 PROCEDURE — 86803 HEPATITIS C AB TEST: CPT | Performed by: OBSTETRICS & GYNECOLOGY

## 2022-11-08 NOTE — PROGRESS NOTES
Prenatal Visit: doing well overall and finds that a little scary. Doptones normal. Planning on NIPT today as well. Rakel gave disturbing feedback about her treatment in this clinic. She voiced disappointment and frustration in being given challenges to appointment requests and not being treated warmly by multiple levels of staff. I will follow up with the clinical leadership to give feedback and offer areas of improvement.  Repeat visit in 4 weeks.  Shima Go MD

## 2022-11-09 LAB
ABO/RH TYPE: NORMAL
HBV SURFACE AG SERPL QL IA: NONREACTIVE
HCV AB SERPL QL IA: NONREACTIVE
HIV 1+2 AB+HIV1 P24 AG SERPL QL IA: NONREACTIVE
SPECIMEN EXPIRATION DATE: NORMAL
T PALLIDUM AB SER QL: NONREACTIVE

## 2022-11-10 LAB
RUBV IGG SERPL QL IA: 2.69 INDEX
RUBV IGG SERPL QL IA: POSITIVE

## 2022-11-14 LAB — SCANNED LAB RESULT: NORMAL

## 2022-12-08 ENCOUNTER — PRENATAL OFFICE VISIT (OUTPATIENT)
Dept: OBGYN | Facility: CLINIC | Age: 32
End: 2022-12-08
Payer: COMMERCIAL

## 2022-12-08 VITALS — BODY MASS INDEX: 33.95 KG/M2 | DIASTOLIC BLOOD PRESSURE: 60 MMHG | SYSTOLIC BLOOD PRESSURE: 114 MMHG | WEIGHT: 204 LBS

## 2022-12-08 DIAGNOSIS — Z36.1 NEED FOR MATERNAL SERUM ALPHA-PROTEIN (MSAFP) SCREENING: ICD-10-CM

## 2022-12-08 DIAGNOSIS — O09.92 SUPERVISION OF HIGH RISK PREGNANCY IN SECOND TRIMESTER: Primary | ICD-10-CM

## 2022-12-08 PROCEDURE — 99207 PR PRENATAL VISIT: CPT | Performed by: OBSTETRICS & GYNECOLOGY

## 2022-12-08 PROCEDURE — 99000 SPECIMEN HANDLING OFFICE-LAB: CPT | Performed by: OBSTETRICS & GYNECOLOGY

## 2022-12-08 PROCEDURE — 82105 ALPHA-FETOPROTEIN SERUM: CPT | Mod: 90 | Performed by: OBSTETRICS & GYNECOLOGY

## 2022-12-08 PROCEDURE — 36415 COLL VENOUS BLD VENIPUNCTURE: CPT | Performed by: OBSTETRICS & GYNECOLOGY

## 2022-12-08 NOTE — PROGRESS NOTES
Prenatal Visit: Doing better. Having fetal movement already! No other concerns today. Anatomical survey ordered. AFP planned for today. Letter provided to allow for remote work with inclement weather or with pregnancy symptoms.  RTC in 4 weeks.  Shima Go MD

## 2022-12-08 NOTE — LETTER
December 8, 2022      Rakel Shaw  [address] [facility] [address]        To Whom It May Concern:    Rakel Shaw was seen in our clinic for ongoing prenatal care. Her estimated due date is 05/31/2023. I am requesting the ability to work from home with inclement weather and also with any pregnancy related symptoms. Please reach out to me if further clarification is needed.      Sincerely,        Shima Go MD

## 2022-12-11 LAB
# FETUSES US: NORMAL
AFP MOM SERPL: 1.35
AFP SERPL-MCNC: 36 NG/ML
AGE - REPORTED: 31.9 YR
CURRENT SMOKER: NO
FAMILY MEMBER DISEASES HX: NO
GA METHOD: NORMAL
GA: NORMAL WK
IDDM PATIENT QL: NO
INTEGRATED SCN PATIENT-IMP: NORMAL
SPECIMEN DRAWN SERPL: NORMAL

## 2022-12-26 ENCOUNTER — HEALTH MAINTENANCE LETTER (OUTPATIENT)
Age: 32
End: 2022-12-26

## 2023-01-09 ENCOUNTER — ANCILLARY PROCEDURE (OUTPATIENT)
Dept: ULTRASOUND IMAGING | Facility: CLINIC | Age: 33
End: 2023-01-09
Payer: COMMERCIAL

## 2023-01-09 ENCOUNTER — PRENATAL OFFICE VISIT (OUTPATIENT)
Dept: OBGYN | Facility: CLINIC | Age: 33
End: 2023-01-09
Payer: COMMERCIAL

## 2023-01-09 VITALS — BODY MASS INDEX: 35.45 KG/M2 | SYSTOLIC BLOOD PRESSURE: 110 MMHG | WEIGHT: 213 LBS | DIASTOLIC BLOOD PRESSURE: 70 MMHG

## 2023-01-09 DIAGNOSIS — O09.92 SUPERVISION OF HIGH RISK PREGNANCY IN SECOND TRIMESTER: ICD-10-CM

## 2023-01-09 DIAGNOSIS — O09.92 SUPERVISION OF HIGH RISK PREGNANCY IN SECOND TRIMESTER: Primary | ICD-10-CM

## 2023-01-09 LAB — HGB BLD-MCNC: 12.4 G/DL (ref 11.7–15.7)

## 2023-01-09 PROCEDURE — 76805 OB US >/= 14 WKS SNGL FETUS: CPT | Performed by: OBSTETRICS & GYNECOLOGY

## 2023-01-09 PROCEDURE — 80048 BASIC METABOLIC PNL TOTAL CA: CPT | Performed by: OBSTETRICS & GYNECOLOGY

## 2023-01-09 PROCEDURE — 82306 VITAMIN D 25 HYDROXY: CPT | Performed by: OBSTETRICS & GYNECOLOGY

## 2023-01-09 PROCEDURE — 85018 HEMOGLOBIN: CPT | Performed by: OBSTETRICS & GYNECOLOGY

## 2023-01-09 PROCEDURE — 99207 PR PRENATAL VISIT: CPT | Performed by: OBSTETRICS & GYNECOLOGY

## 2023-01-09 PROCEDURE — 82728 ASSAY OF FERRITIN: CPT | Performed by: OBSTETRICS & GYNECOLOGY

## 2023-01-09 PROCEDURE — 36415 COLL VENOUS BLD VENIPUNCTURE: CPT | Performed by: OBSTETRICS & GYNECOLOGY

## 2023-01-09 NOTE — PROGRESS NOTES
Normal fetal anatomical survey today. Rakel has been having joint pain and whole body discomfort. She is not taking a daily vitamin D or oral iron supplement. Will recheck levels today. Rheumatism type of symptoms can be common in pregnancy. Will follow up. Rakel prefers weekly vitamin D if she has to be supplemented.  Shima Go MD

## 2023-01-10 LAB
ANION GAP SERPL CALCULATED.3IONS-SCNC: 14 MMOL/L (ref 7–15)
BUN SERPL-MCNC: 7.9 MG/DL (ref 6–20)
CALCIUM SERPL-MCNC: 9.6 MG/DL (ref 8.6–10)
CHLORIDE SERPL-SCNC: 102 MMOL/L (ref 98–107)
CREAT SERPL-MCNC: 0.63 MG/DL (ref 0.51–0.95)
DEPRECATED CALCIDIOL+CALCIFEROL SERPL-MC: 37 UG/L (ref 20–75)
DEPRECATED HCO3 PLAS-SCNC: 20 MMOL/L (ref 22–29)
FERRITIN SERPL-MCNC: 37 NG/ML (ref 6–175)
GFR SERPL CREATININE-BSD FRML MDRD: >90 ML/MIN/1.73M2
GLUCOSE SERPL-MCNC: 79 MG/DL (ref 70–99)
POTASSIUM SERPL-SCNC: 4.3 MMOL/L (ref 3.4–5.3)
SODIUM SERPL-SCNC: 136 MMOL/L (ref 136–145)

## 2023-02-09 ENCOUNTER — PRENATAL OFFICE VISIT (OUTPATIENT)
Dept: OBGYN | Facility: CLINIC | Age: 33
End: 2023-02-09
Payer: COMMERCIAL

## 2023-02-09 VITALS — SYSTOLIC BLOOD PRESSURE: 108 MMHG | WEIGHT: 220 LBS | DIASTOLIC BLOOD PRESSURE: 64 MMHG | BODY MASS INDEX: 36.61 KG/M2

## 2023-02-09 DIAGNOSIS — O09.92 SUPERVISION OF HIGH RISK PREGNANCY IN SECOND TRIMESTER: Primary | ICD-10-CM

## 2023-02-09 DIAGNOSIS — R35.0 URINARY FREQUENCY: ICD-10-CM

## 2023-02-09 PROCEDURE — 99207 PR PRENATAL VISIT: CPT | Performed by: OBSTETRICS & GYNECOLOGY

## 2023-02-09 NOTE — LETTER
To Whom It May Concern    Rakel Shaw is under my care for her pregnancy. She is currently 24 weeks pregnant with an estimated due date of May 31st  2023. She is medically cleared for air travel.    Sincerely,    Dr. Shima Go

## 2023-02-09 NOTE — PROGRESS NOTES
Prenatal Visit: Doing ok but continuing to have MSK pain. Travel letter given. Discussed seeking out Pediatricians, Breast Pumps, New born sleep and PP support. Will continue to address. Discussed the glucola at her next visit    RTC in 4 weeks.  Shima Go MD

## 2023-02-15 ENCOUNTER — MYC MEDICAL ADVICE (OUTPATIENT)
Dept: OBGYN | Facility: CLINIC | Age: 33
End: 2023-02-15
Payer: COMMERCIAL

## 2023-02-15 NOTE — TELEPHONE ENCOUNTER
Patient wondering when growth scan will be done?  Hx: Rhabdomyolysis     1/9/23: 20wk US  Impression: Walker intrauterine pregnancy with normal interval fetal growth. Normal fetal anatomical survey today. Normal cervical length. Normal placental location. Normal amniotic fluid level  Shima Go MD    Routing to MD to advise  Fili Meeks RN on 2/15/2023 at 2:44 PM

## 2023-02-20 ENCOUNTER — HOSPITAL ENCOUNTER (OUTPATIENT)
Facility: CLINIC | Age: 33
End: 2023-02-20
Admitting: OBSTETRICS & GYNECOLOGY
Payer: COMMERCIAL

## 2023-02-20 ENCOUNTER — HOSPITAL ENCOUNTER (OUTPATIENT)
Facility: CLINIC | Age: 33
Discharge: HOME | End: 2023-02-20
Attending: STUDENT IN AN ORGANIZED HEALTH CARE EDUCATION/TRAINING PROGRAM | Admitting: OBSTETRICS & GYNECOLOGY
Payer: COMMERCIAL

## 2023-02-20 VITALS
SYSTOLIC BLOOD PRESSURE: 125 MMHG | RESPIRATION RATE: 16 BRPM | HEART RATE: 107 BPM | BODY MASS INDEX: 34.53 KG/M2 | TEMPERATURE: 98.3 F | DIASTOLIC BLOOD PRESSURE: 64 MMHG | WEIGHT: 220 LBS | HEIGHT: 67 IN

## 2023-02-20 LAB
ALBUMIN UR-MCNC: 50 MG/DL
APPEARANCE UR: CLEAR
BILIRUB UR QL STRIP: NEGATIVE
COLOR UR AUTO: YELLOW
GLUCOSE UR STRIP-MCNC: NEGATIVE MG/DL
HGB UR QL STRIP: ABNORMAL
KETONES UR STRIP-MCNC: ABNORMAL MG/DL
LEUKOCYTE ESTERASE UR QL STRIP: NEGATIVE
MUCOUS THREADS #/AREA URNS LPF: PRESENT /LPF
NITRATE UR QL: NEGATIVE
PH UR STRIP: 5.5 [PH] (ref 5–7)
RBC URINE: 7 /HPF
SP GR UR STRIP: 1.03 (ref 1–1.03)
SQUAMOUS EPITHELIAL: 1 /HPF
UROBILINOGEN UR STRIP-MCNC: NORMAL MG/DL
WBC URINE: 6 /HPF

## 2023-02-20 PROCEDURE — G0463 HOSPITAL OUTPT CLINIC VISIT: HCPCS

## 2023-02-20 PROCEDURE — 81003 URINALYSIS AUTO W/O SCOPE: CPT | Performed by: OBSTETRICS & GYNECOLOGY

## 2023-02-20 RX ORDER — METOCLOPRAMIDE HYDROCHLORIDE 5 MG/ML
10 INJECTION INTRAMUSCULAR; INTRAVENOUS EVERY 6 HOURS PRN
Status: DISCONTINUED | OUTPATIENT
Start: 2023-02-20 | End: 2023-02-20 | Stop reason: HOSPADM

## 2023-02-20 RX ORDER — ONDANSETRON 2 MG/ML
4 INJECTION INTRAMUSCULAR; INTRAVENOUS EVERY 6 HOURS PRN
Status: DISCONTINUED | OUTPATIENT
Start: 2023-02-20 | End: 2023-02-20 | Stop reason: HOSPADM

## 2023-02-20 RX ORDER — PROCHLORPERAZINE 25 MG
25 SUPPOSITORY, RECTAL RECTAL EVERY 12 HOURS PRN
Status: DISCONTINUED | OUTPATIENT
Start: 2023-02-20 | End: 2023-02-20 | Stop reason: HOSPADM

## 2023-02-20 RX ORDER — PROCHLORPERAZINE MALEATE 5 MG
10 TABLET ORAL EVERY 6 HOURS PRN
Status: DISCONTINUED | OUTPATIENT
Start: 2023-02-20 | End: 2023-02-20 | Stop reason: HOSPADM

## 2023-02-20 RX ORDER — METOCLOPRAMIDE 10 MG/1
10 TABLET ORAL EVERY 6 HOURS PRN
Status: DISCONTINUED | OUTPATIENT
Start: 2023-02-20 | End: 2023-02-20 | Stop reason: HOSPADM

## 2023-02-20 RX ORDER — ONDANSETRON 4 MG/1
4 TABLET, ORALLY DISINTEGRATING ORAL EVERY 6 HOURS PRN
Status: DISCONTINUED | OUTPATIENT
Start: 2023-02-20 | End: 2023-02-20 | Stop reason: HOSPADM

## 2023-02-20 ASSESSMENT — ACTIVITIES OF DAILY LIVING (ADL): ADLS_ACUITY_SCORE: 33

## 2023-02-20 NOTE — PLAN OF CARE
" 25 5/7 wks Pt of Dr Go presents to MAC c/o decreased fetal movement pelvic pressure/discomfort and urinary frequency. US and TOCO applied with pt consent. Pt reports feeling baby move, it's \"just less than usual\". . Pt denies any fluid loss or vaginal bleeding.   Pt states she has had pelvic pain since around 8 weeks gestation, but it's worse today.   1708- Dr Agrawal updated on pt and fetal status. UC ordered. FHTs 150, moderate variability, accels present, no decels. Pt not gilda. Pt may go home and follow up in clinic per Dr Agrawal.   1725- Discharge instructions reviewed with pt and spouse. All questions answered, pt and spouse agree with plan to go home.   "

## 2023-02-20 NOTE — TELEPHONE ENCOUNTER
Returned patient call  25w5d  Strength of noted fetal movement is very mild and in her lower pelvic region. Much different than what she was normally feeling.  Also noting some increased pelvic pressure when she is up moving around with increased urinary frequency.  Denies any abdominal pain, abnormal vaginal discharge, LOF.  Patient to proceed to the MAC at this time,  MAC and Dr Parker updated.   Detail Level: Detailed

## 2023-02-20 NOTE — TELEPHONE ENCOUNTER
25w5d  [employer] message sent with worsening carpel tunnel symptoms.  Also noting DFM per patient. NOtes + movement, just not as much as normal.  Posterior placenta noted.  LMTCB--instructions also sent via [employer].  Fili Meeks RN on 2/20/2023 at 11:33 AM

## 2023-02-20 NOTE — DISCHARGE INSTRUCTIONS
Discharge Instruction for Undelivered Patients      You were seen for: Decreased fetal movement, pelvic pain and pressure.   We Consulted: Dr Agrawal.  You had (Test or Medicine): Fetal and uterine monitoring. No contractions seen on monitor.      Diet:   As tolerated.     Activity:  As tolerated.     Call your provider if you notice:  Swelling in your face or increased swelling in your hands or legs.  Headaches that are not relieved by Tylenol (acetaminophen).  Changes in your vision (blurring: seeing spots or stars.)  Nausea (sick to your stomach) and vomiting (throwing up).   Weight gain of 5 pounds or more per week.  Heartburn that doesn't go away.  Signs of bladder infection: pain when you urinate (use the toilet), need to go more often and more urgently.  The bag of robertson (rupture of membranes) breaks, or you notice leaking in your underwear.  Bright red blood in your underwear.  Abdominal (lower belly) or stomach pain.  For first baby: Contractions (tightening) less than 5 minutes apart for one hour or more.  *If less than 34 weeks: Contractions (tightening) more than 6 times in one hour.  Increase or change in vaginal discharge (note the color and amount)  Other: Any concerns or questions, call [facility] @ [phone].    Follow-up: Next week in clinic at [facility].

## 2023-02-21 RX ORDER — NITROFURANTOIN 25; 75 MG/1; MG/1
100 CAPSULE ORAL 2 TIMES DAILY
Qty: 14 CAPSULE | Refills: 0 | Status: SHIPPED | OUTPATIENT
Start: 2023-02-21 | End: 2023-03-23

## 2023-03-01 DIAGNOSIS — Z36.9 ENCOUNTER FOR ANTENATAL SCREENING OF MOTHER: Primary | ICD-10-CM

## 2023-03-01 DIAGNOSIS — Z23 NEED FOR TDAP VACCINATION: ICD-10-CM

## 2023-03-09 ENCOUNTER — LAB (OUTPATIENT)
Dept: LAB | Facility: CLINIC | Age: 33
End: 2023-03-09
Payer: COMMERCIAL

## 2023-03-09 ENCOUNTER — PRENATAL OFFICE VISIT (OUTPATIENT)
Dept: OBGYN | Facility: CLINIC | Age: 33
End: 2023-03-09
Payer: COMMERCIAL

## 2023-03-09 VITALS
WEIGHT: 228 LBS | HEIGHT: 67 IN | BODY MASS INDEX: 35.79 KG/M2 | DIASTOLIC BLOOD PRESSURE: 68 MMHG | SYSTOLIC BLOOD PRESSURE: 110 MMHG

## 2023-03-09 DIAGNOSIS — O12.03 EDEMA DURING PREGNANCY IN THIRD TRIMESTER: ICD-10-CM

## 2023-03-09 DIAGNOSIS — O09.91 SUPERVISION OF HIGH RISK PREGNANCY IN FIRST TRIMESTER: ICD-10-CM

## 2023-03-09 DIAGNOSIS — O09.92 SUPERVISION OF HIGH RISK PREGNANCY IN SECOND TRIMESTER: Primary | ICD-10-CM

## 2023-03-09 DIAGNOSIS — Z23 NEED FOR TDAP VACCINATION: ICD-10-CM

## 2023-03-09 DIAGNOSIS — Z36.9 ENCOUNTER FOR ANTENATAL SCREENING OF MOTHER: ICD-10-CM

## 2023-03-09 DIAGNOSIS — R35.0 URINARY FREQUENCY: ICD-10-CM

## 2023-03-09 LAB
ALBUMIN UR-MCNC: NEGATIVE MG/DL
APPEARANCE UR: CLEAR
BACTERIA #/AREA URNS HPF: ABNORMAL /HPF
BILIRUB UR QL STRIP: NEGATIVE
COLOR UR AUTO: YELLOW
ERYTHROCYTE [DISTWIDTH] IN BLOOD BY AUTOMATED COUNT: 13.6 % (ref 10–15)
GLUCOSE 1H P 50 G GLC PO SERPL-MCNC: 106 MG/DL (ref 70–129)
GLUCOSE UR STRIP-MCNC: NEGATIVE MG/DL
HCT VFR BLD AUTO: 34.4 % (ref 35–47)
HGB BLD-MCNC: 11.5 G/DL (ref 11.7–15.7)
HGB UR QL STRIP: ABNORMAL
KETONES UR STRIP-MCNC: NEGATIVE MG/DL
LEUKOCYTE ESTERASE UR QL STRIP: ABNORMAL
MCH RBC QN AUTO: 28.3 PG (ref 26.5–33)
MCHC RBC AUTO-ENTMCNC: 33.4 G/DL (ref 31.5–36.5)
MCV RBC AUTO: 85 FL (ref 78–100)
NITRATE UR QL: NEGATIVE
PH UR STRIP: 7.5 [PH] (ref 5–7)
PLATELET # BLD AUTO: 305 10E3/UL (ref 150–450)
RBC # BLD AUTO: 4.06 10E6/UL (ref 3.8–5.2)
RBC #/AREA URNS AUTO: ABNORMAL /HPF
SP GR UR STRIP: 1.01 (ref 1–1.03)
SQUAMOUS #/AREA URNS AUTO: ABNORMAL /LPF
UROBILINOGEN UR STRIP-ACNC: 0.2 E.U./DL
WBC # BLD AUTO: 11.4 10E3/UL (ref 4–11)
WBC #/AREA URNS AUTO: ABNORMAL /HPF

## 2023-03-09 PROCEDURE — 82950 GLUCOSE TEST: CPT

## 2023-03-09 PROCEDURE — 99207 PR PRENATAL VISIT: CPT | Performed by: OBSTETRICS & GYNECOLOGY

## 2023-03-09 PROCEDURE — 85027 COMPLETE CBC AUTOMATED: CPT

## 2023-03-09 PROCEDURE — 87086 URINE CULTURE/COLONY COUNT: CPT | Performed by: OBSTETRICS & GYNECOLOGY

## 2023-03-09 PROCEDURE — 84156 ASSAY OF PROTEIN URINE: CPT | Performed by: OBSTETRICS & GYNECOLOGY

## 2023-03-09 PROCEDURE — 81001 URINALYSIS AUTO W/SCOPE: CPT | Performed by: OBSTETRICS & GYNECOLOGY

## 2023-03-09 PROCEDURE — 80053 COMPREHEN METABOLIC PANEL: CPT

## 2023-03-09 PROCEDURE — 90471 IMMUNIZATION ADMIN: CPT | Performed by: OBSTETRICS & GYNECOLOGY

## 2023-03-09 PROCEDURE — 36415 COLL VENOUS BLD VENIPUNCTURE: CPT

## 2023-03-09 PROCEDURE — 90715 TDAP VACCINE 7 YRS/> IM: CPT | Performed by: OBSTETRICS & GYNECOLOGY

## 2023-03-09 NOTE — PROGRESS NOTES
Prenatal Visit: Noticing more facial edema than prior. She has also had carpal tunnel symptoms with hand swelling and bilateral lower extremity swelling. She is checking her blood pressure at home and it has been normal. Her blood pressure here is normal as well.  She was given a macrobid prescription and states that her symptoms have not completely resolved. Will repeat her urine studies today and send a urine culture.  Regarding the swelling will send baseline preeclamptic labs and plan on continued close monitoring of her blood pressure.  RTC in 2 weeks  Shima Go MD

## 2023-03-10 ENCOUNTER — MYC MEDICAL ADVICE (OUTPATIENT)
Dept: OBGYN | Facility: CLINIC | Age: 33
End: 2023-03-10
Payer: COMMERCIAL

## 2023-03-10 DIAGNOSIS — R35.0 URINARY FREQUENCY: Primary | ICD-10-CM

## 2023-03-10 LAB
ALBUMIN MFR UR ELPH: 15.2 MG/DL (ref 1–14)
ALBUMIN SERPL BCG-MCNC: 3.3 G/DL (ref 3.5–5.2)
ALP SERPL-CCNC: 61 U/L (ref 35–104)
ALT SERPL W P-5'-P-CCNC: 10 U/L (ref 10–35)
ANION GAP SERPL CALCULATED.3IONS-SCNC: 13 MMOL/L (ref 7–15)
AST SERPL W P-5'-P-CCNC: 20 U/L (ref 10–35)
BILIRUB SERPL-MCNC: 0.2 MG/DL
BUN SERPL-MCNC: 7.3 MG/DL (ref 6–20)
CALCIUM SERPL-MCNC: 8.9 MG/DL (ref 8.6–10)
CHLORIDE SERPL-SCNC: 105 MMOL/L (ref 98–107)
CREAT SERPL-MCNC: 0.65 MG/DL (ref 0.51–0.95)
CREAT UR-MCNC: 70.4 MG/DL
DEPRECATED HCO3 PLAS-SCNC: 21 MMOL/L (ref 22–29)
GFR SERPL CREATININE-BSD FRML MDRD: >90 ML/MIN/1.73M2
GLUCOSE SERPL-MCNC: 119 MG/DL (ref 70–99)
POTASSIUM SERPL-SCNC: 3.9 MMOL/L (ref 3.4–5.3)
PROT SERPL-MCNC: 6.1 G/DL (ref 6.4–8.3)
PROT/CREAT 24H UR: 0.22 MG/MG CR (ref 0–0.2)
SODIUM SERPL-SCNC: 139 MMOL/L (ref 136–145)

## 2023-03-10 RX ORDER — NITROFURANTOIN 25; 75 MG/1; MG/1
100 CAPSULE ORAL 2 TIMES DAILY
Qty: 14 CAPSULE | Refills: 0 | Status: SHIPPED | OUTPATIENT
Start: 2023-03-10 | End: 2023-03-17

## 2023-03-11 LAB — BACTERIA UR CULT: NO GROWTH

## 2023-03-15 ENCOUNTER — MEDICAL CORRESPONDENCE (OUTPATIENT)
Dept: HEALTH INFORMATION MANAGEMENT | Facility: CLINIC | Age: 33
End: 2023-03-15
Payer: COMMERCIAL

## 2023-03-23 ENCOUNTER — PRENATAL OFFICE VISIT (OUTPATIENT)
Dept: OBGYN | Facility: CLINIC | Age: 33
End: 2023-03-23
Payer: COMMERCIAL

## 2023-03-23 ENCOUNTER — ANCILLARY PROCEDURE (OUTPATIENT)
Dept: ULTRASOUND IMAGING | Facility: CLINIC | Age: 33
End: 2023-03-23
Attending: OBSTETRICS & GYNECOLOGY
Payer: COMMERCIAL

## 2023-03-23 VITALS
WEIGHT: 225 LBS | BODY MASS INDEX: 35.31 KG/M2 | SYSTOLIC BLOOD PRESSURE: 108 MMHG | DIASTOLIC BLOOD PRESSURE: 60 MMHG | HEIGHT: 67 IN

## 2023-03-23 DIAGNOSIS — O09.92 SUPERVISION OF HIGH RISK PREGNANCY IN SECOND TRIMESTER: Primary | ICD-10-CM

## 2023-03-23 DIAGNOSIS — O09.92 SUPERVISION OF HIGH RISK PREGNANCY IN SECOND TRIMESTER: ICD-10-CM

## 2023-03-23 PROCEDURE — 76816 OB US FOLLOW-UP PER FETUS: CPT | Performed by: OBSTETRICS & GYNECOLOGY

## 2023-03-23 PROCEDURE — 99207 PR PRENATAL VISIT: CPT | Performed by: OBSTETRICS & GYNECOLOGY

## 2023-03-23 NOTE — PROGRESS NOTES
Prenatal Visit: doing well overall. Normal growth today. Discussed prenatal classes including sleep courses as well. Urinary symptoms have improved. Will repeat P/C ratio today. Normal blood pressures noted today.  RTC in 2 weeks.  Shima Go MD

## 2023-04-04 ENCOUNTER — MYC MEDICAL ADVICE (OUTPATIENT)
Dept: OBGYN | Facility: CLINIC | Age: 33
End: 2023-04-04
Payer: COMMERCIAL

## 2023-04-06 ENCOUNTER — PRENATAL OFFICE VISIT (OUTPATIENT)
Dept: OBGYN | Facility: CLINIC | Age: 33
End: 2023-04-06
Payer: COMMERCIAL

## 2023-04-06 VITALS
SYSTOLIC BLOOD PRESSURE: 110 MMHG | DIASTOLIC BLOOD PRESSURE: 66 MMHG | WEIGHT: 228.4 LBS | BODY MASS INDEX: 35.85 KG/M2 | HEIGHT: 67 IN

## 2023-04-06 DIAGNOSIS — O09.92 SUPERVISION OF HIGH RISK PREGNANCY IN SECOND TRIMESTER: Primary | ICD-10-CM

## 2023-04-06 PROCEDURE — 99207 PR PRENATAL VISIT: CPT | Performed by: OBSTETRICS & GYNECOLOGY

## 2023-04-06 PROCEDURE — 84156 ASSAY OF PROTEIN URINE: CPT | Performed by: OBSTETRICS & GYNECOLOGY

## 2023-04-06 NOTE — PROGRESS NOTES
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 GERD has improved since starting oral prilosec. Has good fetal movement. Discussed ways of minimizing anxiety about labor outcomes. Repeat Protein/creatinine ordered today.  RTC in 2 weeks.  Shima Go MD

## 2023-04-07 LAB
ALBUMIN MFR UR ELPH: 44.7 MG/DL (ref 1–14)
CREAT UR-MCNC: 261.6 MG/DL
PROT/CREAT 24H UR: 0.17 MG/MG CR (ref 0–0.2)

## 2023-04-08 ENCOUNTER — MYC MEDICAL ADVICE (OUTPATIENT)
Dept: OBGYN | Facility: CLINIC | Age: 33
End: 2023-04-08
Payer: COMMERCIAL

## 2023-04-08 ENCOUNTER — NURSE TRIAGE (OUTPATIENT)
Dept: NURSING | Facility: CLINIC | Age: 33
End: 2023-04-08
Payer: COMMERCIAL

## 2023-04-08 NOTE — TELEPHONE ENCOUNTER
Nurse Triage SBAR    Is this a 2nd Level Triage? YES, LICENSED PRACTITIONER REVIEW IS REQUIRED    Situation:   Vaginal cyst/boil outer lips of vagina    Background:   Noticed there couple days, getting bigger since yesterday  Currently pregnant, 32 weeks.    Assessment:   tried warm compression, pain level 8, discomforting  Hurts when using bathroom  Denies drainage, itching, bleeding, fever    Protocol Recommended Disposition:   See HCP Within 4 Hours (Or PCP Triage)    Recommendation:   Go to UC?     Paged to provider on call Shima Go, recommends to go UC if symptom is unbearable, or follow up with provider on Monday. Try sitz bath and warm compression to help with symptom.    Does the patient meet one of the following criteria for ADS visit consideration? No      Provider Recommendation Follow Up:   Reached patient/caregiver. Informed of provider's recommendations. Patient verbalized understanding and agrees with the plan. Pt will try warm compressions and warm bath to help.    .    Luda Hussein RN, BSN  4/8/2023 at 2:22 PM  [facility]    Reason for Disposition    [1] SEVERE pain AND [2] not improved 2 hours after pain medicine    Additional Information    Negative: Patient sounds very sick or weak to the triager    Protocols used: VAGINAL SYMPTOMS-A-AH

## 2023-04-20 ENCOUNTER — PRENATAL OFFICE VISIT (OUTPATIENT)
Dept: OBGYN | Facility: CLINIC | Age: 33
End: 2023-04-20
Payer: COMMERCIAL

## 2023-04-20 VITALS
WEIGHT: 234 LBS | SYSTOLIC BLOOD PRESSURE: 118 MMHG | DIASTOLIC BLOOD PRESSURE: 70 MMHG | BODY MASS INDEX: 36.73 KG/M2 | HEIGHT: 67 IN

## 2023-04-20 DIAGNOSIS — O09.92 SUPERVISION OF HIGH RISK PREGNANCY IN SECOND TRIMESTER: Primary | ICD-10-CM

## 2023-04-20 PROCEDURE — 99207 PR PRENATAL VISIT: CPT | Performed by: OBSTETRICS & GYNECOLOGY

## 2023-04-20 NOTE — PROGRESS NOTES
Prenatal Visit: Having more swelling. Facial swelling noted. Taking ASA due to family history. BPs are still in the normal range here and at home. Recommend checking twice a day in the AM and prior to bedtime. Symptoms reviewed. Work letter given to allow remote work from 36 weeks.  RTC in 2 weeks or sooner as needed  Shima Go MD

## 2023-04-20 NOTE — LETTER
April 20, 2023      Rakel Shaw  [address] [address] [facility] [address]        To Whom It May Concern:    Rakel Shaw was seen in our clinic. She may return to work with the following: Please allow Rakel to start working from home/remotely from May 3rd due to her pregnancy.      Sincerely,        Shima Go MD

## 2023-05-04 ENCOUNTER — HOSPITAL ENCOUNTER (OUTPATIENT)
Facility: CLINIC | Age: 33
Discharge: HOME | End: 2023-05-04
Attending: OBSTETRICS & GYNECOLOGY | Admitting: OBSTETRICS & GYNECOLOGY
Payer: COMMERCIAL

## 2023-05-04 ENCOUNTER — MYC MEDICAL ADVICE (OUTPATIENT)
Dept: OBGYN | Facility: CLINIC | Age: 33
End: 2023-05-04

## 2023-05-04 ENCOUNTER — APPOINTMENT (OUTPATIENT)
Dept: ULTRASOUND IMAGING | Facility: CLINIC | Age: 33
End: 2023-05-04
Attending: OBSTETRICS & GYNECOLOGY
Payer: COMMERCIAL

## 2023-05-04 ENCOUNTER — PRENATAL OFFICE VISIT (OUTPATIENT)
Dept: OBGYN | Facility: CLINIC | Age: 33
End: 2023-05-04
Payer: COMMERCIAL

## 2023-05-04 VITALS
TEMPERATURE: 98.4 F | OXYGEN SATURATION: 98 % | RESPIRATION RATE: 16 BRPM | SYSTOLIC BLOOD PRESSURE: 125 MMHG | DIASTOLIC BLOOD PRESSURE: 81 MMHG

## 2023-05-04 VITALS
WEIGHT: 237 LBS | DIASTOLIC BLOOD PRESSURE: 76 MMHG | SYSTOLIC BLOOD PRESSURE: 120 MMHG | HEIGHT: 67 IN | BODY MASS INDEX: 37.2 KG/M2

## 2023-05-04 DIAGNOSIS — Z36.85 SCREENING, ANTENATAL, FOR STREPTOCOCCUS B: Primary | ICD-10-CM

## 2023-05-04 LAB
ALBUMIN MFR UR ELPH: 93.2 MG/DL (ref 1–14)
ALBUMIN SERPL BCG-MCNC: 2.9 G/DL (ref 3.5–5.2)
ALP SERPL-CCNC: 95 U/L (ref 35–104)
ALT SERPL W P-5'-P-CCNC: 19 U/L (ref 10–35)
ANION GAP SERPL CALCULATED.3IONS-SCNC: 12 MMOL/L (ref 7–15)
AST SERPL W P-5'-P-CCNC: 33 U/L (ref 10–35)
BILIRUB SERPL-MCNC: 0.2 MG/DL
BUN SERPL-MCNC: 9.5 MG/DL (ref 6–20)
CALCIUM SERPL-MCNC: 9.3 MG/DL (ref 8.6–10)
CHLORIDE SERPL-SCNC: 102 MMOL/L (ref 98–107)
CREAT SERPL-MCNC: 0.66 MG/DL (ref 0.51–0.95)
CREAT UR-MCNC: 229.2 MG/DL
DEPRECATED HCO3 PLAS-SCNC: 19 MMOL/L (ref 22–29)
ERYTHROCYTE [DISTWIDTH] IN BLOOD BY AUTOMATED COUNT: 14.4 % (ref 10–15)
GFR SERPL CREATININE-BSD FRML MDRD: >90 ML/MIN/1.73M2
GLUCOSE SERPL-MCNC: 103 MG/DL (ref 70–99)
HCT VFR BLD AUTO: 36.6 % (ref 35–47)
HGB BLD-MCNC: 11.9 G/DL (ref 11.7–15.7)
MCH RBC QN AUTO: 27.2 PG (ref 26.5–33)
MCHC RBC AUTO-ENTMCNC: 32.5 G/DL (ref 31.5–36.5)
MCV RBC AUTO: 84 FL (ref 78–100)
PLATELET # BLD AUTO: 330 10E3/UL (ref 150–450)
POTASSIUM SERPL-SCNC: 3.8 MMOL/L (ref 3.4–5.3)
PROT SERPL-MCNC: 6.2 G/DL (ref 6.4–8.3)
PROT/CREAT 24H UR: 0.41 MG/MG CR (ref 0–0.2)
RBC # BLD AUTO: 4.38 10E6/UL (ref 3.8–5.2)
SODIUM SERPL-SCNC: 133 MMOL/L (ref 136–145)
WBC # BLD AUTO: 9.4 10E3/UL (ref 4–11)

## 2023-05-04 PROCEDURE — 85027 COMPLETE CBC AUTOMATED: CPT | Performed by: OBSTETRICS & GYNECOLOGY

## 2023-05-04 PROCEDURE — 84156 ASSAY OF PROTEIN URINE: CPT | Performed by: OBSTETRICS & GYNECOLOGY

## 2023-05-04 PROCEDURE — 87077 CULTURE AEROBIC IDENTIFY: CPT | Performed by: OBSTETRICS & GYNECOLOGY

## 2023-05-04 PROCEDURE — 59025 FETAL NON-STRESS TEST: CPT | Mod: 59

## 2023-05-04 PROCEDURE — 87653 STREP B DNA AMP PROBE: CPT | Performed by: OBSTETRICS & GYNECOLOGY

## 2023-05-04 PROCEDURE — 36415 COLL VENOUS BLD VENIPUNCTURE: CPT | Performed by: OBSTETRICS & GYNECOLOGY

## 2023-05-04 PROCEDURE — 87186 SC STD MICRODIL/AGAR DIL: CPT | Performed by: OBSTETRICS & GYNECOLOGY

## 2023-05-04 PROCEDURE — 99207 PR PRENATAL VISIT: CPT | Performed by: OBSTETRICS & GYNECOLOGY

## 2023-05-04 PROCEDURE — G0463 HOSPITAL OUTPT CLINIC VISIT: HCPCS | Mod: 25

## 2023-05-04 PROCEDURE — 76819 FETAL BIOPHYS PROFIL W/O NST: CPT

## 2023-05-04 PROCEDURE — 80053 COMPREHEN METABOLIC PANEL: CPT | Performed by: OBSTETRICS & GYNECOLOGY

## 2023-05-04 RX ORDER — ACETAMINOPHEN 500 MG
1000 TABLET ORAL EVERY 6 HOURS PRN
Status: ON HOLD | COMMUNITY
End: 2023-05-13

## 2023-05-04 RX ORDER — OMEPRAZOLE 20 MG/1
20 TABLET, DELAYED RELEASE ORAL DAILY
Status: ON HOLD | COMMUNITY
End: 2023-05-13

## 2023-05-04 ASSESSMENT — ACTIVITIES OF DAILY LIVING (ADL)
ADLS_ACUITY_SCORE: 20
ADLS_ACUITY_SCORE: 20

## 2023-05-04 NOTE — TELEPHONE ENCOUNTER
Called patient.  States she has a bad cold, negative for covid.  Managing OK, but has had DFM since yesterday.  Eating and drinking normally.  Recommending pt proceed to MAC at this time. Pt states it may be a few hours as she has  at her house. Pt understands recommendations are to proceed to MAC at this time.  Pt verbalized understanding, in agreement with plan, and voiced no further questions.  Fili Meeks RN on 5/4/2023 at 8:58 AM    
Statement Selected

## 2023-05-04 NOTE — DISCHARGE INSTRUCTIONS
Discharge Instructions for Undelivered Patients      You were seen for: Fetal Assessment  We Consulted: Dr. Go  You had (Test or Medicine): uterine & fetal monitoring, NST, BPP, labwork     Diet:   Drink 8 to 12 glasses of liquids (milk, juice, water) every day.  You may eat meals and snacks.     Activity:  Count fetal kicks everyday (see handout)  Call your doctor or nurse midwife if your baby is moving less than usual.     Call your provider if you notice:  Swelling in your face or increased swelling in your hands or legs.  Headaches that are not relieved by Tylenol (acetaminophen).  Changes in your vision (blurring: seeing spots or stars.)  Nausea (sick to your stomach) and vomiting (throwing up).   Weight gain of 5 pounds or more per week.  Heartburn that doesn't go away.  Signs of bladder infection: pain when you urinate (use the toilet), need to go more often and more urgently.  The bag of robertson (rupture of membranes) breaks, or you notice leaking in your underwear.  Bright red blood in your underwear.  Abdominal (lower belly) or stomach pain.  For first baby: Contractions (tightening) less than 5 minutes apart for one hour or more.  Increase or change in vaginal discharge (note the color and amount)    Follow-up:  As scheduled in the clinic - today at 3:45 pm.

## 2023-05-04 NOTE — PLAN OF CARE
"      1230 Patient arrived to maternal assessment center ambulatory, with significant other.    Patient reports reason for visit is decreased fetal movement.  She also reports having a cold and feels like it's hard to breathe sometimes.        G 1 P 0     36 weeks 1 days gestation     Prenatal record reviewed.         Verbal consent for EFM & TOCO.     Uterine assessment completed, non-tender and palpates soft between contractions.  She notes having occasional cramps for the past week.      Patient reports fetal movement is present, but decreased today.  She notes feeling baby move when arriving here.  Patient denies backache, vaginal discharge, pelvic pressure, UTI symptoms, GI problems, bloody show, vaginal bleeding, , epigastric or URQ pain, and/or rupture of membranes.  She notes feeling intermittent pain in her ULQ of her abdomen, denies now.  She notes having generalized edema for a few weeks, a little better today.  She notes vision seeming \"fuzzy\" when she coughs with her cold and doesn't have her glasses on.  She reports having a headache earlier today and took Tylenol, relief noted.     Dr. Go paged with return call received.  Updated on Patient's arrival, status with cold & decreased FM.  She is ordering a BPP & labs.  If WNL discharge pt to home, then have her follow up as scheduled (today at 1545); pt in agreement.    Labs WNL, just noticed urine protein order - collected urine & sent to lab, BPP 8/8.  Discharged pt home at 1517 ambulatory after reviewing discharge instructions.  Pt verbalizes understanding.         "

## 2023-05-04 NOTE — PROGRESS NOTES
Prenatal Visit: follow up after being seen in the hospital earlier for decreased fetal movement. Her fetal movement improved while in the hospital. She however had an initial elevated blood pressure. Her labs were notable for a p/c ratio of 0.4. I discussed that she has not met criteria for gestational hypertension or preeclampsia yet. I recommend a repeat blood pressure check tomorrow. If in the mild range I will be recommending an induction of labor at 37 weeks next Wednesday. No appointment seen for next week so she was asked to make one. GBS done today. SVE 0.5/50/-3. Reassuring BPP and NST. Rakel has a home cuff and is also monitoring her blood pressures as well.   RTC tomorrow for a BP check  Shima Go MD

## 2023-05-05 ENCOUNTER — TELEPHONE (OUTPATIENT)
Dept: OBGYN | Facility: CLINIC | Age: 33
End: 2023-05-05

## 2023-05-05 ENCOUNTER — HOSPITAL ENCOUNTER (OUTPATIENT)
Facility: CLINIC | Age: 33
Discharge: HOME | End: 2023-05-05
Attending: STUDENT IN AN ORGANIZED HEALTH CARE EDUCATION/TRAINING PROGRAM | Admitting: STUDENT IN AN ORGANIZED HEALTH CARE EDUCATION/TRAINING PROGRAM
Payer: COMMERCIAL

## 2023-05-05 VITALS
HEART RATE: 110 BPM | DIASTOLIC BLOOD PRESSURE: 82 MMHG | WEIGHT: 235 LBS | BODY MASS INDEX: 36.88 KG/M2 | HEIGHT: 67 IN | SYSTOLIC BLOOD PRESSURE: 132 MMHG

## 2023-05-05 LAB
ALBUMIN MFR UR ELPH: 75.1 MG/DL (ref 1–14)
ALBUMIN SERPL BCG-MCNC: 2.9 G/DL (ref 3.5–5.2)
ALP SERPL-CCNC: 96 U/L (ref 35–104)
ALT SERPL W P-5'-P-CCNC: 18 U/L (ref 10–35)
ANION GAP SERPL CALCULATED.3IONS-SCNC: 11 MMOL/L (ref 7–15)
AST SERPL W P-5'-P-CCNC: 29 U/L (ref 10–35)
BILIRUB SERPL-MCNC: 0.3 MG/DL
BUN SERPL-MCNC: 7.8 MG/DL (ref 6–20)
CALCIUM SERPL-MCNC: 9.4 MG/DL (ref 8.6–10)
CHLORIDE SERPL-SCNC: 105 MMOL/L (ref 98–107)
CREAT SERPL-MCNC: 0.68 MG/DL (ref 0.51–0.95)
CREAT UR-MCNC: 211 MG/DL
DEPRECATED HCO3 PLAS-SCNC: 18 MMOL/L (ref 22–29)
ERYTHROCYTE [DISTWIDTH] IN BLOOD BY AUTOMATED COUNT: 14.6 % (ref 10–15)
GFR SERPL CREATININE-BSD FRML MDRD: >90 ML/MIN/1.73M2
GLUCOSE SERPL-MCNC: 103 MG/DL (ref 70–99)
HCT VFR BLD AUTO: 36.3 % (ref 35–47)
HGB BLD-MCNC: 11.9 G/DL (ref 11.7–15.7)
MCH RBC QN AUTO: 27.1 PG (ref 26.5–33)
MCHC RBC AUTO-ENTMCNC: 32.8 G/DL (ref 31.5–36.5)
MCV RBC AUTO: 83 FL (ref 78–100)
PLATELET # BLD AUTO: 301 10E3/UL (ref 150–450)
POTASSIUM SERPL-SCNC: 3.8 MMOL/L (ref 3.4–5.3)
PROT SERPL-MCNC: 6.3 G/DL (ref 6.4–8.3)
PROT/CREAT 24H UR: 0.36 MG/MG CR (ref 0–0.2)
RBC # BLD AUTO: 4.39 10E6/UL (ref 3.8–5.2)
SODIUM SERPL-SCNC: 134 MMOL/L (ref 136–145)
WBC # BLD AUTO: 7.8 10E3/UL (ref 4–11)

## 2023-05-05 PROCEDURE — 36415 COLL VENOUS BLD VENIPUNCTURE: CPT | Performed by: STUDENT IN AN ORGANIZED HEALTH CARE EDUCATION/TRAINING PROGRAM

## 2023-05-05 PROCEDURE — 59025 FETAL NON-STRESS TEST: CPT

## 2023-05-05 PROCEDURE — 80053 COMPREHEN METABOLIC PANEL: CPT | Performed by: STUDENT IN AN ORGANIZED HEALTH CARE EDUCATION/TRAINING PROGRAM

## 2023-05-05 PROCEDURE — 85014 HEMATOCRIT: CPT | Performed by: STUDENT IN AN ORGANIZED HEALTH CARE EDUCATION/TRAINING PROGRAM

## 2023-05-05 PROCEDURE — G0463 HOSPITAL OUTPT CLINIC VISIT: HCPCS | Mod: 25

## 2023-05-05 PROCEDURE — 250N000013 HC RX MED GY IP 250 OP 250 PS 637: Performed by: STUDENT IN AN ORGANIZED HEALTH CARE EDUCATION/TRAINING PROGRAM

## 2023-05-05 PROCEDURE — 84156 ASSAY OF PROTEIN URINE: CPT | Performed by: STUDENT IN AN ORGANIZED HEALTH CARE EDUCATION/TRAINING PROGRAM

## 2023-05-05 RX ORDER — DIPHENHYDRAMINE HYDROCHLORIDE 50 MG/ML
25 INJECTION INTRAMUSCULAR; INTRAVENOUS EVERY 6 HOURS PRN
Status: DISCONTINUED | OUTPATIENT
Start: 2023-05-05 | End: 2023-05-05 | Stop reason: HOSPADM

## 2023-05-05 RX ORDER — METOCLOPRAMIDE 10 MG/1
10 TABLET ORAL
Status: DISCONTINUED | OUTPATIENT
Start: 2023-05-05 | End: 2023-05-05 | Stop reason: HOSPADM

## 2023-05-05 RX ORDER — ONDANSETRON 4 MG/1
4 TABLET, ORALLY DISINTEGRATING ORAL EVERY 6 HOURS PRN
Status: DISCONTINUED | OUTPATIENT
Start: 2023-05-05 | End: 2023-05-05 | Stop reason: HOSPADM

## 2023-05-05 RX ORDER — METOCLOPRAMIDE HYDROCHLORIDE 5 MG/ML
10 INJECTION INTRAMUSCULAR; INTRAVENOUS EVERY 6 HOURS PRN
Status: DISCONTINUED | OUTPATIENT
Start: 2023-05-05 | End: 2023-05-05 | Stop reason: HOSPADM

## 2023-05-05 RX ORDER — METOCLOPRAMIDE 10 MG/1
10 TABLET ORAL EVERY 6 HOURS PRN
Status: DISCONTINUED | OUTPATIENT
Start: 2023-05-05 | End: 2023-05-05 | Stop reason: HOSPADM

## 2023-05-05 RX ORDER — DIPHENHYDRAMINE HCL 25 MG
25 CAPSULE ORAL EVERY 6 HOURS PRN
Status: DISCONTINUED | OUTPATIENT
Start: 2023-05-05 | End: 2023-05-05 | Stop reason: HOSPADM

## 2023-05-05 RX ORDER — PROCHLORPERAZINE MALEATE 5 MG
10 TABLET ORAL EVERY 6 HOURS PRN
Status: DISCONTINUED | OUTPATIENT
Start: 2023-05-05 | End: 2023-05-05 | Stop reason: HOSPADM

## 2023-05-05 RX ORDER — ONDANSETRON 2 MG/ML
4 INJECTION INTRAMUSCULAR; INTRAVENOUS EVERY 6 HOURS PRN
Status: DISCONTINUED | OUTPATIENT
Start: 2023-05-05 | End: 2023-05-05 | Stop reason: HOSPADM

## 2023-05-05 RX ORDER — PROCHLORPERAZINE 25 MG
25 SUPPOSITORY, RECTAL RECTAL EVERY 12 HOURS PRN
Status: DISCONTINUED | OUTPATIENT
Start: 2023-05-05 | End: 2023-05-05 | Stop reason: HOSPADM

## 2023-05-05 RX ADMIN — DIPHENHYDRAMINE HYDROCHLORIDE 25 MG: 25 CAPSULE ORAL at 15:18

## 2023-05-05 RX ADMIN — METOCLOPRAMIDE 10 MG: 10 TABLET ORAL at 15:18

## 2023-05-05 ASSESSMENT — ACTIVITIES OF DAILY LIVING (ADL): ADLS_ACUITY_SCORE: 31

## 2023-05-05 NOTE — TELEPHONE ENCOUNTER
36w2d      Pt was in MAC yesterday for monitoring and was to come into clinic for BP check. She is calling w changes and concerning worsening of sx.    Face is very swollen today compared to yesterday; eyes almost swollen shut; her mother even noticed her face so swollen like she had lip surgery.  HA is different today - more like a HA and not congestion from her cold.    BP at home 140/90 and 140/97    Denies RUQ pain or visual changes.   No cx or vaginal sx  FM reported      Dr Agrawal consulted and order to send to [facility] for further eval.    Pt instructed and in agreement w plan.  [facility] notified pt coming.    Kanchan Garcia RN on 5/5/2023 at 12:06 PM

## 2023-05-05 NOTE — PROVIDER NOTIFICATION
05/05/23 1603   Provider Notification   Provider Name/Title Nghia   Method of Notification Electronic Page     Immediate return of page. Updated with lab results and complete relief of headache following medications. Order to discharge home and call Monday morning for follow up appointment. Will need blood pressure check at that time. POC reviewed with pt. Questions answered. Discharge home in family car.

## 2023-05-05 NOTE — DISCHARGE INSTRUCTIONS
Discharge Instruction for Undelivered Patients      You were seen for:  Elevated blood pressure evaluation  We Consulted: Dr. Agrawal  You had (Test or Medicine):NST (non stress test), blood work, urine for protein, Benadryl 25 mg, Reglan 10mg orally.     Diet:   Drink 8 to 12 glasses of liquids (milk, juice, water) every day.  You may eat meals and snacks.     Activity:  Call your doctor or nurse midwife if your baby is moving less than usual.     Call your provider if you notice:  Swelling in your face or increased swelling in your hands or legs.  Headaches that are not relieved by Tylenol (acetaminophen).  Changes in your vision (blurring: seeing spots or stars.)  Nausea (sick to your stomach) and vomiting (throwing up).   Weight gain of 5 pounds or more per week.  Heartburn that doesn't go away.  Signs of bladder infection: pain when you urinate (use the toilet), need to go more often and more urgently.  The bag of robertson (rupture of membranes) breaks, or you notice leaking in your underwear.  Bright red blood in your underwear.  Abdominal (lower belly) or stomach pain.  For first baby: Contractions (tightening) less than 5 minutes apart for one hour or more.  Second (plus) baby: Contractions (tightening) less than 10 minutes apart and getting stronger.  *If less than 34 weeks: Contractions (tightening) more than 6 times in one hour.  Increase or change in vaginal discharge (note the color and amount)  Other:     Follow-up:  Call clinic Monday morning 5/8/2023 to schedule a blood pressure check this day.   [facility] #[phone]

## 2023-05-05 NOTE — PLAN OF CARE
36+2 week pt of Dr. Go ambulates to [facility] from home for gestational hypertension evaluation. States she had a blood pressure at home around 1200 that was 140's/90's. Has a slight headache. Some relief with tylenol. Denies epigastric pain or visual problems. Hypotensive reflexes, no clonus. BP's per flow sheet. Admission data base reviewed. POC reviewed. Support given. Page to Dr. Agrawal.

## 2023-05-05 NOTE — PROVIDER NOTIFICATION
05/05/23 1420   Provider Notification   Provider Name/Title Terri   Method of Notification Electronic Page     Immediate return of page. Updated on pt arrival, blood pressures, HA, swelling, no clonus. Orders for CMP, CBC P/C ratio urine, benadryl 25mg and Reglan 10mg PO for headache. POC reviewed with pt and family. Support given.

## 2023-05-05 NOTE — TELEPHONE ENCOUNTER
Type of Paperwork received:  Leave and disability     Date Rcvd:  na    Rcvd From (Company name): [employer]    Provider:  Donavan Juarez on Provider Cart Date:  Na    Faxed to [employer]  Scanned to chart  5/5/2023

## 2023-05-06 LAB — GP B STREP DNA SPEC QL NAA+PROBE: POSITIVE

## 2023-05-07 ENCOUNTER — NURSE TRIAGE (OUTPATIENT)
Dept: NURSING | Facility: CLINIC | Age: 33
End: 2023-05-07
Payer: COMMERCIAL

## 2023-05-08 ENCOUNTER — HOSPITAL ENCOUNTER (OUTPATIENT)
Facility: CLINIC | Age: 33
Discharge: HOME | End: 2023-05-08
Attending: STUDENT IN AN ORGANIZED HEALTH CARE EDUCATION/TRAINING PROGRAM | Admitting: STUDENT IN AN ORGANIZED HEALTH CARE EDUCATION/TRAINING PROGRAM
Payer: COMMERCIAL

## 2023-05-08 VITALS
HEIGHT: 67 IN | SYSTOLIC BLOOD PRESSURE: 120 MMHG | BODY MASS INDEX: 36.88 KG/M2 | DIASTOLIC BLOOD PRESSURE: 75 MMHG | TEMPERATURE: 98.2 F | WEIGHT: 235 LBS | RESPIRATION RATE: 18 BRPM

## 2023-05-08 PROBLEM — Z36.89 ENCOUNTER FOR TRIAGE IN PREGNANT PATIENT: Status: ACTIVE | Noted: 2023-05-08

## 2023-05-08 LAB
ALBUMIN MFR UR ELPH: 59.7 MG/DL (ref 1–14)
ALBUMIN SERPL BCG-MCNC: 3 G/DL (ref 3.5–5.2)
ALP SERPL-CCNC: 98 U/L (ref 35–104)
ALT SERPL W P-5'-P-CCNC: 20 U/L (ref 10–35)
ANION GAP SERPL CALCULATED.3IONS-SCNC: 11 MMOL/L (ref 7–15)
AST SERPL W P-5'-P-CCNC: 35 U/L (ref 10–35)
BILIRUB SERPL-MCNC: 0.2 MG/DL
BUN SERPL-MCNC: 8.2 MG/DL (ref 6–20)
CALCIUM SERPL-MCNC: 9.3 MG/DL (ref 8.6–10)
CHLORIDE SERPL-SCNC: 103 MMOL/L (ref 98–107)
CREAT SERPL-MCNC: 0.68 MG/DL (ref 0.51–0.95)
CREAT UR-MCNC: 177.4 MG/DL
DEPRECATED HCO3 PLAS-SCNC: 20 MMOL/L (ref 22–29)
ERYTHROCYTE [DISTWIDTH] IN BLOOD BY AUTOMATED COUNT: 14.3 % (ref 10–15)
GFR SERPL CREATININE-BSD FRML MDRD: >90 ML/MIN/1.73M2
GLUCOSE SERPL-MCNC: 104 MG/DL (ref 70–99)
HCT VFR BLD AUTO: 36.2 % (ref 35–47)
HGB BLD-MCNC: 12 G/DL (ref 11.7–15.7)
HOLD SPECIMEN: NORMAL
MCH RBC QN AUTO: 27.2 PG (ref 26.5–33)
MCHC RBC AUTO-ENTMCNC: 33.1 G/DL (ref 31.5–36.5)
MCV RBC AUTO: 82 FL (ref 78–100)
PLATELET # BLD AUTO: 354 10E3/UL (ref 150–450)
POTASSIUM SERPL-SCNC: 4 MMOL/L (ref 3.4–5.3)
PROT SERPL-MCNC: 6.4 G/DL (ref 6.4–8.3)
PROT/CREAT 24H UR: 0.34 MG/MG CR (ref 0–0.2)
RBC # BLD AUTO: 4.41 10E6/UL (ref 3.8–5.2)
SODIUM SERPL-SCNC: 134 MMOL/L (ref 136–145)
WBC # BLD AUTO: 9 10E3/UL (ref 4–11)

## 2023-05-08 PROCEDURE — 80053 COMPREHEN METABOLIC PANEL: CPT | Performed by: STUDENT IN AN ORGANIZED HEALTH CARE EDUCATION/TRAINING PROGRAM

## 2023-05-08 PROCEDURE — 59025 FETAL NON-STRESS TEST: CPT

## 2023-05-08 PROCEDURE — G0463 HOSPITAL OUTPT CLINIC VISIT: HCPCS | Mod: 25

## 2023-05-08 PROCEDURE — 36415 COLL VENOUS BLD VENIPUNCTURE: CPT | Performed by: STUDENT IN AN ORGANIZED HEALTH CARE EDUCATION/TRAINING PROGRAM

## 2023-05-08 PROCEDURE — 84156 ASSAY OF PROTEIN URINE: CPT | Performed by: STUDENT IN AN ORGANIZED HEALTH CARE EDUCATION/TRAINING PROGRAM

## 2023-05-08 PROCEDURE — 999N000105 HC STATISTIC NO DOCUMENTATION TO SUPPORT CHARGE

## 2023-05-08 PROCEDURE — 85027 COMPLETE CBC AUTOMATED: CPT | Performed by: STUDENT IN AN ORGANIZED HEALTH CARE EDUCATION/TRAINING PROGRAM

## 2023-05-08 RX ORDER — ONDANSETRON 4 MG/1
4 TABLET, ORALLY DISINTEGRATING ORAL EVERY 6 HOURS PRN
Status: DISCONTINUED | OUTPATIENT
Start: 2023-05-08 | End: 2023-05-08 | Stop reason: HOSPADM

## 2023-05-08 RX ORDER — PROCHLORPERAZINE 25 MG
25 SUPPOSITORY, RECTAL RECTAL EVERY 12 HOURS PRN
Status: DISCONTINUED | OUTPATIENT
Start: 2023-05-08 | End: 2023-05-08 | Stop reason: HOSPADM

## 2023-05-08 RX ORDER — METOCLOPRAMIDE HYDROCHLORIDE 5 MG/ML
10 INJECTION INTRAMUSCULAR; INTRAVENOUS EVERY 6 HOURS PRN
Status: DISCONTINUED | OUTPATIENT
Start: 2023-05-08 | End: 2023-05-08 | Stop reason: HOSPADM

## 2023-05-08 RX ORDER — PROCHLORPERAZINE MALEATE 5 MG
10 TABLET ORAL EVERY 6 HOURS PRN
Status: DISCONTINUED | OUTPATIENT
Start: 2023-05-08 | End: 2023-05-08 | Stop reason: HOSPADM

## 2023-05-08 RX ORDER — METOCLOPRAMIDE 10 MG/1
10 TABLET ORAL EVERY 6 HOURS PRN
Status: DISCONTINUED | OUTPATIENT
Start: 2023-05-08 | End: 2023-05-08 | Stop reason: HOSPADM

## 2023-05-08 RX ORDER — ONDANSETRON 2 MG/ML
4 INJECTION INTRAMUSCULAR; INTRAVENOUS EVERY 6 HOURS PRN
Status: DISCONTINUED | OUTPATIENT
Start: 2023-05-08 | End: 2023-05-08 | Stop reason: HOSPADM

## 2023-05-08 ASSESSMENT — ACTIVITIES OF DAILY LIVING (ADL): ADLS_ACUITY_SCORE: 20

## 2023-05-08 NOTE — PLAN OF CARE
Data: [facility]: 2023 12:44 AM.  Reason for maternal/fetal assessment is elevated blood pressures. Patient reports BP at home to be 150s/90s. Pt states that she has had episodes of visual disturbances/ headaches, but neither present at this time. Patient denies uterine contractions, leaking of vaginal fluid/rupture of membranes, vaginal bleeding, abdominal pain, pelvic pressure, nausea, vomiting. Patient reports fetal movement is normal. Patient is a 36w5d .  Prenatal record reviewed. Pregnancy has been complicated by hypertension.    Vital signs wnl, BPs 130s/70s. Lungs clear and reflexes normal. No clonus present. Support person is present.     Action: Verbal consent for EFM, labs drawn and Pre E assessment completed. Triage assessment completed.     Response: Patient verbalized agreement with plan. Will contact Dr Murray with update and further orders.     MD updated on Labs and VS. MD okay to discharge pt home, would like pt to make an appointment this week for further evaluation.     Discharge instructions addressed with pt and her . All questions and concerns addressed. Pt left for home at 0227.

## 2023-05-08 NOTE — TELEPHONE ENCOUNTER
133/83 am.   150/90.  Protein was too high on Thursday.  [facility].    Same     Nurse Triage SBAR    Is this a 2nd Level Triage? YES, LICENSED PRACTITIONER REVIEW IS REQUIRED    Situation:  Pt is  36 weeks 4 days pregnant.  HTN since last week.  Seen last on Thursday.     Background: seen last on Thursday.  She states she is the same as far as swelling and but her BP keeps going up.     Assessment: Same sxs.  Did have some fluttering in her  Vision.  HA that went away with tylenol this evening.     Swelling is bilaterally up into her thighs. Denies SOB,  HA now. 133/83 am.   150/90.  Protein was too high on Thursday.  [facility].      Protocol Recommended Disposition:   Go To LD Now    Recommendation: called report to Martha at L&D at [facility].    .Reason for Disposition    [1] Pregnant 20 or more weeks AND [2] new blurred vision or vision changes    Additional Information    Negative: SEVERE difficulty breathing (e.g., struggling for each breath, speaks in single words)    Negative: Sounds like a life-threatening emergency to the triager    Protocols used: PREGNANCY - LEG SWELLING AND EDEMA-A-    Mariama Florez RN on 2023 at 12:13 AM

## 2023-05-08 NOTE — DISCHARGE INSTRUCTIONS
Discharge Instruction for Undelivered Patients      You were seen for:  Coleen Lorenzo  We Consulted: Dr Agrawal  You had (Test or Medicine):Fetal monitoring and labs drawn.     Diet:   Drink 8 to 12 glasses of liquids (milk, juice, water) every day.  You may eat meals and snacks.     Activity:  Count fetal kicks everyday (see handout)  Call your doctor or nurse midwife if your baby is moving less than usual.     Call your provider if you notice:  Swelling in your face or increased swelling in your hands or legs.  Headaches that are not relieved by Tylenol (acetaminophen).  Changes in your vision (blurring: seeing spots or stars.)  Nausea (sick to your stomach) and vomiting (throwing up).   Weight gain of 5 pounds or more per week.  Heartburn that doesn't go away.  Signs of bladder infection: pain when you urinate (use the toilet), need to go more often and more urgently.  The bag of robertson (rupture of membranes) breaks, or you notice leaking in your underwear.  Bright red blood in your underwear.  Abdominal (lower belly) or stomach pain.  For first baby: Contractions (tightening) less than 5 minutes apart for one hour or more.  Increase or change in vaginal discharge (note the color and amount)      Follow-up:  In clinic this week. Please make an appointment if you do not already have one scheduled.

## 2023-05-09 ENCOUNTER — PRENATAL OFFICE VISIT (OUTPATIENT)
Dept: OBGYN | Facility: CLINIC | Age: 33
End: 2023-05-09
Payer: COMMERCIAL

## 2023-05-09 ENCOUNTER — HOSPITAL ENCOUNTER (INPATIENT)
Facility: CLINIC | Age: 33
LOS: 4 days | Discharge: HOME | End: 2023-05-13
Attending: OBSTETRICS & GYNECOLOGY | Admitting: OBSTETRICS & GYNECOLOGY
Payer: COMMERCIAL

## 2023-05-09 VITALS — SYSTOLIC BLOOD PRESSURE: 156 MMHG | DIASTOLIC BLOOD PRESSURE: 82 MMHG | BODY MASS INDEX: 38.65 KG/M2 | WEIGHT: 246.8 LBS

## 2023-05-09 DIAGNOSIS — O99.213 OBESITY IN PREGNANCY, ANTEPARTUM, THIRD TRIMESTER: ICD-10-CM

## 2023-05-09 DIAGNOSIS — Z3A.36 36 WEEKS GESTATION OF PREGNANCY: ICD-10-CM

## 2023-05-09 DIAGNOSIS — O14.03 MILD PRE-ECLAMPSIA IN THIRD TRIMESTER: ICD-10-CM

## 2023-05-09 DIAGNOSIS — O09.92 SUPERVISION OF HIGH RISK PREGNANCY IN SECOND TRIMESTER: Primary | ICD-10-CM

## 2023-05-09 PROBLEM — Z36.89 ENCOUNTER FOR TRIAGE IN PREGNANT PATIENT: Status: RESOLVED | Noted: 2023-05-08 | Resolved: 2023-05-09

## 2023-05-09 PROBLEM — O14.00 MILD PRE-ECLAMPSIA: Status: ACTIVE | Noted: 2023-05-09

## 2023-05-09 LAB
ABO/RH(D): NORMAL
ANTIBODY SCREEN: NEGATIVE
BACTERIA SPEC CULT: ABNORMAL
BASOPHILS # BLD AUTO: 0 10E3/UL (ref 0–0.2)
BASOPHILS NFR BLD AUTO: 0 %
EOSINOPHIL # BLD AUTO: 0 10E3/UL (ref 0–0.7)
EOSINOPHIL NFR BLD AUTO: 0 %
ERYTHROCYTE [DISTWIDTH] IN BLOOD BY AUTOMATED COUNT: 14.3 % (ref 10–15)
HCT VFR BLD AUTO: 35.9 % (ref 35–47)
HGB BLD-MCNC: 11.7 G/DL (ref 11.7–15.7)
IMM GRANULOCYTES # BLD: 0.1 10E3/UL
IMM GRANULOCYTES NFR BLD: 1 %
LYMPHOCYTES # BLD AUTO: 2.6 10E3/UL (ref 0.8–5.3)
LYMPHOCYTES NFR BLD AUTO: 24 %
MCH RBC QN AUTO: 27 PG (ref 26.5–33)
MCHC RBC AUTO-ENTMCNC: 32.6 G/DL (ref 31.5–36.5)
MCV RBC AUTO: 83 FL (ref 78–100)
MONOCYTES # BLD AUTO: 0.5 10E3/UL (ref 0–1.3)
MONOCYTES NFR BLD AUTO: 5 %
NEUTROPHILS # BLD AUTO: 7.6 10E3/UL (ref 1.6–8.3)
NEUTROPHILS NFR BLD AUTO: 70 %
NRBC # BLD AUTO: 0 10E3/UL
NRBC BLD AUTO-RTO: 0 /100
PLATELET # BLD AUTO: 301 10E3/UL (ref 150–450)
RBC # BLD AUTO: 4.33 10E6/UL (ref 3.8–5.2)
SPECIMEN EXPIRATION DATE: NORMAL
WBC # BLD AUTO: 10.9 10E3/UL (ref 4–11)

## 2023-05-09 PROCEDURE — 86901 BLOOD TYPING SEROLOGIC RH(D): CPT | Performed by: OBSTETRICS & GYNECOLOGY

## 2023-05-09 PROCEDURE — 36415 COLL VENOUS BLD VENIPUNCTURE: CPT | Performed by: OBSTETRICS & GYNECOLOGY

## 2023-05-09 PROCEDURE — 86780 TREPONEMA PALLIDUM: CPT | Performed by: OBSTETRICS & GYNECOLOGY

## 2023-05-09 PROCEDURE — 120N000001 HC R&B MED SURG/OB

## 2023-05-09 PROCEDURE — 85025 COMPLETE CBC W/AUTO DIFF WBC: CPT | Performed by: OBSTETRICS & GYNECOLOGY

## 2023-05-09 PROCEDURE — 80053 COMPREHEN METABOLIC PANEL: CPT | Performed by: OBSTETRICS & GYNECOLOGY

## 2023-05-09 PROCEDURE — 99207 PR PRENATAL VISIT: CPT | Performed by: OBSTETRICS & GYNECOLOGY

## 2023-05-09 RX ORDER — METOCLOPRAMIDE HYDROCHLORIDE 5 MG/ML
10 INJECTION INTRAMUSCULAR; INTRAVENOUS EVERY 6 HOURS PRN
Status: DISCONTINUED | OUTPATIENT
Start: 2023-05-09 | End: 2023-05-11 | Stop reason: HOSPADM

## 2023-05-09 RX ORDER — TERBUTALINE SULFATE 1 MG/ML
0.25 INJECTION, SOLUTION SUBCUTANEOUS
Status: CANCELLED | OUTPATIENT
Start: 2023-05-09

## 2023-05-09 RX ORDER — MISOPROSTOL 200 UG/1
400 TABLET ORAL
Status: DISCONTINUED | OUTPATIENT
Start: 2023-05-09 | End: 2023-05-11 | Stop reason: HOSPADM

## 2023-05-09 RX ORDER — NALOXONE HYDROCHLORIDE 0.4 MG/ML
0.4 INJECTION, SOLUTION INTRAMUSCULAR; INTRAVENOUS; SUBCUTANEOUS
Status: DISCONTINUED | OUTPATIENT
Start: 2023-05-09 | End: 2023-05-11 | Stop reason: HOSPADM

## 2023-05-09 RX ORDER — MISOPROSTOL 100 UG/1
25 TABLET ORAL EVERY 4 HOURS PRN
Status: CANCELLED | OUTPATIENT
Start: 2023-05-09

## 2023-05-09 RX ORDER — CARBOPROST TROMETHAMINE 250 UG/ML
250 INJECTION, SOLUTION INTRAMUSCULAR
Status: DISCONTINUED | OUTPATIENT
Start: 2023-05-09 | End: 2023-05-11 | Stop reason: HOSPADM

## 2023-05-09 RX ORDER — MORPHINE SULFATE 10 MG/ML
10 INJECTION, SOLUTION INTRAMUSCULAR; INTRAVENOUS
Status: DISCONTINUED | OUTPATIENT
Start: 2023-05-09 | End: 2023-05-11 | Stop reason: HOSPADM

## 2023-05-09 RX ORDER — NALOXONE HYDROCHLORIDE 0.4 MG/ML
0.2 INJECTION, SOLUTION INTRAMUSCULAR; INTRAVENOUS; SUBCUTANEOUS
Status: CANCELLED | OUTPATIENT
Start: 2023-05-09

## 2023-05-09 RX ORDER — OXYTOCIN/0.9 % SODIUM CHLORIDE 30/500 ML
100-340 PLASTIC BAG, INJECTION (ML) INTRAVENOUS CONTINUOUS PRN
Status: CANCELLED | OUTPATIENT
Start: 2023-05-09

## 2023-05-09 RX ORDER — ACETAMINOPHEN 325 MG/1
650 TABLET ORAL EVERY 4 HOURS PRN
Status: DISCONTINUED | OUTPATIENT
Start: 2023-05-09 | End: 2023-05-11 | Stop reason: HOSPADM

## 2023-05-09 RX ORDER — MISOPROSTOL 100 UG/1
25 TABLET ORAL EVERY 4 HOURS PRN
Status: DISCONTINUED | OUTPATIENT
Start: 2023-05-09 | End: 2023-05-11 | Stop reason: HOSPADM

## 2023-05-09 RX ORDER — FENTANYL CITRATE 50 UG/ML
100 INJECTION, SOLUTION INTRAMUSCULAR; INTRAVENOUS
Status: DISCONTINUED | OUTPATIENT
Start: 2023-05-09 | End: 2023-05-11 | Stop reason: HOSPADM

## 2023-05-09 RX ORDER — KETOROLAC TROMETHAMINE 30 MG/ML
30 INJECTION, SOLUTION INTRAMUSCULAR; INTRAVENOUS
Status: CANCELLED | OUTPATIENT
Start: 2023-05-09 | End: 2023-05-14

## 2023-05-09 RX ORDER — ONDANSETRON 2 MG/ML
4 INJECTION INTRAMUSCULAR; INTRAVENOUS EVERY 6 HOURS PRN
Status: DISCONTINUED | OUTPATIENT
Start: 2023-05-09 | End: 2023-05-11 | Stop reason: HOSPADM

## 2023-05-09 RX ORDER — ONDANSETRON 4 MG/1
4 TABLET, ORALLY DISINTEGRATING ORAL EVERY 6 HOURS PRN
Status: CANCELLED | OUTPATIENT
Start: 2023-05-09

## 2023-05-09 RX ORDER — PROCHLORPERAZINE 25 MG
25 SUPPOSITORY, RECTAL RECTAL EVERY 12 HOURS PRN
Status: CANCELLED | OUTPATIENT
Start: 2023-05-09

## 2023-05-09 RX ORDER — NALOXONE HYDROCHLORIDE 0.4 MG/ML
0.2 INJECTION, SOLUTION INTRAMUSCULAR; INTRAVENOUS; SUBCUTANEOUS
Status: DISCONTINUED | OUTPATIENT
Start: 2023-05-09 | End: 2023-05-11 | Stop reason: HOSPADM

## 2023-05-09 RX ORDER — HYDRALAZINE HYDROCHLORIDE 20 MG/ML
10 INJECTION INTRAMUSCULAR; INTRAVENOUS
Status: CANCELLED | OUTPATIENT
Start: 2023-05-09

## 2023-05-09 RX ORDER — HYDROXYZINE HYDROCHLORIDE 50 MG/1
50 TABLET, FILM COATED ORAL
Status: CANCELLED | OUTPATIENT
Start: 2023-05-09

## 2023-05-09 RX ORDER — MORPHINE SULFATE 10 MG/ML
10 INJECTION, SOLUTION INTRAMUSCULAR; INTRAVENOUS
Status: CANCELLED | OUTPATIENT
Start: 2023-05-09

## 2023-05-09 RX ORDER — LABETALOL HYDROCHLORIDE 5 MG/ML
20-80 INJECTION, SOLUTION INTRAVENOUS EVERY 10 MIN PRN
Status: DISCONTINUED | OUTPATIENT
Start: 2023-05-09 | End: 2023-05-12

## 2023-05-09 RX ORDER — CEFAZOLIN SODIUM 1 G/3ML
1 INJECTION, POWDER, FOR SOLUTION INTRAMUSCULAR; INTRAVENOUS EVERY 8 HOURS
Status: DISCONTINUED | OUTPATIENT
Start: 2023-05-10 | End: 2023-05-11 | Stop reason: HOSPADM

## 2023-05-09 RX ORDER — OXYTOCIN 10 [USP'U]/ML
10 INJECTION, SOLUTION INTRAMUSCULAR; INTRAVENOUS
Status: CANCELLED | OUTPATIENT
Start: 2023-05-09

## 2023-05-09 RX ORDER — FENTANYL CITRATE 50 UG/ML
100 INJECTION, SOLUTION INTRAMUSCULAR; INTRAVENOUS
Status: CANCELLED | OUTPATIENT
Start: 2023-05-09

## 2023-05-09 RX ORDER — HYDROXYZINE HYDROCHLORIDE 25 MG/1
50 TABLET, FILM COATED ORAL
Status: DISCONTINUED | OUTPATIENT
Start: 2023-05-09 | End: 2023-05-11 | Stop reason: HOSPADM

## 2023-05-09 RX ORDER — OXYTOCIN 10 [USP'U]/ML
10 INJECTION, SOLUTION INTRAMUSCULAR; INTRAVENOUS
Status: DISCONTINUED | OUTPATIENT
Start: 2023-05-09 | End: 2023-05-11 | Stop reason: HOSPADM

## 2023-05-09 RX ORDER — NALOXONE HYDROCHLORIDE 0.4 MG/ML
0.4 INJECTION, SOLUTION INTRAMUSCULAR; INTRAVENOUS; SUBCUTANEOUS
Status: CANCELLED | OUTPATIENT
Start: 2023-05-09

## 2023-05-09 RX ORDER — IBUPROFEN 200 MG
800 TABLET ORAL
Status: CANCELLED | OUTPATIENT
Start: 2023-05-09 | End: 2023-05-14

## 2023-05-09 RX ORDER — CEFAZOLIN SODIUM 2 G/100ML
2 INJECTION, SOLUTION INTRAVENOUS ONCE
Status: COMPLETED | OUTPATIENT
Start: 2023-05-09 | End: 2023-05-10

## 2023-05-09 RX ORDER — LABETALOL HYDROCHLORIDE 5 MG/ML
20-80 INJECTION, SOLUTION INTRAVENOUS EVERY 10 MIN PRN
Status: CANCELLED | OUTPATIENT
Start: 2023-05-09

## 2023-05-09 RX ORDER — ONDANSETRON 4 MG/1
4 TABLET, ORALLY DISINTEGRATING ORAL EVERY 6 HOURS PRN
Status: DISCONTINUED | OUTPATIENT
Start: 2023-05-09 | End: 2023-05-11 | Stop reason: HOSPADM

## 2023-05-09 RX ORDER — CITRIC ACID/SODIUM CITRATE 334-500MG
30 SOLUTION, ORAL ORAL
Status: DISCONTINUED | OUTPATIENT
Start: 2023-05-09 | End: 2023-05-11 | Stop reason: HOSPADM

## 2023-05-09 RX ORDER — TERBUTALINE SULFATE 1 MG/ML
0.25 INJECTION, SOLUTION SUBCUTANEOUS
Status: DISCONTINUED | OUTPATIENT
Start: 2023-05-09 | End: 2023-05-11 | Stop reason: HOSPADM

## 2023-05-09 RX ORDER — OXYTOCIN/0.9 % SODIUM CHLORIDE 30/500 ML
340 PLASTIC BAG, INJECTION (ML) INTRAVENOUS CONTINUOUS PRN
Status: CANCELLED | OUTPATIENT
Start: 2023-05-09

## 2023-05-09 RX ORDER — PROCHLORPERAZINE MALEATE 5 MG
10 TABLET ORAL EVERY 6 HOURS PRN
Status: CANCELLED | OUTPATIENT
Start: 2023-05-09

## 2023-05-09 RX ORDER — ONDANSETRON 2 MG/ML
4 INJECTION INTRAMUSCULAR; INTRAVENOUS EVERY 6 HOURS PRN
Status: CANCELLED | OUTPATIENT
Start: 2023-05-09

## 2023-05-09 RX ORDER — METOCLOPRAMIDE HYDROCHLORIDE 5 MG/ML
10 INJECTION INTRAMUSCULAR; INTRAVENOUS EVERY 6 HOURS PRN
Status: CANCELLED | OUTPATIENT
Start: 2023-05-09

## 2023-05-09 RX ORDER — CEFAZOLIN SODIUM 1 G/3ML
1 INJECTION, POWDER, FOR SOLUTION INTRAMUSCULAR; INTRAVENOUS EVERY 8 HOURS
Status: CANCELLED | OUTPATIENT
Start: 2023-05-09

## 2023-05-09 RX ORDER — ACETAMINOPHEN 325 MG/1
650 TABLET ORAL EVERY 4 HOURS PRN
Status: CANCELLED | OUTPATIENT
Start: 2023-05-09

## 2023-05-09 RX ORDER — METOCLOPRAMIDE 5 MG/1
10 TABLET ORAL EVERY 6 HOURS PRN
Status: CANCELLED | OUTPATIENT
Start: 2023-05-09

## 2023-05-09 RX ORDER — CARBOPROST TROMETHAMINE 250 UG/ML
250 INJECTION, SOLUTION INTRAMUSCULAR
Status: CANCELLED | OUTPATIENT
Start: 2023-05-09

## 2023-05-09 RX ORDER — TRANEXAMIC ACID 10 MG/ML
1 INJECTION, SOLUTION INTRAVENOUS EVERY 30 MIN PRN
Status: DISCONTINUED | OUTPATIENT
Start: 2023-05-09 | End: 2023-05-11 | Stop reason: HOSPADM

## 2023-05-09 RX ORDER — HYDRALAZINE HYDROCHLORIDE 20 MG/ML
10 INJECTION INTRAMUSCULAR; INTRAVENOUS
Status: DISCONTINUED | OUTPATIENT
Start: 2023-05-09 | End: 2023-05-12

## 2023-05-09 RX ORDER — PROCHLORPERAZINE MALEATE 5 MG
10 TABLET ORAL EVERY 6 HOURS PRN
Status: DISCONTINUED | OUTPATIENT
Start: 2023-05-09 | End: 2023-05-11 | Stop reason: HOSPADM

## 2023-05-09 RX ORDER — METHYLERGONOVINE MALEATE 0.2 MG/ML
200 INJECTION INTRAVENOUS
Status: DISCONTINUED | OUTPATIENT
Start: 2023-05-09 | End: 2023-05-11 | Stop reason: HOSPADM

## 2023-05-09 RX ORDER — METOCLOPRAMIDE 10 MG/1
10 TABLET ORAL EVERY 6 HOURS PRN
Status: DISCONTINUED | OUTPATIENT
Start: 2023-05-09 | End: 2023-05-11 | Stop reason: HOSPADM

## 2023-05-09 RX ORDER — METHYLERGONOVINE MALEATE 0.2 MG/ML
200 INJECTION INTRAVENOUS
Status: CANCELLED | OUTPATIENT
Start: 2023-05-09

## 2023-05-09 RX ORDER — PROCHLORPERAZINE 25 MG
25 SUPPOSITORY, RECTAL RECTAL EVERY 12 HOURS PRN
Status: DISCONTINUED | OUTPATIENT
Start: 2023-05-09 | End: 2023-05-11 | Stop reason: HOSPADM

## 2023-05-09 RX ORDER — MISOPROSTOL 200 UG/1
800 TABLET ORAL
Status: DISCONTINUED | OUTPATIENT
Start: 2023-05-09 | End: 2023-05-11 | Stop reason: HOSPADM

## 2023-05-09 RX ORDER — OXYTOCIN/0.9 % SODIUM CHLORIDE 30/500 ML
340 PLASTIC BAG, INJECTION (ML) INTRAVENOUS CONTINUOUS PRN
Status: COMPLETED | OUTPATIENT
Start: 2023-05-09 | End: 2023-05-11

## 2023-05-09 RX ORDER — MISOPROSTOL 100 UG/1
400 TABLET ORAL
Status: CANCELLED | OUTPATIENT
Start: 2023-05-09

## 2023-05-09 RX ORDER — MISOPROSTOL 200 UG/1
800 TABLET ORAL
Status: CANCELLED | OUTPATIENT
Start: 2023-05-09

## 2023-05-09 RX ORDER — CITRIC ACID/SODIUM CITRATE 334-500MG
30 SOLUTION, ORAL ORAL
Status: CANCELLED | OUTPATIENT
Start: 2023-05-09

## 2023-05-09 ASSESSMENT — ACTIVITIES OF DAILY LIVING (ADL): ADLS_ACUITY_SCORE: 35

## 2023-05-09 NOTE — PROGRESS NOTES
Elevated blood pressure today.  This is second elevated pressure documented this pregnancy (Last documented was on 5/4). Based on this and elevated P/C ratio she now meets criteria for mild pre-eclampsia. Discussed recommendation for delivery due to pre-eclampsia is at 37 weeks.  I have scheduled her to come in this evening for cervical ripening. Discussed induction process.  No symptoms of severe pre-eclampsia at this time.  Will recheck labs this evening with admission.  CMP and CBC normal yesterday

## 2023-05-10 ENCOUNTER — ANESTHESIA (OUTPATIENT)
Dept: OBGYN | Facility: CLINIC | Age: 33
End: 2023-05-10
Payer: COMMERCIAL

## 2023-05-10 ENCOUNTER — ANESTHESIA EVENT (OUTPATIENT)
Dept: OBGYN | Facility: CLINIC | Age: 33
End: 2023-05-10
Payer: COMMERCIAL

## 2023-05-10 LAB
ALBUMIN SERPL BCG-MCNC: 3.1 G/DL (ref 3.5–5.2)
ALP SERPL-CCNC: 97 U/L (ref 35–104)
ALT SERPL W P-5'-P-CCNC: 16 U/L (ref 10–35)
ALT SERPL W P-5'-P-CCNC: 17 U/L (ref 10–35)
ALT SERPL W P-5'-P-CCNC: 18 U/L (ref 10–35)
ALT SERPL W P-5'-P-CCNC: 20 U/L (ref 10–35)
ANION GAP SERPL CALCULATED.3IONS-SCNC: 16 MMOL/L (ref 7–15)
AST SERPL W P-5'-P-CCNC: 27 U/L (ref 10–35)
AST SERPL W P-5'-P-CCNC: 28 U/L (ref 10–35)
AST SERPL W P-5'-P-CCNC: 29 U/L (ref 10–35)
AST SERPL W P-5'-P-CCNC: 30 U/L (ref 10–35)
BILIRUB SERPL-MCNC: <0.2 MG/DL
BUN SERPL-MCNC: 5.4 MG/DL (ref 6–20)
CALCIUM SERPL-MCNC: 9.6 MG/DL (ref 8.6–10)
CHLORIDE SERPL-SCNC: 105 MMOL/L (ref 98–107)
CREAT SERPL-MCNC: 0.63 MG/DL (ref 0.51–0.95)
CREAT SERPL-MCNC: 0.65 MG/DL (ref 0.51–0.95)
CREAT SERPL-MCNC: 0.68 MG/DL (ref 0.51–0.95)
CREAT SERPL-MCNC: 0.75 MG/DL (ref 0.51–0.95)
DEPRECATED HCO3 PLAS-SCNC: 17 MMOL/L (ref 22–29)
ERYTHROCYTE [DISTWIDTH] IN BLOOD BY AUTOMATED COUNT: 14.5 % (ref 10–15)
GFR SERPL CREATININE-BSD FRML MDRD: >90 ML/MIN/1.73M2
GLUCOSE SERPL-MCNC: 116 MG/DL (ref 70–99)
HCT VFR BLD AUTO: 34.6 % (ref 35–47)
HGB BLD-MCNC: 11.2 G/DL (ref 11.7–15.7)
HGB BLD-MCNC: 11.2 G/DL (ref 11.7–15.7)
HGB BLD-MCNC: 11.5 G/DL (ref 11.7–15.7)
HGB BLD-MCNC: 12 G/DL (ref 11.7–15.7)
HOLD SPECIMEN: NORMAL
MCH RBC QN AUTO: 26.7 PG (ref 26.5–33)
MCHC RBC AUTO-ENTMCNC: 32.4 G/DL (ref 31.5–36.5)
MCV RBC AUTO: 83 FL (ref 78–100)
PLATELET # BLD AUTO: 281 10E3/UL (ref 150–450)
PLATELET # BLD AUTO: 281 10E3/UL (ref 150–450)
PLATELET # BLD AUTO: 295 10E3/UL (ref 150–450)
PLATELET # BLD AUTO: 297 10E3/UL (ref 150–450)
POTASSIUM SERPL-SCNC: 3.7 MMOL/L (ref 3.4–5.3)
PROT SERPL-MCNC: 6.4 G/DL (ref 6.4–8.3)
RBC # BLD AUTO: 4.19 10E6/UL (ref 3.8–5.2)
SODIUM SERPL-SCNC: 138 MMOL/L (ref 136–145)
T PALLIDUM AB SER QL: NONREACTIVE
WBC # BLD AUTO: 9.6 10E3/UL (ref 4–11)

## 2023-05-10 PROCEDURE — 250N000011 HC RX IP 250 OP 636: Performed by: OBSTETRICS & GYNECOLOGY

## 2023-05-10 PROCEDURE — 250N000011 HC RX IP 250 OP 636

## 2023-05-10 PROCEDURE — 250N000009 HC RX 250: Performed by: OBSTETRICS & GYNECOLOGY

## 2023-05-10 PROCEDURE — 250N000013 HC RX MED GY IP 250 OP 250 PS 637: Performed by: OBSTETRICS & GYNECOLOGY

## 2023-05-10 PROCEDURE — 00HU33Z INSERTION OF INFUSION DEVICE INTO SPINAL CANAL, PERCUTANEOUS APPROACH: ICD-10-PCS | Performed by: ANESTHESIOLOGY

## 2023-05-10 PROCEDURE — 258N000003 HC RX IP 258 OP 636: Performed by: OBSTETRICS & GYNECOLOGY

## 2023-05-10 PROCEDURE — 36415 COLL VENOUS BLD VENIPUNCTURE: CPT | Performed by: OBSTETRICS & GYNECOLOGY

## 2023-05-10 PROCEDURE — 370N000003 HC ANESTHESIA WARD SERVICE: Performed by: ANESTHESIOLOGY

## 2023-05-10 PROCEDURE — 85027 COMPLETE CBC AUTOMATED: CPT | Performed by: OBSTETRICS & GYNECOLOGY

## 2023-05-10 PROCEDURE — 99221 1ST HOSP IP/OBS SF/LOW 40: CPT | Performed by: OBSTETRICS & GYNECOLOGY

## 2023-05-10 PROCEDURE — 84450 TRANSFERASE (AST) (SGOT): CPT | Performed by: OBSTETRICS & GYNECOLOGY

## 2023-05-10 PROCEDURE — 85049 AUTOMATED PLATELET COUNT: CPT | Performed by: OBSTETRICS & GYNECOLOGY

## 2023-05-10 PROCEDURE — 3E0R3BZ INTRODUCTION OF ANESTHETIC AGENT INTO SPINAL CANAL, PERCUTANEOUS APPROACH: ICD-10-PCS | Performed by: ANESTHESIOLOGY

## 2023-05-10 PROCEDURE — 10907ZC DRAINAGE OF AMNIOTIC FLUID, THERAPEUTIC FROM PRODUCTS OF CONCEPTION, VIA NATURAL OR ARTIFICIAL OPENING: ICD-10-PCS | Performed by: OBSTETRICS & GYNECOLOGY

## 2023-05-10 PROCEDURE — 85018 HEMOGLOBIN: CPT | Performed by: OBSTETRICS & GYNECOLOGY

## 2023-05-10 PROCEDURE — 3E0P7VZ INTRODUCTION OF HORMONE INTO FEMALE REPRODUCTIVE, VIA NATURAL OR ARTIFICIAL OPENING: ICD-10-PCS | Performed by: OBSTETRICS & GYNECOLOGY

## 2023-05-10 PROCEDURE — 250N000011 HC RX IP 250 OP 636: Performed by: ANESTHESIOLOGY

## 2023-05-10 PROCEDURE — 84460 ALANINE AMINO (ALT) (SGPT): CPT | Performed by: OBSTETRICS & GYNECOLOGY

## 2023-05-10 PROCEDURE — 82565 ASSAY OF CREATININE: CPT | Performed by: OBSTETRICS & GYNECOLOGY

## 2023-05-10 PROCEDURE — 120N000001 HC R&B MED SURG/OB

## 2023-05-10 RX ORDER — ROPIVACAINE HYDROCHLORIDE 2 MG/ML
10 INJECTION, SOLUTION EPIDURAL; INFILTRATION; PERINEURAL ONCE
Status: DISCONTINUED | OUTPATIENT
Start: 2023-05-10 | End: 2023-05-13 | Stop reason: HOSPADM

## 2023-05-10 RX ORDER — NALBUPHINE HYDROCHLORIDE 20 MG/ML
2.5-5 INJECTION, SOLUTION INTRAMUSCULAR; INTRAVENOUS; SUBCUTANEOUS EVERY 6 HOURS PRN
Status: DISCONTINUED | OUTPATIENT
Start: 2023-05-10 | End: 2023-05-13 | Stop reason: HOSPADM

## 2023-05-10 RX ORDER — SODIUM CHLORIDE, SODIUM LACTATE, POTASSIUM CHLORIDE, CALCIUM CHLORIDE 600; 310; 30; 20 MG/100ML; MG/100ML; MG/100ML; MG/100ML
INJECTION, SOLUTION INTRAVENOUS CONTINUOUS PRN
Status: DISCONTINUED | OUTPATIENT
Start: 2023-05-10 | End: 2023-05-11 | Stop reason: HOSPADM

## 2023-05-10 RX ORDER — EPHEDRINE SULFATE 50 MG/ML
5 INJECTION, SOLUTION INTRAMUSCULAR; INTRAVENOUS; SUBCUTANEOUS
Status: DISCONTINUED | OUTPATIENT
Start: 2023-05-10 | End: 2023-05-13 | Stop reason: HOSPADM

## 2023-05-10 RX ORDER — LIDOCAINE HYDROCHLORIDE AND EPINEPHRINE 15; 5 MG/ML; UG/ML
3 INJECTION, SOLUTION EPIDURAL
Status: DISCONTINUED | OUTPATIENT
Start: 2023-05-10 | End: 2023-05-13 | Stop reason: HOSPADM

## 2023-05-10 RX ORDER — LIDOCAINE 40 MG/G
CREAM TOPICAL
Status: DISCONTINUED | OUTPATIENT
Start: 2023-05-10 | End: 2023-05-11 | Stop reason: HOSPADM

## 2023-05-10 RX ORDER — CEFAZOLIN SODIUM 1 G/3ML
INJECTION, POWDER, FOR SOLUTION INTRAMUSCULAR; INTRAVENOUS
Status: COMPLETED
Start: 2023-05-10 | End: 2023-05-10

## 2023-05-10 RX ORDER — SODIUM CHLORIDE, SODIUM LACTATE, POTASSIUM CHLORIDE, CALCIUM CHLORIDE 600; 310; 30; 20 MG/100ML; MG/100ML; MG/100ML; MG/100ML
INJECTION, SOLUTION INTRAVENOUS CONTINUOUS
Status: DISCONTINUED | OUTPATIENT
Start: 2023-05-10 | End: 2023-05-12

## 2023-05-10 RX ORDER — FENTANYL CITRATE 50 UG/ML
INJECTION, SOLUTION INTRAMUSCULAR; INTRAVENOUS
Status: COMPLETED | OUTPATIENT
Start: 2023-05-10 | End: 2023-05-10

## 2023-05-10 RX ORDER — FENTANYL CITRATE 50 UG/ML
100 INJECTION, SOLUTION INTRAMUSCULAR; INTRAVENOUS ONCE
Status: COMPLETED | OUTPATIENT
Start: 2023-05-10 | End: 2023-05-10

## 2023-05-10 RX ORDER — ROPIVACAINE HYDROCHLORIDE 2 MG/ML
INJECTION, SOLUTION EPIDURAL; INFILTRATION; PERINEURAL
Status: COMPLETED | OUTPATIENT
Start: 2023-05-10 | End: 2023-05-10

## 2023-05-10 RX ORDER — ONDANSETRON 4 MG/1
4 TABLET, ORALLY DISINTEGRATING ORAL EVERY 6 HOURS PRN
Status: DISCONTINUED | OUTPATIENT
Start: 2023-05-10 | End: 2023-05-10

## 2023-05-10 RX ORDER — OXYTOCIN/0.9 % SODIUM CHLORIDE 30/500 ML
1-24 PLASTIC BAG, INJECTION (ML) INTRAVENOUS CONTINUOUS
Status: DISCONTINUED | OUTPATIENT
Start: 2023-05-10 | End: 2023-05-11 | Stop reason: HOSPADM

## 2023-05-10 RX ORDER — ONDANSETRON 2 MG/ML
4 INJECTION INTRAMUSCULAR; INTRAVENOUS EVERY 6 HOURS PRN
Status: DISCONTINUED | OUTPATIENT
Start: 2023-05-10 | End: 2023-05-10

## 2023-05-10 RX ADMIN — MISOPROSTOL 25 MCG: 100 TABLET ORAL at 04:03

## 2023-05-10 RX ADMIN — MISOPROSTOL 25 MCG: 100 TABLET ORAL at 00:05

## 2023-05-10 RX ADMIN — FENTANYL CITRATE 100 MCG: 50 INJECTION, SOLUTION INTRAMUSCULAR; INTRAVENOUS at 09:35

## 2023-05-10 RX ADMIN — HYDROXYZINE HYDROCHLORIDE 50 MG: 25 TABLET, FILM COATED ORAL at 00:08

## 2023-05-10 RX ADMIN — FENTANYL CITRATE 100 MCG: 50 INJECTION, SOLUTION INTRAMUSCULAR; INTRAVENOUS at 11:19

## 2023-05-10 RX ADMIN — Medication: at 19:13

## 2023-05-10 RX ADMIN — SODIUM CHLORIDE, POTASSIUM CHLORIDE, SODIUM LACTATE AND CALCIUM CHLORIDE 500 ML: 600; 310; 30; 20 INJECTION, SOLUTION INTRAVENOUS at 10:45

## 2023-05-10 RX ADMIN — ROPIVACAINE HYDROCHLORIDE 8 ML: 2 INJECTION, SOLUTION EPIDURAL; INFILTRATION at 11:15

## 2023-05-10 RX ADMIN — FENTANYL CITRATE 100 MCG: 50 INJECTION, SOLUTION INTRAMUSCULAR; INTRAVENOUS at 10:31

## 2023-05-10 RX ADMIN — CEFAZOLIN 1 G: 1 INJECTION, POWDER, FOR SOLUTION INTRAMUSCULAR; INTRAVENOUS at 20:02

## 2023-05-10 RX ADMIN — CEFAZOLIN SODIUM 1 G: 1 INJECTION, POWDER, FOR SOLUTION INTRAMUSCULAR; INTRAVENOUS at 20:02

## 2023-05-10 RX ADMIN — FENTANYL CITRATE 100 MCG: 50 INJECTION, SOLUTION INTRAMUSCULAR; INTRAVENOUS at 11:15

## 2023-05-10 RX ADMIN — FENTANYL CITRATE 100 MCG: 50 INJECTION, SOLUTION INTRAMUSCULAR; INTRAVENOUS at 08:33

## 2023-05-10 RX ADMIN — FENTANYL CITRATE 100 MCG: 50 INJECTION, SOLUTION INTRAMUSCULAR; INTRAVENOUS at 06:35

## 2023-05-10 RX ADMIN — CEFAZOLIN SODIUM 2 G: 2 INJECTION, SOLUTION INTRAVENOUS at 12:01

## 2023-05-10 RX ADMIN — MISOPROSTOL 25 MCG: 100 TABLET ORAL at 08:27

## 2023-05-10 RX ADMIN — SODIUM CHLORIDE, POTASSIUM CHLORIDE, SODIUM LACTATE AND CALCIUM CHLORIDE: 600; 310; 30; 20 INJECTION, SOLUTION INTRAVENOUS at 17:42

## 2023-05-10 RX ADMIN — Medication: at 11:20

## 2023-05-10 RX ADMIN — Medication 1 MILLI-UNITS/MIN: at 22:25

## 2023-05-10 RX ADMIN — MISOPROSTOL 25 MCG: 100 TABLET ORAL at 12:42

## 2023-05-10 ASSESSMENT — ACTIVITIES OF DAILY LIVING (ADL)
ADLS_ACUITY_SCORE: 20
ADLS_ACUITY_SCORE: 25
ADLS_ACUITY_SCORE: 20
ADLS_ACUITY_SCORE: 25

## 2023-05-10 NOTE — ANESTHESIA PROCEDURE NOTES
"Epidural catheter Procedure Note    Pre-Procedure   Staff -        Anesthesiologist:  Richard Mora DO       Performed By: anesthesiologist       Location: OB       Pre-Anesthestic Checklist: patient identified, IV checked, risks and benefits discussed, informed consent, monitors and equipment checked, pre-op evaluation and at physician/surgeon's request  Timeout:       Correct Patient: Yes        Correct Procedure: Yes        Correct Site: Yes        Correct Position: Yes   Procedure Documentation  Procedure: epidural catheter       Patient Position: sitting       Patient Prep/Sterile Barriers: sterile gloves, mask, patient draped       Skin prep: Betadine       Local skin infiltrated with 3 mL of 1% lidocaine.        Insertion Site: L4-5. (midline approach).       Technique: LORT saline        RAMESH at 7 cm.       Needle Type: Lennon Linesy needle       Needle Gauge: 17.        Needle Length (Inches): 3.5        Catheter: 19 G.          Catheter threaded easily.         5 cm epidural space.         Threaded 12 cm at skin.         # of attempts: 1 and  # of redirects:  1    Assessment/Narrative         Paresthesias: No.       Test dose of 5 mL lidocaine 1.5% w/ 1:200,000 epinephrine at.         Test dose negative, 3 minutes after injection, for signs of intravascular, subdural, or intrathecal injection.       Insertion/Infusion Method: LORT saline       Aspiration negative for Heme or CSF via Epidural Catheter.    Medication(s) Administered   0.2% Ropivacaine (Epidural) - EPIDURAL   8 mL - 5/10/2023 11:15:00 AM  Fentanyl PF (Epidural) - EPIDURAL   100 mcg - 5/10/2023 11:15:00 AM    FOR [facility] ([facility]) ONLY:   Pain Team Contact information: please page the Pain Team Via [employer]. Search \"Pain\". During daytime hours, please page the attending first. At night please page the resident first.      "

## 2023-05-10 NOTE — PLAN OF CARE
Pt arrived to room 219. Oriented to room and call light system. External US and toco applied with patient consent, FHT good. IV placed in L hand, SL. Discussed plan for cervical ripening. Pt would like an epidural as labor progresses.

## 2023-05-10 NOTE — PROGRESS NOTES
Labor Progress Note    S: Chart check    patient eating something now, then will want an epidural. Has used fentanyl. Is aware of early cervical dilation and potential for prolongation of labor with epidural at this dilation.    O:   Vitals:    05/10/23 1157 05/10/23 1200 05/10/23 1215 05/10/23 1230   BP: 129/69 129/69 111/56 103/53   BP Location:       Patient Position:       Cuff Size:       Pulse:       Resp:       Temp:       TempSrc:       SpO2: 98% 98% 97% 96%     /53   Pulse 115   Temp 99.1  F (37.3  C) (Oral)   Resp 18   LMP 08/24/2022   SpO2 96%     Gen: AOx3, NAD    SVE: 1cm    FHT: 140 mod variability, 10x10 accels  [place]: q 2-4min      A/P: 33yo G1 @ 37wk induction for preeclampsia w/o severe features  -PreE. Las have been wnl. BPs normal range  Cont to monitor closely  -Has received 3rd dose cytotec at 0800.   -Pain control per patient preference      Rocío Michael Masters, DO  May 10, 2023

## 2023-05-10 NOTE — PLAN OF CARE
VSS on RA. Second dose of vaginal cytotec placed at 0400. Increasing discomfort, rates pain 3/10 with contractions. Fentanyl given x1. Last SVE 0/0/-3. FHT WDL. Up independently, voiding well. Denies needs at this time.  Nicholas at bedside, will continue with POC.

## 2023-05-10 NOTE — PROVIDER NOTIFICATION
05/09/23 2346   Provider Notification   Provider Name/Title Dr DARSHANA Vazquez   Method of Notification Electronic Page   Request Evaluate - Remote   Notification Reason Patient Arrived;Lab/Diagnostic Study     Pt arrived, SVE closed and high. Do you want pre-e labs?

## 2023-05-10 NOTE — ANESTHESIA PREPROCEDURE EVALUATION
Anesthesia Pre-Procedure Evaluation    Patient: Rakel Shaw   MRN: [id number] : 1990        Procedure :           Past Medical History:   Diagnosis Date     Anxiety      NO ACTIVE PROBLEMS      PCOS (polycystic ovarian syndrome)      Uncomplicated asthma     as child      Past Surgical History:   Procedure Laterality Date     HEAD & NECK SURGERY      teeth extraction younger     NO HISTORY OF SURGERY        Allergies   Allergen Reactions     Seafood Other (See Comments), Difficulty breathing and Rash     Fish/ Shrimp     Morphine Nausea and Vomiting     Pcn [Penicillins] Hives      Social History     Tobacco Use     Smoking status: Never     Smokeless tobacco: Never   Vaping Use     Vaping status: Never Used   Substance Use Topics     Alcohol use: No      Wt Readings from Last 1 Encounters:   23 111.9 kg (246 lb 12.8 oz)        Anesthesia Evaluation            ROS/MED HX  ENT/Pulmonary:     (+) asthma  (-) sleep apnea   Neurologic:  - neg neurologic ROS     Cardiovascular:     (+) hypertension--PIH and BP Meds ---    METS/Exercise Tolerance: >4 METS    Hematologic:     (+) no anticoagulation therapy, no coagulopathy,     Musculoskeletal:       GI/Hepatic:    (-) GERD   Renal/Genitourinary:       Endo:     (+) Obesity,     Psychiatric/Substance Use:       Infectious Disease:       Malignancy:       Other:            Physical Exam    Airway        Mallampati: II   TM distance: > 3 FB   Neck ROM: full     Respiratory Devices and Support         Dental  no notable dental history         Cardiovascular   cardiovascular exam normal          Pulmonary   pulmonary exam normal                OUTSIDE LABS:  CBC:   Lab Results   Component Value Date    WBC 9.6 05/10/2023    WBC 10.9 2023    HGB 11.2 (L) 05/10/2023    HGB 11.2 (L) 05/10/2023    HCT 34.6 (L) 05/10/2023    HCT 35.9 2023     05/10/2023     05/10/2023     BMP:   Lab Results   Component Value Date     2023      (L) 05/08/2023    POTASSIUM 3.7 05/09/2023    POTASSIUM 4.0 05/08/2023    CHLORIDE 105 05/09/2023    CHLORIDE 103 05/08/2023    CO2 17 (L) 05/09/2023    CO2 20 (L) 05/08/2023    BUN 5.4 (L) 05/09/2023    BUN 8.2 05/08/2023    CR 0.63 05/10/2023    CR 0.65 05/09/2023     (H) 05/09/2023     (H) 05/08/2023     COAGS: No results found for: PTT, INR, FIBR  POC:   Lab Results   Component Value Date    HCGS Negative 07/08/2022     HEPATIC:   Lab Results   Component Value Date    ALBUMIN 3.1 (L) 05/09/2023    PROTTOTAL 6.4 05/09/2023    ALT 18 05/10/2023    AST 28 05/10/2023    ALKPHOS 97 05/09/2023    BILITOTAL <0.2 05/09/2023     OTHER:   Lab Results   Component Value Date    A1C 5.2 09/29/2020    AYLEEN 9.6 05/09/2023    MAG 2.2 07/17/2021    TSH 0.44 07/17/2021    CRP <2.9 06/15/2019    SED 9 06/15/2019       Anesthesia Plan    ASA Status:  3   NPO Status:  NPO Appropriate    Anesthesia Type: Epidural.              Consents    Anesthesia Plan(s) and associated risks, benefits, and realistic alternatives discussed. Questions answered and patient/representative(s) expressed understanding.    - Discussed:     - Discussed with:  Patient         Postoperative Care            Comments:    Other Comments: Orders to manage the epidural infusion have been entered, and through coordination with the nurse, we will continute to manage and monitor the patient's labor epidural.  We will continuously be available to adjust as needed thruout the entire L&D process.             Richard Mora, DO, DO

## 2023-05-10 NOTE — H&P
[facility]    History and Physical  Obstetrics and Gynecology     Date of Admission:  2023    Assessment & Plan   Rakel Shaw is a 32 year old female who presents for induction of labor for mild pre-eclampsia  ASSESSMENT:   IUP @ 37w0d for induction of labor. .  NST reactive.  Category  I    PLAN:   Admit - see IP orders  Pre-eclampsia labs collected. Ordered for q8hrs  Closely monitor BPs  Cervical ripening with vaginal misoprostol    Sara Vazquez MD    History of Present Illness   Rakel Shaw is a 32 year old female  37w0d  Estimated Date of Delivery: 23 is calculated from Patient's last menstrual period was 2022. [facility]  for induction of labor.  Indication Mild pre-eclampsia.    PRENATAL COURSE  Prenatal course was complicated by elevated BMI and mild pre-eclampsia      Recent Labs   Lab Test 23  2329   AS Negative     Rhogam not indicated   Recent Labs   Lab Test 22  1653 22  1652   HEPBANG  --  Nonreactive   HIAGAB  --  Nonreactive   RUQIGG Positive  --        Past Medical History    I have reviewed this patient's medical history and updated it with pertinent information if needed.   Past Medical History:   Diagnosis Date     Anxiety      NO ACTIVE PROBLEMS      PCOS (polycystic ovarian syndrome)      Uncomplicated asthma     as child       Past Surgical History   I have reviewed this patient's surgical history and updated it with pertinent information if needed.  Past Surgical History:   Procedure Laterality Date     HEAD & NECK SURGERY      teeth extraction younger     NO HISTORY OF SURGERY         Prior to Admission Medications   Prior to Admission Medications   Prescriptions Last Dose Informant Patient Reported? Taking?   Cholecalciferol (VITAMIN D3) 50 MCG ( UT) CAPS 2023  Yes Yes   Sig: Take 1 capsule by mouth daily   Prenatal Vit-Fe Fumarate-FA (PRENATAL VITAMIN PO) 2023  Yes Yes   Sig: Take 1  tablet by mouth daily   acetaminophen (TYLENOL) 500 MG tablet   Yes No   Sig: Take 1,000 mg by mouth every 6 hours as needed for mild pain   ferrous sulfate (FEROSUL) 325 (65 Fe) MG tablet 5/8/2023  Yes Yes   Sig: Take 325 mg by mouth daily (with breakfast) Liquid   omeprazole (PRILOSEC OTC) 20 MG EC tablet Past Week  Yes Yes   Sig: Take 20 mg by mouth daily      Facility-Administered Medications: None     Allergies   Allergies   Allergen Reactions     Seafood Other (See Comments), Difficulty breathing and Rash     Fish/ Shrimp     Morphine Nausea and Vomiting     Pcn [Penicillins] Hives       Social History   I have reviewed this patient's social history and updated it with pertinent information if needed. Rakel Shaw  reports that she has never smoked. She has never used smokeless tobacco. She reports that she does not drink alcohol and does not use drugs.    Family History   I have reviewed this patient's family history and updated it with pertinent information if needed.   Family History   Problem Relation Age of Onset     Hypertension Mother      Hyperlipidemia Father      Coronary Artery Disease Father      Diabetes Father      Thyroid Disease Sister      Diabetes Sister      Anesthesia Reaction Sister        Immunization History   Immunizations are up to date    Physical Exam   Temp: 98.1  F (36.7  C) Temp src: Temporal BP: 130/72 Pulse: 115   Resp: 18   O2 Device: None (Room air)    Vital Signs with Ranges  Temp:  [98.1  F (36.7  C)-98.4  F (36.9  C)] 98.1  F (36.7  C)  Pulse:  [115] 115  Resp:  [16-18] 18  BP: (114-156)/(59-82) 130/72    Abdomen: gravid, single vertex fetus, non-tender, EFW 7 lbs 0  Cervical Exam: closed/ 50/ Posterior/ average/ -3     Fetal Heart Tones on admission: 140 baseline, moderate variablility, + accels, no decels and Category I  TOCO on admission: irregular    Constitutional: healthy, alert and active   Respiratory: clear to auscultation  Cardiovascular: normal S1 and  S2  Skin/Extremites: normal skin color, texture, turgor and 1+ edema

## 2023-05-11 LAB
ALT SERPL W P-5'-P-CCNC: 18 U/L (ref 10–35)
ALT SERPL W P-5'-P-CCNC: 20 U/L (ref 10–35)
AST SERPL W P-5'-P-CCNC: 46 U/L (ref 10–35)
AST SERPL W P-5'-P-CCNC: 68 U/L (ref 10–35)
CREAT SERPL-MCNC: 1.03 MG/DL (ref 0.51–0.95)
CREAT SERPL-MCNC: 1.12 MG/DL (ref 0.51–0.95)
GFR SERPL CREATININE-BSD FRML MDRD: 67 ML/MIN/1.73M2
GFR SERPL CREATININE-BSD FRML MDRD: 74 ML/MIN/1.73M2
HGB BLD-MCNC: 10.9 G/DL (ref 11.7–15.7)
HGB BLD-MCNC: 12.6 G/DL (ref 11.7–15.7)
HOLD SPECIMEN: NORMAL
PLATELET # BLD AUTO: 307 10E3/UL (ref 150–450)
PLATELET # BLD AUTO: 329 10E3/UL (ref 150–450)

## 2023-05-11 PROCEDURE — 85018 HEMOGLOBIN: CPT | Performed by: OBSTETRICS & GYNECOLOGY

## 2023-05-11 PROCEDURE — 120N000012 HC R&B POSTPARTUM

## 2023-05-11 PROCEDURE — 999N000016 HC STATISTIC ATTENDANCE AT DELIVERY

## 2023-05-11 PROCEDURE — 258N000003 HC RX IP 258 OP 636: Performed by: OBSTETRICS & GYNECOLOGY

## 2023-05-11 PROCEDURE — 250N000009 HC RX 250: Performed by: OBSTETRICS & GYNECOLOGY

## 2023-05-11 PROCEDURE — 36415 COLL VENOUS BLD VENIPUNCTURE: CPT | Performed by: OBSTETRICS & GYNECOLOGY

## 2023-05-11 PROCEDURE — 722N000001 HC LABOR CARE VAGINAL DELIVERY SINGLE

## 2023-05-11 PROCEDURE — 59400 OBSTETRICAL CARE: CPT | Performed by: OBSTETRICS & GYNECOLOGY

## 2023-05-11 PROCEDURE — 250N000011 HC RX IP 250 OP 636

## 2023-05-11 PROCEDURE — 82565 ASSAY OF CREATININE: CPT | Performed by: OBSTETRICS & GYNECOLOGY

## 2023-05-11 PROCEDURE — 250N000011 HC RX IP 250 OP 636: Performed by: ANESTHESIOLOGY

## 2023-05-11 PROCEDURE — 0DQR0ZZ REPAIR ANAL SPHINCTER, OPEN APPROACH: ICD-10-PCS | Performed by: OBSTETRICS & GYNECOLOGY

## 2023-05-11 PROCEDURE — 250N000013 HC RX MED GY IP 250 OP 250 PS 637: Performed by: OBSTETRICS & GYNECOLOGY

## 2023-05-11 PROCEDURE — 85049 AUTOMATED PLATELET COUNT: CPT | Performed by: OBSTETRICS & GYNECOLOGY

## 2023-05-11 PROCEDURE — 84450 TRANSFERASE (AST) (SGOT): CPT | Performed by: OBSTETRICS & GYNECOLOGY

## 2023-05-11 PROCEDURE — 84460 ALANINE AMINO (ALT) (SGPT): CPT | Performed by: OBSTETRICS & GYNECOLOGY

## 2023-05-11 RX ORDER — MISOPROSTOL 200 UG/1
800 TABLET ORAL
Status: DISCONTINUED | OUTPATIENT
Start: 2023-05-11 | End: 2023-05-13 | Stop reason: HOSPADM

## 2023-05-11 RX ORDER — CARBOPROST TROMETHAMINE 250 UG/ML
250 INJECTION, SOLUTION INTRAMUSCULAR
Status: DISCONTINUED | OUTPATIENT
Start: 2023-05-11 | End: 2023-05-13 | Stop reason: HOSPADM

## 2023-05-11 RX ORDER — OXYCODONE HYDROCHLORIDE 5 MG/1
5 TABLET ORAL EVERY 4 HOURS PRN
Status: DISCONTINUED | OUTPATIENT
Start: 2023-05-11 | End: 2023-05-13 | Stop reason: HOSPADM

## 2023-05-11 RX ORDER — NALOXONE HYDROCHLORIDE 0.4 MG/ML
0.4 INJECTION, SOLUTION INTRAMUSCULAR; INTRAVENOUS; SUBCUTANEOUS
Status: DISCONTINUED | OUTPATIENT
Start: 2023-05-11 | End: 2023-05-13 | Stop reason: HOSPADM

## 2023-05-11 RX ORDER — HYDROCORTISONE 25 MG/G
CREAM TOPICAL 3 TIMES DAILY PRN
Status: DISCONTINUED | OUTPATIENT
Start: 2023-05-11 | End: 2023-05-13 | Stop reason: HOSPADM

## 2023-05-11 RX ORDER — TRANEXAMIC ACID 10 MG/ML
1 INJECTION, SOLUTION INTRAVENOUS EVERY 30 MIN PRN
Status: DISCONTINUED | OUTPATIENT
Start: 2023-05-11 | End: 2023-05-13 | Stop reason: HOSPADM

## 2023-05-11 RX ORDER — METHYLERGONOVINE MALEATE 0.2 MG/ML
200 INJECTION INTRAVENOUS
Status: DISCONTINUED | OUTPATIENT
Start: 2023-05-11 | End: 2023-05-13 | Stop reason: HOSPADM

## 2023-05-11 RX ORDER — MISOPROSTOL 200 UG/1
400 TABLET ORAL
Status: DISCONTINUED | OUTPATIENT
Start: 2023-05-11 | End: 2023-05-13 | Stop reason: HOSPADM

## 2023-05-11 RX ORDER — MODIFIED LANOLIN
OINTMENT (GRAM) TOPICAL
Status: DISCONTINUED | OUTPATIENT
Start: 2023-05-11 | End: 2023-05-13 | Stop reason: HOSPADM

## 2023-05-11 RX ORDER — CEFAZOLIN SODIUM 1 G/3ML
INJECTION, POWDER, FOR SOLUTION INTRAMUSCULAR; INTRAVENOUS
Status: COMPLETED
Start: 2023-05-11 | End: 2023-05-11

## 2023-05-11 RX ORDER — NALOXONE HYDROCHLORIDE 0.4 MG/ML
0.2 INJECTION, SOLUTION INTRAMUSCULAR; INTRAVENOUS; SUBCUTANEOUS
Status: DISCONTINUED | OUTPATIENT
Start: 2023-05-11 | End: 2023-05-13 | Stop reason: HOSPADM

## 2023-05-11 RX ORDER — OMEPRAZOLE 20 MG/1
20 TABLET, DELAYED RELEASE ORAL DAILY
Status: CANCELLED | OUTPATIENT
Start: 2023-05-11

## 2023-05-11 RX ORDER — IBUPROFEN 400 MG/1
800 TABLET, FILM COATED ORAL EVERY 6 HOURS PRN
Status: DISCONTINUED | OUTPATIENT
Start: 2023-05-11 | End: 2023-05-13 | Stop reason: HOSPADM

## 2023-05-11 RX ORDER — LIDOCAINE HYDROCHLORIDE 10 MG/ML
INJECTION, SOLUTION EPIDURAL; INFILTRATION; INTRACAUDAL; PERINEURAL
Status: DISCONTINUED
Start: 2023-05-11 | End: 2023-05-11 | Stop reason: HOSPADM

## 2023-05-11 RX ORDER — DOCUSATE SODIUM 100 MG/1
100 CAPSULE, LIQUID FILLED ORAL 2 TIMES DAILY
Status: DISCONTINUED | OUTPATIENT
Start: 2023-05-11 | End: 2023-05-13 | Stop reason: HOSPADM

## 2023-05-11 RX ORDER — ACETAMINOPHEN 325 MG/1
650 TABLET ORAL EVERY 4 HOURS PRN
Status: DISCONTINUED | OUTPATIENT
Start: 2023-05-11 | End: 2023-05-13 | Stop reason: HOSPADM

## 2023-05-11 RX ORDER — OXYTOCIN/0.9 % SODIUM CHLORIDE 30/500 ML
340 PLASTIC BAG, INJECTION (ML) INTRAVENOUS CONTINUOUS PRN
Status: DISCONTINUED | OUTPATIENT
Start: 2023-05-11 | End: 2023-05-13 | Stop reason: HOSPADM

## 2023-05-11 RX ORDER — OXYTOCIN 10 [USP'U]/ML
10 INJECTION, SOLUTION INTRAMUSCULAR; INTRAVENOUS
Status: DISCONTINUED | OUTPATIENT
Start: 2023-05-11 | End: 2023-05-13 | Stop reason: HOSPADM

## 2023-05-11 RX ADMIN — SODIUM CHLORIDE, POTASSIUM CHLORIDE, SODIUM LACTATE AND CALCIUM CHLORIDE: 600; 310; 30; 20 INJECTION, SOLUTION INTRAVENOUS at 00:03

## 2023-05-11 RX ADMIN — IBUPROFEN 800 MG: 400 TABLET ORAL at 10:33

## 2023-05-11 RX ADMIN — IBUPROFEN 800 MG: 400 TABLET ORAL at 16:46

## 2023-05-11 RX ADMIN — ACETAMINOPHEN 650 MG: 325 TABLET ORAL at 22:59

## 2023-05-11 RX ADMIN — DOCUSATE SODIUM 100 MG: 100 CAPSULE, LIQUID FILLED ORAL at 22:59

## 2023-05-11 RX ADMIN — IBUPROFEN 800 MG: 400 TABLET ORAL at 22:59

## 2023-05-11 RX ADMIN — Medication: at 02:08

## 2023-05-11 RX ADMIN — ACETAMINOPHEN 650 MG: 325 TABLET ORAL at 14:51

## 2023-05-11 RX ADMIN — ACETAMINOPHEN 650 MG: 325 TABLET ORAL at 18:47

## 2023-05-11 RX ADMIN — CEFAZOLIN SODIUM 1 G: 1 INJECTION, POWDER, FOR SOLUTION INTRAMUSCULAR; INTRAVENOUS at 04:00

## 2023-05-11 RX ADMIN — CEFAZOLIN 1 G: 1 INJECTION, POWDER, FOR SOLUTION INTRAMUSCULAR; INTRAVENOUS at 04:00

## 2023-05-11 RX ADMIN — ACETAMINOPHEN 650 MG: 325 TABLET ORAL at 10:33

## 2023-05-11 RX ADMIN — Medication 340 ML/HR: at 09:10

## 2023-05-11 ASSESSMENT — ACTIVITIES OF DAILY LIVING (ADL)
ADLS_ACUITY_SCORE: 26
ADLS_ACUITY_SCORE: 23
ADLS_ACUITY_SCORE: 26
ADLS_ACUITY_SCORE: 26
ADLS_ACUITY_SCORE: 23
ADLS_ACUITY_SCORE: 26
ADLS_ACUITY_SCORE: 26
ADLS_ACUITY_SCORE: 23
ADLS_ACUITY_SCORE: 26

## 2023-05-11 NOTE — PLAN OF CARE
Rakel and her  daughter were transferred to room 430 via wheelchair. Report was given to Jayne TRAN RN who will now assume care.

## 2023-05-11 NOTE — PROVIDER NOTIFICATION
05/10/23 2213   Provider Notification   Provider Name/Title Dr Alatorre   Method of Notification Electronic Page   Request Evaluate - Remote   Notification Reason Labor Status;SVE     SVE unchanged, 4/60/-2. Cx spreading out slightly. Do you want to start pit?

## 2023-05-11 NOTE — PROGRESS NOTES
Labor Progress Note    S: Started pushing 5:25am  patient without questions or copmplaints    O:   Vitals:    05/11/23 0400 05/11/23 0500 05/11/23 0610 05/11/23 0700   BP: 101/68 121/72  122/67   BP Location: Right arm Right arm  Right arm   Patient Position: Left side Left side  Supine   Cuff Size:  Adult Large  Adult Large   Pulse: 106 109  119   Resp: 16 16  20   Temp: 99.8  F (37.7  C) (!) 100.6  F (38.1  C) 100  F (37.8  C) 99.1  F (37.3  C)   TempSrc: Temporal Temporal  Temporal   SpO2: 99% 95% 98%      /67 (BP Location: Right arm, Patient Position: Supine, Cuff Size: Adult Large)   Pulse 119   Temp 99.1  F (37.3  C) (Temporal)   Resp 20   LMP 08/24/2022   SpO2 98%     Gen: AOx3, NAD    SVE: 10/100/1+, mod caput -> 2+ station    FHT: 175 accels min-mod variability, +scalp stim. Loss of capture, some variables with pushing. Now with further pushing and descent  mod variability +accels, loss of capture with pushing  [place]: q 2-2.5min    Pitocin: 4mU    A/P: 33yo G1 @ 37+1wk induction of labor for preE w/o severe features. BPs have been normal to occ mild range. Nl labs, last done last evening.   Currently pushing just over 2hrs with some descent, reassuring fetal status. Will continue to push. Sign out to Dr. Franks given.  Discussed plan of care with patient and family, opportunity for questions and these have been answered to their satisfaction.       Rocío Michael Masters, DO  May 11, 2023  7:48 AM

## 2023-05-11 NOTE — PROVIDER NOTIFICATION
05/11/23 0330   Provider Notification   Provider Name/Title Dr Alatorre   Method of Notification Electronic Page   Request Evaluate - Remote   Notification Reason Decels;Variability Change;Labor Status;Status Update;SVE     FYI: SVE 8-9/90/-1. Cx 1-3 min apart. FHT 150s, min/mod variability, + accels, intermittent early and late decels.

## 2023-05-11 NOTE — PROVIDER NOTIFICATION
05/11/23 0500   Provider Notification   Provider Name/Title Dr Alatorre   Method of Notification Electronic Page   Request Evaluate in Person   Notification Reason SVE     SVE complete +1, plan to start pushing!

## 2023-05-11 NOTE — PLAN OF CARE
Provider notification:   Provider: Dr Alatorre was notified at 0509 regarding:  initiation of second stage of labor.    Second Stage of Labor Algorithm    Passenger:  See flowsheets for fetal heart rate tracing data.   EFM interpretation suggests: absence of concern for metabolic acidemia due to: moderate variability. EFM suggests no concern for interruption of the oxygen pathway..    Position  Fetal position:unable to determine  Fetal station: +1     Power  See flowsheets for uterine activity data.  Contraction frequency: every 1-3 minutes.   Contraction strength: moderate by palpation.      Psyche  Epidural / ITN: Yes  Maternal pain management: coping  Urge to push: present    Plan  After discussion with provider:  Interventions to optimize second stage:active pushing  Next notification: Will notify with an update in  minutes    ..

## 2023-05-11 NOTE — PLAN OF CARE
I assumed care of this patient. Continuing to push. Dr Alatorre  Here to see patient. Dr Franks will now be following.  0800- Closed knee pushing implemented.   0830- . Patient exhausted. Dr Franks was paged to come assess.   0840- Dr Alatorre here for delivery. Will use vacuum extractor.  09- Delivery of viable female infant. Vacuum used x's one pull. Delivery team in attendance. Apgars 7 and 9.

## 2023-05-11 NOTE — PROGRESS NOTES
Labor Progress Note    S: Comfy with epidural. No ha/vision changes    Due for cytotec at 4:45 but held, segura 7    O:   Vitals:    05/10/23 1815 05/10/23 1830 05/10/23 1845 05/10/23 1900   BP: 133/61 128/60 120/56 136/78   BP Location:       Patient Position:       Cuff Size:       Pulse:       Resp:       Temp: 98.9  F (37.2  C)      TempSrc: Oral      SpO2: 97% 97% 97% 98%     /78   Pulse 115   Temp 98.9  F (37.2  C) (Oral)   Resp 18   LMP 08/24/2022   SpO2 98%     Gen: AOx3, NAD    SVE: 4/70/-2, very well applied, mid position  AROM clear fluid for augmentation/iol    FHT: 145 cat 1  [place]: q 1-4min      A/P: 33yo G1 @ 37wk induction for preeclampsia w/o severe features  -PreE. Las have been wnl. BPs normal range. No symptoms  Cont to monitor closely  -Reassuring fetal status.  -AROm clear fluid for augmentation.   -Questions answered. PLan of care discussed.     Rocío Michael Masters, DO

## 2023-05-11 NOTE — PROVIDER NOTIFICATION
05/11/23 0640   Provider Notification   Provider Name/Title Dr Alatorre   Method of Notification Phone   Request Evaluate - Remote   Notification Reason Decels;Fetal Baseline Change;Variability Change;Labor Status;Status Update     Update to Dr Alatorre to include +accels, + intermittent late/ variable decels while pushing. +min-mod variability. FHR baseline 175 for past ~20 minutes. Highest temporal temperature tonight was 100.7, most recent was 100. Have been pushing for 1 hour without change in station so far. Dr Franks will take over this AM.

## 2023-05-11 NOTE — L&D DELIVERY NOTE
OB Vaginal Delivery Note    Rakel Shaw MRN# [id number]   Age: 32 year old YOB: 1990       GA: 37w1d  GP:   Admission diagnosis:  Preeclampisa w/o severe features, induction of labor, maternal obesity  Labor Complications: Preeclampsia/Hypertension , Shoulder dystocia <30sec  Delivery QBL: 344 mL  Delivery Type: Vaginal, Vacuum (Extractor)    Procedures: VAVD for maternal exhaustion/request, repair complete 3rd degree laceration  ROM to Delivery Time: (Delivered) Hours: 14 Minutes: 6   [place] Weight: 3.32 kg (7 lb 5.1 oz)    1 Minute 5 Minute 10 Minute   Apgar Totals: 7   9             Delivery Details:  Rakel Shaw, a 32 year old  female delivered a viable infant with apgars of 7  and 9 . Patient was fully dilated   and pushing after 1  hours 55  minutes in active labor.   Called by RN for evaluation for vacuum delivery as patient was exhausted. Discussed with Rakel ability to assist her delivery with vacuum. Discussed the risks and benefits, including fetal brain hemorrhage. Encouraged her that she would still need to provide strong pushes. She agreed and preparations made for vacuum delivery.   Her bladder had been emptying with pushing. Catheterization of bladder revealed no urine. Epidural anesthesia working. NICU team present. Fetal position verified by vaginal check. With maternal pushing and fetus at 2+ station, vacuum applied, and in coordination with maternal pushing 2 pulls applied to bring infant to . No pop-offs. Further pushing encouraged while vacuum removed. Infant delivered in vertex    occiput  anterior  position. Dystocia identified and anterior shoulder identified and RN instructed on suprapubic pressure as patient was in [place]. With  right hand posterior (left) shoulder was not lodged, anterior shoulder was lodged behind the pubic bone, was adducted and shoulders delivered, followed by remaining infant. Dystocia time less than 30 sec. Infant  placed on maternal abdomen. The cord was clamped, cut in delayed fashion and 3 vessels  were noted. Cord blood was obtained in routine fashion with the following disposition: lab .  Cord complications: none   Placenta delivered at 2023  9:09 AM . Placental disposition was Hospital disposal . Fundal massage performed and fundus found to be firm.     Episiotomy: none    Perineum, vagina, cervix were inspected, and the following lacerations were noted:   Perineal lacerations: 3rd   periurethral laceration: bilateral . Any lacerations were repaired in the usual fashion using 2-0 vicryl. Periurethral lacerations hemostatic with silver nitrate and pressure.     Excellent hemostasis was noted. Needle count correct. Infant and patient in delivery room in good and stable condition.        Festus Shaw [[id number]]    Labor Event Times    Latent labor onset date/time: 5/10/2023 1100    Active labor onset date: 23 Onset time:  3:10 AM CDT   Dilation complete date: 23 Complete time:  5:05 AM   Start pushing date/time: 2023 0525      Labor Length    1st Stage (hrs): 1 (min): 55   2nd Stage (hrs): 4 (min): 0   3rd Stage (hrs): 0 (min): 4      Labor Events     labor?: No   steroids: None  Labor Type: Induction/Cervical ripening, AROM  Predominate monitoring during 1st stage: continuous electronic fetal monitoring     Antibiotics received during labor?: Yes  Reason for Antibiotics: GBS  Antibiotics received for GBS: Cefazolin  Antibiotics Given (GBS): Greater than 4 hours prior to delivery     Rupture identifier: Sac 1  Rupture date/time: 5/10/23 1859   Rupture type: Artificial Rupture of Membranes  Fluid color: Clear  Fluid odor: Normal     Induction: Misoprostol  Induction date/time:      Cervical ripening date/time: 5/10/23 0000    Indications for induction: Mild Preeclampsia     Augmentation: Oxytocin, AROM     Delivery/Placenta Date and Time    Delivery Date: 23 Delivery Time:   "9:05 AM   Placenta Date/Time: 2023  9:09 AM  Oxytocin given at the time of delivery: after delivery of placenta          Vaginal Counts     Initial count performed by 2 team members:  Two Team Members   Dr Landry Bello RN       Needles Suture Needles Sponges (RETIRED) Instruments   Initial counts 2  5    Added to count  2 5    Relief counts       Final counts 2 2 10          Placed during labor Accounted for at the end of labor   FSE     IUPC     Cervidil                Final count performed by 2 team members:  Two Team Members   Dr Landry Bello RN      Final count correct?: Yes     Apgars    Living status: Living   1 Minute 5 Minute 10 Minute 15 Minute 20 Minute   Skin color: 1  1       Heart rate: 2  2       Reflex irritability: 1  2       Muscle tone: 1  2       Respiratory effort: 2  2       Total: 7  9          Cord    Vessels: 3 Vessels    Cord Complications: None   Cord Blood Disposition: Lab      Gases Sent?: No      Delayed cord clamping?: Yes      Cord Clamping Delay (seconds):  seconds      Stem cell collection?: No                      Resuscitation    Methods: Oximetry   Care at Delivery: NNP and delivery team present for vacuum delivery of female infant. Infant was dried and stimulated on mother's abd, spontaneous cry noted with decreased tone and HR of 102. Infant taken to warmer, oximiter placed on right wrist, O2 sats WNL for age. Infant noted to have an axillary temp of 100.9 and HR in 170's.RR regular without retractions or nasal flairing. HR decreasing as infant temperature decreases. NNP did not request additional interventions.   Output in Delivery Room: Stool     [place] Measurements    Weight: 7 lb 5.1 oz Length: 1' 7\"   Head circumference: 34.3 cm    Output in delivery room: Stool     Skin to Skin and Feeding Plan    Skin to skin initiation date/time: 1841    Skin to skin with: Mother  Skin to skin end date/time:        Labor Events and " Shoulder Dystocia    Shoulder dystocia present?: Pos  Anterior shoulder: right    Gentle attempt at traction, assisted by maternal expulsive forces?: No If no, why?: moved to maneuvers      First Maneuver: [place] maneuver, Suprapubic pressure       Second Maneuver:   Second maneuver: [place] david     Delivery (Maternal) (Provider to Complete) ([id number])    Episiotomy: None  Perineal lacerations: 3rd Repaired?: Yes   Periurethral laceration: bilateral Repaired?: No   Repair suture: 2-0 Vicryl  Genital tract inspection done: Pos     Blood Loss  Mother: Rakel Shaw ODETTE #[id number]   Start of Mother's Information    Delivery Blood Loss  05/11/23 0310 - 05/12/23 0616    Delivery QBL (mL) Hospital Encounter 344 mL    Total  344 mL         End of Mother's Information  Mother: Rakel Shaw #[id number]          Delivery - Provider to Complete ([id number])    Delivery Type (Choose the 1 that will go to the Birth History): Vaginal, Vacuum (Extractor)                                 Placenta    Date/Time: 5/11/2023  9:09 AM  Removal: Spontaneous  Disposition: Hospital disposal           Anesthesia    Method: Epidural  Cervical dilation at placement: 4-7                Presentation and Position    Presentation: Vertex     Occiput Anterior                 Rocío Michael Masters, DO

## 2023-05-12 LAB
ALT SERPL W P-5'-P-CCNC: 23 U/L (ref 10–35)
ALT SERPL W P-5'-P-CCNC: 24 U/L (ref 10–35)
AST SERPL W P-5'-P-CCNC: 109 U/L (ref 10–35)
AST SERPL W P-5'-P-CCNC: 99 U/L (ref 10–35)
CREAT SERPL-MCNC: 0.88 MG/DL (ref 0.51–0.95)
CREAT SERPL-MCNC: 1.13 MG/DL (ref 0.51–0.95)
GFR SERPL CREATININE-BSD FRML MDRD: 66 ML/MIN/1.73M2
GFR SERPL CREATININE-BSD FRML MDRD: 90 ML/MIN/1.73M2
HGB BLD-MCNC: 8.9 G/DL (ref 11.7–15.7)
HGB BLD-MCNC: 9.6 G/DL (ref 11.7–15.7)
PLATELET # BLD AUTO: 267 10E3/UL (ref 150–450)
PLATELET # BLD AUTO: 293 10E3/UL (ref 150–450)

## 2023-05-12 PROCEDURE — 250N000013 HC RX MED GY IP 250 OP 250 PS 637: Performed by: OBSTETRICS & GYNECOLOGY

## 2023-05-12 PROCEDURE — 84450 TRANSFERASE (AST) (SGOT): CPT | Performed by: OBSTETRICS & GYNECOLOGY

## 2023-05-12 PROCEDURE — 85018 HEMOGLOBIN: CPT | Performed by: OBSTETRICS & GYNECOLOGY

## 2023-05-12 PROCEDURE — 85049 AUTOMATED PLATELET COUNT: CPT | Performed by: OBSTETRICS & GYNECOLOGY

## 2023-05-12 PROCEDURE — 36415 COLL VENOUS BLD VENIPUNCTURE: CPT | Performed by: OBSTETRICS & GYNECOLOGY

## 2023-05-12 PROCEDURE — 82565 ASSAY OF CREATININE: CPT | Performed by: OBSTETRICS & GYNECOLOGY

## 2023-05-12 PROCEDURE — 120N000012 HC R&B POSTPARTUM

## 2023-05-12 PROCEDURE — 84460 ALANINE AMINO (ALT) (SGPT): CPT | Performed by: OBSTETRICS & GYNECOLOGY

## 2023-05-12 RX ORDER — FERROUS SULFATE 325(65) MG
325 TABLET ORAL DAILY
Status: DISCONTINUED | OUTPATIENT
Start: 2023-05-12 | End: 2023-05-13 | Stop reason: HOSPADM

## 2023-05-12 RX ADMIN — FERROUS SULFATE TAB 325 MG (65 MG ELEMENTAL FE) 325 MG: 325 (65 FE) TAB at 12:15

## 2023-05-12 RX ADMIN — IBUPROFEN 800 MG: 400 TABLET ORAL at 18:22

## 2023-05-12 RX ADMIN — IBUPROFEN 800 MG: 400 TABLET ORAL at 12:15

## 2023-05-12 RX ADMIN — ACETAMINOPHEN 650 MG: 325 TABLET ORAL at 05:38

## 2023-05-12 RX ADMIN — DOCUSATE SODIUM 100 MG: 100 CAPSULE, LIQUID FILLED ORAL at 12:15

## 2023-05-12 RX ADMIN — ACETAMINOPHEN 650 MG: 325 TABLET ORAL at 12:15

## 2023-05-12 RX ADMIN — IBUPROFEN 800 MG: 400 TABLET ORAL at 05:38

## 2023-05-12 RX ADMIN — DOCUSATE SODIUM 100 MG: 100 CAPSULE, LIQUID FILLED ORAL at 19:38

## 2023-05-12 RX ADMIN — ACETAMINOPHEN 650 MG: 325 TABLET ORAL at 18:22

## 2023-05-12 ASSESSMENT — ACTIVITIES OF DAILY LIVING (ADL)
ADLS_ACUITY_SCORE: 23

## 2023-05-12 NOTE — PROGRESS NOTES
[facility]    Post-Partum Progress Note    Assessment & Plan   Assessment:  Post-partum day #1  Vacuum extraction  Mild pre-eclampsia  Acute kidney injury likely secondary to pre-eclampsia, now improving  ABLA anemia. Asymptomatic    Doing well.  No excessive bleeding  Pain well-controlled.  No symptoms of severe pre-eclampsia    Plan:  Ambulation encouraged  Breast feeding strategies discussed  Pain control measures as needed  Reportable signs and symptoms dicussed with the patient  Start iron for anemia  Anticipate discharge in 1-2 days pending blood pressures and lab results.    Sara Vazquez MD     Interval History   Doing well.  Pain is well-controlled.  No fevers.  No history of foul-smelling vaginal discharge.  Good appetite.  Denies chest pain, shortness of breath, nausea or vomiting.  Vaginal bleeding is similar to a heavy menstrual flow.  Ambulatory.  Breastfeeding well.    Has had elevation in AST and Creatinine.   Denies any RUQ pain.   No difficulty with urination, and feels she is voiding normal amount     Medications     lactated ringers 125 mL/hr at 05/11/23 0003     - MEDICATION INSTRUCTIONS -       - MEDICATION INSTRUCTIONS -       oxytocin in 0.9% NaCl         docusate sodium  100 mg Oral BID     fentaNYL (SUBLIMAZE) 2 mcg/mL, bupivacaine (MARCAINE) 0.125%   EPIDURAL PCEA     ROPivacaine  10 mL EPIDURAL Once       Physical Exam   Temp: 97.9  F (36.6  C) Temp src: Oral BP: (!) 122/92 Pulse: 100   Resp: 16 SpO2: (!) 83 % O2 Device: None (Room air)    Vitals:    05/12/23 0554   Weight: 109.6 kg (241 lb 11.2 oz)     Vital Signs with Ranges  Temp:  [97.9  F (36.6  C)-98.2  F (36.8  C)] 97.9  F (36.6  C)  Pulse:  [100-105] 100  Resp:  [16] 16  BP: (112-156)/(71-92) 122/92  SpO2:  [83 %] 83 %  I/O last 3 completed shifts:  In: -   Out: 994 [Urine:650; Blood:344]    Uterine fundus is firm, non-tender and at the level of the umbilicus  Extremities Non-tender  Heart is regular  rate and rhythm and lungs clear to auscultation  DTRs 2+ bilaterally    Data   Recent Labs   Lab Test 05/09/23  2329   AS Negative     Recent Labs   Lab Test 05/12/23  0753 05/11/23  2347   HGB 8.9* 9.6*     Recent Labs   Lab Test 11/08/22  1653   RUQIGG Positive

## 2023-05-12 NOTE — PROVIDER NOTIFICATION
Dr. Franks notified of most recent labs results- ALT increased from 1.03 to 1.13 and AST increased from 68 to 109. No new orders received. Notify of severe range BP's otherwise continue to monitor.

## 2023-05-12 NOTE — LACTATION NOTE
Routine Lactation visit with Rakel, significant other Nicholas & baby girl. Rakel ready to work on feeding at time of visit. Baby has been both breastfeeding and supplementing with formula. Encouraged skin to skin to try to breastfeed. Reviewed positioning and attempted feeding at left breast, but baby snuggled in at breast and fell asleep. Recommended skin to skin with any feeding attempt. Breastfeed first prior to offering formula if choosing to give formula.    Reviewed milk supply and engorgement. Encouraged to review Breastfeeding section in Your Guide to Postpartum &  Care. Discussed typical  feeding patterns, cluster feeding, and ways to wake a sleepy baby for feedings.    Feeding plan: Recommend unlimited, frequent breast feedings: At least 8 - 12 times every 24 hours. If choosing to supplement, breastfeed first. Encouraged use of feeding log and to record feedings, and void/stool patterns.   Encouraged to call with needs, will revisit as needed. Rakel & Nicholas very appreciative of visit.    Enma Nguyen, RN-C, IBCLC, MNN, PHN, BSN

## 2023-05-12 NOTE — PLAN OF CARE
Goal Outcome Evaluation:      Plan of Care Reviewed With: patient, spouse    Overall Patient Progress: improvingOverall Patient Progress: improving    Vital signs stable. Postpartum assessment WDL. Pain controlled with  tylenol and Ibuprofen  Patient voiding without difficulty. Breastfeeding on cue with  minimal  assist.  Patient and infant bonding well. Started to pump because was sleepy at breast when the baby was over 24 hours of age. Supplement with Formula 15ml Via bottle.   Baby is not spitty. Will continue with current plan of care.

## 2023-05-12 NOTE — PLAN OF CARE
Goal Outcome Evaluation:      Plan of Care Reviewed With: patient, spouse    Overall Patient Progress: improvingOverall Patient Progress: improving    VSS. Fundus firm and midline. Scant flow. Pain 8/10, taking tylenol and ibuprofen per MAR. Declined paging provider for stronger medication. Reflexes normal. No clonus. Denies headache, visual changes, or epigastric pain. Breastfeeding/ Bottling. Ambulating free of dizziness or lightheaded. Voiding without difficulty. Encouraged to call with needs, questions, or concerns.

## 2023-05-13 VITALS
DIASTOLIC BLOOD PRESSURE: 71 MMHG | HEART RATE: 93 BPM | SYSTOLIC BLOOD PRESSURE: 109 MMHG | RESPIRATION RATE: 16 BRPM | BODY MASS INDEX: 37.93 KG/M2 | TEMPERATURE: 98.3 F | WEIGHT: 242.2 LBS | OXYGEN SATURATION: 83 %

## 2023-05-13 LAB
ALT SERPL W P-5'-P-CCNC: 29 U/L (ref 10–35)
AST SERPL W P-5'-P-CCNC: 76 U/L (ref 10–35)
CREAT SERPL-MCNC: 0.83 MG/DL (ref 0.51–0.95)
GFR SERPL CREATININE-BSD FRML MDRD: >90 ML/MIN/1.73M2

## 2023-05-13 PROCEDURE — 84450 TRANSFERASE (AST) (SGOT): CPT | Performed by: OBSTETRICS & GYNECOLOGY

## 2023-05-13 PROCEDURE — 36415 COLL VENOUS BLD VENIPUNCTURE: CPT | Performed by: OBSTETRICS & GYNECOLOGY

## 2023-05-13 PROCEDURE — 84460 ALANINE AMINO (ALT) (SGPT): CPT | Performed by: OBSTETRICS & GYNECOLOGY

## 2023-05-13 PROCEDURE — 250N000013 HC RX MED GY IP 250 OP 250 PS 637: Performed by: OBSTETRICS & GYNECOLOGY

## 2023-05-13 PROCEDURE — 82565 ASSAY OF CREATININE: CPT | Performed by: OBSTETRICS & GYNECOLOGY

## 2023-05-13 RX ORDER — ACETAMINOPHEN 500 MG
TABLET ORAL
Qty: 100 TABLET | Refills: 0 | Status: SHIPPED | OUTPATIENT
Start: 2023-05-13 | End: 2023-06-29

## 2023-05-13 RX ORDER — IBUPROFEN 800 MG/1
800 TABLET, FILM COATED ORAL EVERY 6 HOURS PRN
Qty: 30 TABLET | Refills: 0 | Status: SHIPPED | OUTPATIENT
Start: 2023-05-13 | End: 2023-06-29

## 2023-05-13 RX ORDER — AMOXICILLIN 250 MG
1-2 CAPSULE ORAL 2 TIMES DAILY PRN
Qty: 12 TABLET | Refills: 0 | Status: SHIPPED | OUTPATIENT
Start: 2023-05-13 | End: 2023-06-29

## 2023-05-13 RX ORDER — POLYETHYLENE GLYCOL 3350 17 G/17G
1 POWDER, FOR SOLUTION ORAL DAILY
Qty: 527 G | Refills: 0 | Status: SHIPPED | OUTPATIENT
Start: 2023-05-13 | End: 2023-06-29

## 2023-05-13 RX ADMIN — IBUPROFEN 800 MG: 400 TABLET ORAL at 00:10

## 2023-05-13 RX ADMIN — OXYCODONE HYDROCHLORIDE 5 MG: 5 TABLET ORAL at 00:16

## 2023-05-13 RX ADMIN — ACETAMINOPHEN 650 MG: 325 TABLET ORAL at 00:10

## 2023-05-13 RX ADMIN — FERROUS SULFATE TAB 325 MG (65 MG ELEMENTAL FE) 325 MG: 325 (65 FE) TAB at 08:09

## 2023-05-13 RX ADMIN — DOCUSATE SODIUM 100 MG: 100 CAPSULE, LIQUID FILLED ORAL at 08:09

## 2023-05-13 RX ADMIN — ACETAMINOPHEN 650 MG: 325 TABLET ORAL at 08:09

## 2023-05-13 RX ADMIN — IBUPROFEN 800 MG: 400 TABLET ORAL at 08:09

## 2023-05-13 ASSESSMENT — ACTIVITIES OF DAILY LIVING (ADL)
ADLS_ACUITY_SCORE: 23

## 2023-05-13 NOTE — DISCHARGE INSTRUCTIONS

## 2023-05-13 NOTE — PLAN OF CARE
Goal Outcome Evaluation:  Pt's pain controlled with Ibuprofen/Tylenol. Up and about in room. Going home today with .

## 2023-05-13 NOTE — PROGRESS NOTES
S: Pt doing well. Infant is being  and Bottlefed. Pain is controlled with current analgesics.  Medication(s) being used: acetaminophen and ibuprofen (OTC). Doing a lot of formula and feeling a bit guilty about it. Trying to breastfeed but feels like baby is fussy and hungry and no milk in yet. Hopeful that will keep working on it at home in more comfortable surroundings and then can scale back on the supplementing. Very much wanting to go home today    O: /71 (Cuff Size: Adult Large)   Pulse 93   Temp 98.3  F (36.8  C) (Oral)   Resp 16   Wt 109.6 kg (241 lb 11.2 oz)   LMP 08/24/2022   SpO2 (!) 83%   Breastfeeding Yes   BMI 37.86 kg/m          ABD:  Uterine fundus is firm, non-tender and at the level of the umbilicus                Hemoglobin   Date Value Ref Range Status   05/12/2023 8.9 (L) 11.7 - 15.7 g/dL Final   05/07/2021 13.3 11.7 - 15.7 g/dL Final          No results found for: RH    A:  Post-partum day #2 s/p Vacuum extraction and pre-e with severe features based on elevation in LFTs and acute renal compromise but all normal BPs PP    P: Continue PP Cares    Will d/c home today and f/up in 6 weeks    Has a home BP cuff so will check 1-2 BPs a day after d/c and then contact the office Monday with her BPs and potentially come in to clinic for BP check Monday/Tuesday  Discussed BP range to call in for sooner than later of 160/100 and symptoms of concern.  However ALT is normal now and AST continues to trend down and was 76 this AM down from 99 yest and 109 at it's peak  Creatinine is now normal at 0.83 down from 1.13 at peak    Reviewed d/c instructions and pain meds and bowel regimen

## 2023-05-13 NOTE — PLAN OF CARE
Goal Outcome Evaluation:      Plan of Care Reviewed With: patient, spouse    Overall Patient Progress: improvingOverall Patient Progress: improving     Vital signs stable. Postpartum assessment WDL. Pain controlled with tylenol and ibuprofen. Patient voiding without difficulty. Working on breastfeeding with assist. Supplementing with similac via bottle Patient and infant bonding well. Will continue with current plan of care.

## 2023-05-15 ENCOUNTER — ALLIED HEALTH/NURSE VISIT (OUTPATIENT)
Dept: NURSING | Facility: CLINIC | Age: 33
End: 2023-05-15
Payer: COMMERCIAL

## 2023-05-15 VITALS — HEART RATE: 79 BPM | SYSTOLIC BLOOD PRESSURE: 127 MMHG | DIASTOLIC BLOOD PRESSURE: 79 MMHG

## 2023-05-15 DIAGNOSIS — Z01.30 BLOOD PRESSURE CHECK: Primary | ICD-10-CM

## 2023-05-15 PROCEDURE — 99211 OFF/OP EST MAY X REQ PHY/QHP: CPT | Mod: 24

## 2023-05-15 NOTE — PROGRESS NOTES
Rakel is here for her PP BP check    At home highest is 130/90    In clinic 127/79  Feeling well, baby is doing great  Some BLE PP edema as to be expected    No BP meds currently.    Consulted with Dr. Go-she did speak with Rakel.  No further interventions needed-f/u for 6 wk PP OV  Understands s/s that would warrant evaluation    Cynthia Ospina RN on 5/15/2023 at 3:25 PM

## 2023-05-16 ENCOUNTER — PATIENT OUTREACH (OUTPATIENT)
Dept: CARE COORDINATION | Facility: CLINIC | Age: 33
End: 2023-05-16
Payer: COMMERCIAL

## 2023-05-16 NOTE — PROGRESS NOTES
[facility] Contact  [facility]/Voicemail     Clinical Data: Transitional Care Management Outreach     Outreach attempted x 2.  Left message on patient's voicemail, providing [facility]'s 24/7 scheduling and nurse triage phone number [phone] ([phone]) for questions/concerns and/or to schedule an appt with an [facility] provider, if they do not have a PCP.      Plan:  [facility] will do no further outreaches at this time.       Christine Vazquez  Community Health Worker  [facility] [place], [facility]      *Conne[facility] Care Resource Team does NOT follow patient ongoing. Referrals are identified based on internal discharge reports and the outreach is to ensure patient has an understanding of their discharge instructions.

## 2023-06-26 ENCOUNTER — MYC MEDICAL ADVICE (OUTPATIENT)
Dept: OBGYN | Facility: CLINIC | Age: 33
End: 2023-06-26
Payer: COMMERCIAL

## 2023-06-29 ENCOUNTER — TELEPHONE (OUTPATIENT)
Dept: OBGYN | Facility: CLINIC | Age: 33
End: 2023-06-29

## 2023-06-29 ENCOUNTER — OFFICE VISIT (OUTPATIENT)
Dept: OBGYN | Facility: CLINIC | Age: 33
End: 2023-06-29
Payer: COMMERCIAL

## 2023-06-29 VITALS
WEIGHT: 207 LBS | BODY MASS INDEX: 32.49 KG/M2 | HEIGHT: 67 IN | SYSTOLIC BLOOD PRESSURE: 104 MMHG | DIASTOLIC BLOOD PRESSURE: 64 MMHG

## 2023-06-29 DIAGNOSIS — F41.8 POSTPARTUM ANXIETY: Primary | ICD-10-CM

## 2023-06-29 PROBLEM — O09.91 SUPERVISION OF HIGH RISK PREGNANCY IN FIRST TRIMESTER: Status: RESOLVED | Noted: 2022-10-12 | Resolved: 2023-06-29

## 2023-06-29 PROCEDURE — 99214 OFFICE O/P EST MOD 30 MIN: CPT | Performed by: OBSTETRICS & GYNECOLOGY

## 2023-06-29 RX ORDER — ESCITALOPRAM OXALATE 5 MG/1
5 TABLET ORAL DAILY
Qty: 30 TABLET | Refills: 0 | Status: SHIPPED | OUTPATIENT
Start: 2023-06-29 | End: 2023-07-26

## 2023-06-29 ASSESSMENT — ANXIETY QUESTIONNAIRES
3. WORRYING TOO MUCH ABOUT DIFFERENT THINGS: SEVERAL DAYS
GAD7 TOTAL SCORE: 11
IF YOU CHECKED OFF ANY PROBLEMS ON THIS QUESTIONNAIRE, HOW DIFFICULT HAVE THESE PROBLEMS MADE IT FOR YOU TO DO YOUR WORK, TAKE CARE OF THINGS AT HOME, OR GET ALONG WITH OTHER PEOPLE: SOMEWHAT DIFFICULT
6. BECOMING EASILY ANNOYED OR IRRITABLE: MORE THAN HALF THE DAYS
5. BEING SO RESTLESS THAT IT IS HARD TO SIT STILL: SEVERAL DAYS
1. FEELING NERVOUS, ANXIOUS, OR ON EDGE: MORE THAN HALF THE DAYS
7. FEELING AFRAID AS IF SOMETHING AWFUL MIGHT HAPPEN: MORE THAN HALF THE DAYS
2. NOT BEING ABLE TO STOP OR CONTROL WORRYING: MORE THAN HALF THE DAYS
GAD7 TOTAL SCORE: 11

## 2023-06-29 ASSESSMENT — PATIENT HEALTH QUESTIONNAIRE - PHQ9
5. POOR APPETITE OR OVEREATING: SEVERAL DAYS
SUM OF ALL RESPONSES TO PHQ QUESTIONS 1-9: 8

## 2023-06-29 NOTE — PROGRESS NOTES
SUBJECTIVE:                                                   Rakel Shaw is a 33 year old female who presents to clinic today for the following health issue(s):  Patient presents with:  Follow Up: Patient missed her post partum visit and wanted to come in to discuss depression and anxiety.    Additional information: patient had a vaginal delivery on 23    HPI:  Rakel presents with concerns about worsening mood. She denies sad mood or depressed mood but states that she has increased irritability and mood swings. She finds it difficult to sleep. Her  is doing well and sleeps well but she finds herself unable to sleep. She has noticed being short tempered with her  as well. She considers her birth experience traumatic but states that she is not feeling sad due to that. She plans to establish care with a therapist in the future. Of note Rakel is a  herself. She has great support from her mother who is able to provide respite care of the infant.     Patient's last menstrual period was 2022..   Patient is sexually active, .  Using nothing for contraception.    reports that she has never smoked. She has never used smokeless tobacco.  Health maintenance: reviewed     Today's PHQ-2 Score:       2023     4:13 PM   PHQ-2 (  [employer])   Q1: Little interest or pleasure in doing things 1   Q2: Feeling down, depressed or hopeless 2   PHQ-2 Score 3     Today's PHQ-9 Score:       2023     4:13 PM   PHQ-9 SCORE   PHQ-9 Total Score 8     Today's HAO-7 Score:       2023     4:13 PM   HAO-7 SCORE   Total Score 11       Problem list and histories reviewed & adjusted, as indicated.  Additional history: as documented.    Patient Active Problem List   Diagnosis     Rhabdomyolysis     Breast lump on left side at 6 o'clock position     PCOS (polycystic ovarian syndrome)     BMI 30.0-30.9,adult     Mild pre-eclampsia     Mild pre-eclampsia in third trimester     36 weeks gestation  "of pregnancy     Obesity in pregnancy, antepartum, third trimester     Past Surgical History:   Procedure Laterality Date     HEAD & NECK SURGERY      teeth extraction younger      Social History     Tobacco Use     Smoking status: Never     Smokeless tobacco: Never   Substance Use Topics     Alcohol use: No      Problem (# of Occurrences) Relation (Name,Age of Onset)    Anesthesia Reaction (1) Sister    Diabetes (2) Father, Sister    Hypertension (1) Mother    Thyroid Disease (1) Sister    Hyperlipidemia (1) Father    Coronary Artery Disease (1) Father            Current Outpatient Medications   Medication Sig     escitalopram (LEXAPRO) 5 MG tablet Take 1 tablet (5 mg) by mouth daily     Cholecalciferol (VITAMIN D3) 50 MCG (2000 UT) CAPS Take 1 capsule by mouth daily (Patient not taking: Reported on 6/29/2023)     Prenatal Vit-Fe Fumarate-FA (PRENATAL VITAMIN PO) Take 1 tablet by mouth daily (Patient not taking: Reported on 6/29/2023)     No current facility-administered medications for this visit.     Allergies   Allergen Reactions     Seafood Other (See Comments), Difficulty breathing and Rash     Fish/ Shrimp     Morphine Nausea and Vomiting     Pcn [Penicillins] Hives       ROS:  12 point review of systems negative other than symptoms noted below or in the HPI.  No urinary frequency or dysuria, bladder or kidney problems      OBJECTIVE:     /64   Ht 1.702 m (5' 7\")   Wt 93.9 kg (207 lb)   LMP 08/24/2022   Breastfeeding Yes   BMI 32.42 kg/m    Body mass index is 32.42 kg/m .    Exam:  Constitutional:  Appearance: Well nourished, well developed alert, in no acute distress  Neurologic:  Mental Status:  Oriented X3.  Normal strength and tone, sensory exam grossly normal, mentation intact and speech normal.    Psychiatric:  Mentation appears normal and affect normal/bright.     In-Clinic Test Results:  No results found for this or any previous visit (from the past 24 hour(s)).    ASSESSMENT/PLAN:          "                                               ICD-10-CM    1. Postpartum anxiety  O99.345 escitalopram (LEXAPRO) 5 MG tablet    F41.8 Adult Mental Health  Referral        Medical management of postpartum anxiety was discussed, I recommend Lexapro and she states that her mother is on the same medication and is tolerating it well. I recommend starting at 5 mg and then increasing to 10 mg daily. A mental health referral was placed today as well.     Shima Go MD  [facility] [place]

## 2023-06-29 NOTE — TELEPHONE ENCOUNTER
Patient had appointment with her OBGYN care team. [facility] services were requested. No immediate safety/risk issues were reported or identified.  Explained the role of the [facility] and provided informational handout and contact information for the [facility].     Pt shared she is not sleeping much, even when she has a break. Loss of appetite, ernst/angry, crying, feeling overwhelmed, anxiety/scared and not knowing why (when alone and randomly pops up). She has been diagnosed in the past with anxiety. She doesn't feel like it was a consistent part of her life.     Appointment scheduled for 7/20/2023    Lyn Brown [facility], Behavioral Health Clinician

## 2023-07-20 DIAGNOSIS — Z01.812 PRE-PROCEDURE LAB EXAM: Primary | ICD-10-CM

## 2023-07-26 ENCOUNTER — MYC MEDICAL ADVICE (OUTPATIENT)
Dept: OBGYN | Facility: CLINIC | Age: 33
End: 2023-07-26
Payer: COMMERCIAL

## 2023-07-26 DIAGNOSIS — F41.8 POSTPARTUM ANXIETY: ICD-10-CM

## 2023-07-26 RX ORDER — ESCITALOPRAM OXALATE 5 MG/1
5 TABLET ORAL DAILY
Qty: 90 TABLET | Refills: 0 | Status: SHIPPED | OUTPATIENT
Start: 2023-07-26 | End: 2023-07-31

## 2023-07-26 NOTE — TELEPHONE ENCOUNTER
"Requested Prescriptions   Pending Prescriptions Disp Refills    escitalopram (LEXAPRO) 5 MG tablet 30 tablet 0     Sig: Take 1 tablet (5 mg) by mouth daily       SSRIs Protocol Failed - 7/26/2023 12:52 PM        Failed - No positive pregnancy test in last 12 months        Passed - Recent (12 mo) or future (30 days) visit within the authorizing provider's specialty     Patient has had an office visit with the authorizing provider or a provider within the authorizing providers department within the previous 12 mos or has a future within next 30 days. See \"Patient Info\" tab in inbasket, or \"Choose Columns\" in Meds & Orders section of the refill encounter.              Passed - Medication is active on med list        Passed - Patient is age 18 or older        Passed - No active pregnancy on record           Last Written Prescription Date:  06/29/23  Last Fill Quantity: 30,  # refills: 0   Last office visit: 6/29/2023 with prescribing provider:  Dr Shima Go   Future Office Visit:   Next 5 appointments (look out 90 days)      Aug 16, 2023 10:20 AM  IUD INSERTION AND REMOVAL with JOHN Serrano CNM  [facility] [place] ([facility] [place] ) [address] [address]  [place] [phone]                   "

## 2023-07-26 NOTE — TELEPHONE ENCOUNTER
"Requested Prescriptions   Pending Prescriptions Disp Refills    escitalopram (LEXAPRO) 5 MG tablet 30 tablet 0     Sig: Take 1 tablet (5 mg) by mouth daily       SSRIs Protocol Failed - 7/26/2023 12:52 PM        Failed - No positive pregnancy test in last 12 months        Passed - Recent (12 mo) or future (30 days) visit within the authorizing provider's specialty     Patient has had an office visit with the authorizing provider or a provider within the authorizing providers department within the previous 12 mos or has a future within next 30 days. See \"Patient Info\" tab in inbasket, or \"Choose Columns\" in Meds & Orders section of the refill encounter.              Passed - Medication is active on med list        Passed - Patient is age 18 or older        Passed - No active pregnancy on record           Last Written Prescription Date:  6/29/23  Last Fill Quantity: #30   Last office visit: 6/29/23:    Prescription approved per [facility] Refill Protocol.    Liberty Constantino RN on 7/26/2023 at 2:31 PM    "

## 2023-07-28 ENCOUNTER — TELEPHONE (OUTPATIENT)
Dept: OBGYN | Facility: CLINIC | Age: 33
End: 2023-07-28
Payer: COMMERCIAL

## 2023-07-28 NOTE — TELEPHONE ENCOUNTER
Completed paperwork has been faxed to [phone] by Zaheer Connolly . The paperwork has been scanned into the pts chart . Hard copy has been filed in the drawer at [place] 's desk by Martha on 7/28/23

## 2023-07-28 NOTE — TELEPHONE ENCOUNTER
Type of Paperwork received:  FMLA     Date Rcvd:  07/28/2023    Rcvd From (Company name): [employer]    Provider:  Dr. Franks (on call) - normally sees Dr. Go    Placed on Provider Cart Date:  07/23/2023

## 2023-07-31 ENCOUNTER — MYC MEDICAL ADVICE (OUTPATIENT)
Dept: OBGYN | Facility: CLINIC | Age: 33
End: 2023-07-31
Payer: COMMERCIAL

## 2023-07-31 DIAGNOSIS — F41.8 POSTPARTUM ANXIETY: Primary | ICD-10-CM

## 2023-07-31 RX ORDER — ESCITALOPRAM OXALATE 10 MG/1
10 TABLET ORAL DAILY
Qty: 90 TABLET | Refills: 3 | Status: SHIPPED | OUTPATIENT
Start: 2023-07-31 | End: 2024-04-25

## 2023-07-31 RX ORDER — ESCITALOPRAM OXALATE 10 MG/1
10 TABLET ORAL DAILY
Qty: 90 TABLET | Refills: 1 | Status: SHIPPED | OUTPATIENT
Start: 2023-07-31 | End: 2024-04-25

## 2023-07-31 NOTE — TELEPHONE ENCOUNTER
6/29/23 last OV w Enadeghe and PP anxiety was discussed  Escitalopram  5 mg started and increasing to 10 mg was discussed as option    Routing pt mychart message to provider to advise. Pended Rx. F/up recs?    Kanchan Garcia RN on 7/31/2023 at 11:23 AM

## 2023-08-16 ENCOUNTER — OFFICE VISIT (OUTPATIENT)
Dept: MIDWIFE SERVICES | Facility: CLINIC | Age: 33
End: 2023-08-16
Payer: COMMERCIAL

## 2023-08-16 ENCOUNTER — LAB (OUTPATIENT)
Dept: LAB | Facility: CLINIC | Age: 33
End: 2023-08-16
Payer: COMMERCIAL

## 2023-08-16 VITALS
WEIGHT: 205.6 LBS | HEIGHT: 67 IN | SYSTOLIC BLOOD PRESSURE: 116 MMHG | DIASTOLIC BLOOD PRESSURE: 62 MMHG | BODY MASS INDEX: 32.27 KG/M2

## 2023-08-16 DIAGNOSIS — Z01.812 PRE-PROCEDURE LAB EXAM: ICD-10-CM

## 2023-08-16 DIAGNOSIS — E28.2 PCOS (POLYCYSTIC OVARIAN SYNDROME): ICD-10-CM

## 2023-08-16 DIAGNOSIS — Z30.430 ENCOUNTER FOR INSERTION OF INTRAUTERINE CONTRACEPTIVE DEVICE: ICD-10-CM

## 2023-08-16 DIAGNOSIS — Z97.5 IUD (INTRAUTERINE DEVICE) IN PLACE: ICD-10-CM

## 2023-08-16 DIAGNOSIS — Z30.09 GENERAL COUNSELING AND ADVICE ON CONTRACEPTIVE MANAGEMENT: Primary | ICD-10-CM

## 2023-08-16 PROBLEM — O14.00 MILD PRE-ECLAMPSIA: Status: RESOLVED | Noted: 2023-05-09 | Resolved: 2023-08-16

## 2023-08-16 PROBLEM — Z3A.36 36 WEEKS GESTATION OF PREGNANCY: Status: RESOLVED | Noted: 2023-05-09 | Resolved: 2023-08-16

## 2023-08-16 PROBLEM — O14.03 MILD PRE-ECLAMPSIA IN THIRD TRIMESTER: Status: RESOLVED | Noted: 2023-05-09 | Resolved: 2023-08-16

## 2023-08-16 PROBLEM — O99.213 OBESITY IN PREGNANCY, ANTEPARTUM, THIRD TRIMESTER: Status: RESOLVED | Noted: 2023-05-09 | Resolved: 2023-08-16

## 2023-08-16 LAB — HCG UR QL: NEGATIVE

## 2023-08-16 PROCEDURE — 58300 INSERT INTRAUTERINE DEVICE: CPT

## 2023-08-16 PROCEDURE — 99213 OFFICE O/P EST LOW 20 MIN: CPT | Mod: 25

## 2023-08-16 PROCEDURE — 81025 URINE PREGNANCY TEST: CPT

## 2023-08-16 RX ORDER — IBUPROFEN 800 MG/1
800 TABLET, FILM COATED ORAL EVERY 8 HOURS PRN
Qty: 6 TABLET | Refills: 0 | Status: SHIPPED | OUTPATIENT
Start: 2023-08-16 | End: 2023-08-31

## 2023-08-16 RX ORDER — IBUPROFEN 200 MG
800 TABLET ORAL ONCE
Status: COMPLETED | OUTPATIENT
Start: 2023-08-16 | End: 2023-08-16

## 2023-08-16 RX ADMIN — Medication 800 MG: at 11:18

## 2023-08-16 NOTE — PATIENT INSTRUCTIONS
Your Intrauterine Device (IUD)     What to watch for right after IUD placement:    Some women may experience uterine cramps, bleeding, and/or dizziness during and right after IUD placement.     To help minimize the cramps, you may take ibuprofen 600 mg with food. These symptoms should improve over the next 24 hours.  Mild cramping may be present for a few days after IUD placement. You may continue taking ibuprofen as directed if needed.    You may experience spotting or bleeding for the first few weeks to months after IUD placement.      Other side effects can include:  Anemia, hemorrhage, partial or complete expulsion of device, uterine or cervical perforation, embedding of IUD in the uterine wall, increased risk of pelvic inflammatory disease.  Women who become pregnant with an IUD in place are at higher risk of an ectopic pregnancy.  There is also a higher risk of miscarriage when pregnancy occurs with an IUD in place.    Use condoms or abstain from sex for 7 days after the insertion of your Mirena or Kyleena or Juliann  IUD.  This is not necessary if you had a ParaGard IUD placed.    If you experience fever, chills, abdominal pain, worsening pelvic pain, dizziness, unusually heavy vaginal bleeding, suspected expulsion of device or foul smelling vaginal discharge please call the clinic for evaluation.    Please schedule an appointment at the clinic for a string check 4-6 weeks after IUD placement    Your periods may change (Juliann/Kyleena/Mirena):    For the first 3 to 6 months, your monthly period may become irregular. You may also have frequent spotting or light bleeding. A few women have heavy bleeding during this time. After your body adjusts, the number of bleeding days is likely to decrease (but may remain irregular), and you may even find that your periods stop altogether for as long as Juliann/Mirena is in place.  Your periods will return rapidly once the IUD is removed.      ParaGard IUD users:    ParaGard  IUD users may experience heavier than normal cycles while their IUD is in place, this is considered normal   Back-up contraception is not needed      Checking for your strings:    We encourage everyone with an IUD in place to check for their strings monthly    You may check your own IUD strings by inserting a finger into the vagina and feeling the strings as they exit the cervix.  The strings will initially feel firm, like fishing line, but will soften over a few weeks.  After the strings have softened, you or your partner should not be able to feel the strings during intercourse.     If the string length greatly changes or if you cannot feel your strings at all please make an appointment to see you midwife and use a backup method of contraception like a condom.    If you can feel something hard/plastic like the IUD may not be in the correct place. You should then see your healthcare provider to have the position confirmed with ultrasound.       Remember:    IUD's do not protect against HIV or STIs.  IUD's do not prevent the formation of ovarian cysts.  IUD's do not typically reduce acne or cause weight gain or mood changes.    For more information:  [email or web address]-us.[email or web address]/    The ParaGard IUD is effective for 12 years.  The Juliann IUD is effective for 3 years.  The Kyleena IUD is effective for 5 years.  The Mirena IUD is effective for 7 years.  Your IUD can easily be removed by a healthcare professional at any time.    Do not try to remove the IUD yourself.      If you have questions or concerns please call:    [facility]   [phone]

## 2023-08-16 NOTE — PROGRESS NOTES
SUBJECTIVE:   Rakel Shaw is a 33 year old who presents to the clinic for discussion of birth control methods.   Today she is interested in discussing Mirena IUD  Histories reviewed and updated  Past Medical History:   Diagnosis Date    Anxiety     Mild pre-eclampsia 05/09/2023    PCOS (polycystic ovarian syndrome) 2021    Uncomplicated asthma     as child     Past Surgical History:   Procedure Laterality Date    HEAD & NECK SURGERY      teeth extraction younger     Social History     Socioeconomic History    Marital status: Single     Spouse name: Not on file    Number of children: Not on file    Years of education: Not on file    Highest education level: Not on file   Occupational History    Not on file   Tobacco Use    Smoking status: Never    Smokeless tobacco: Never   Vaping Use    Vaping Use: Never used   Substance and Sexual Activity    Alcohol use: No    Drug use: No    Sexual activity: Yes     Partners: Male   Other Topics Concern    Not on file   Social History Narrative    Not on file     Social Determinants of Health     Financial Resource Strain: Not on file   Food Insecurity: Not on file   Transportation Needs: Not on file   Physical Activity: Not on file   Stress: Not on file   Social Connections: Not on file   Intimate Partner Violence: Not on file   Housing Stability: Not on file     Family History   Problem Relation Age of Onset    Hypertension Mother     Hyperlipidemia Father     Coronary Artery Disease Father     Diabetes Father     Thyroid Disease Sister     Diabetes Sister     Anesthesia Reaction Sister        Denies the following contraindications to the IUD:  Distortion of the uterine cavity  Ludin's disease/copper allergy  Active pelvic infection  Unexplained uterine bleeding  Known or suspected pregnancy  Breast cancer or liver disease    Health maintenance updated:  reviewed    ROS:   12 point review of systems negative other than symptoms noted below or in the HPI.    EXAM:  /62    "Ht 1.702 m (5' 7\")   Wt 93.3 kg (205 lb 9.6 oz)   BMI 32.20 kg/m    Body mass index is 32.2 kg/m .    ASSESSMENT/PLAN:    ICD-10-CM    1. General counseling and advice on contraceptive management  Z30.09       2. Encounter for insertion of intrauterine contraceptive device  Z30.430 ibuprofen (ADVIL/MOTRIN) tablet 800 mg     levonorgestrel (MIRENA) 52 MG (20 mcg/day) IUD     levonorgestrel (MIRENA) 52 MG (20 mcg/day) IUD 1 each     INSERTION INTRAUTERINE DEVICE      3. PCOS (polycystic ovarian syndrome)  E28.2       4. IUD (intrauterine device) in place  Z97.5         There are no contraindications to the use of Mirena IUD    COUNSELING:  Reviewed risks and benefits of contraceptive use  Discussed proper use of chosen method  Handouts/Instrucions provided    Pt elects for IUD insertion today. See below for documentation.    IUD Insertion:  CONSULT:    Is a pregnancy test required: Yes.  Was it positive or negative?  Negative  Was a consent obtained?  Yes    Subjective: Rakel Shaw is a 33 year old  presents for IUD and desires Mirena type IUD.    Patient has been given the opportunity to ask questions about all forms of birth control, including all options appropriate for Rakel Shaw. Discussed that no method of birth control, except abstinence is 100% effective against pregnancy or sexually transmitted infection.     Rakel Shaw understands she may have the IUD removed at any time. IUD should be removed by a health care provider.    The entire insertion procedure was reviewed with the patient, including care after placement.    No LMP recorded. Last sexual activity: 2023 . She DOES have an allergy to shellfish.     hCG Urine Qualitative   Date Value Ref Range Status   2023 Negative Negative Final     Comment:     This test is for screening purposes.  Results should be interpreted along with the clinical picture.  Confirmation testing is available if warranted by ordering [id number], HCG Quantitative " "Pregnancy.         /62   Ht 1.702 m (5' 7\")   Wt 93.3 kg (205 lb 9.6 oz)   BMI 32.20 kg/m      Pelvic Exam:   EG/BUS: normal genital architecture without lesions, erythema or abnormal secretions.   Vagina: moist, pink, rugae with physiologic discharge and secretions  Cervix: parous no lesions and pink, moist, closed, without lesion or CMT  Uterus: anteverted position, mobile, no pain  Adnexa: within normal limits and no masses, nodularity, tenderness    PROCEDURE NOTE: -- IUD Insertion    Reason for Insertion: contraception    Premedicated with ibuprofen.  Under sterile technique, cervix was visualized with speculum and prepped with Chlorhexidine solution swab x 3. Tenaculum was placed for stability. The uterus was gently straightened and sounded to 7.5 cm. IUD prepared for placement, and IUD inserted according to 's instructions without difficulty or significant resitance, and deployed at the fundus. The strings were visualized and trimmed to 3.0 cm from the external os. Tenaculum was removed and hemostasis noted. Speculum removed.  Patient tolerated procedure well.    Lot # [id number]  Exp: 5/2026    EBL: minimal    Complications: none    ASSESSMENT:     ICD-10-CM    1. General counseling and advice on contraceptive management  Z30.09       2. Encounter for insertion of intrauterine contraceptive device  Z30.430 ibuprofen (ADVIL/MOTRIN) tablet 800 mg     levonorgestrel (MIRENA) 52 MG (20 mcg/day) IUD     levonorgestrel (MIRENA) 52 MG (20 mcg/day) IUD 1 each     INSERTION INTRAUTERINE DEVICE      3. PCOS (polycystic ovarian syndrome)  E28.2       4. IUD (intrauterine device) in place  Z97.5            PLAN:    Given 's handouts, including when to have IUD removed, list of danger s/sx, side effects and follow up recommended. Encouraged condom use for prevention of STD. Back up contraception advised for 7 days if progestin method. Advised to call for any fever, for prolonged or severe " pain or bleeding, abnormal vaginal discharge, or unable to palpate strings. She was advised to use pain medications (ibuprofen) as needed for mild to moderate pain. Advised to follow-up in clinic in 4-6 weeks for IUD string check if unable to find strings or as directed by provider.     JOHN Mccoy CNM    20 minutes spent on procedure  40 minutes spent by me on the date of the encounter doing chart review, history and exam, documentation and further activities per the note    Clinic Administered Medication Documentation      Intrauterine/Implant Insertion Documentation    Device was placed by provider (please see MAR for given by information). Please see MAR and medication order for additional information.     Type: Mirena  LOT [id number]  Remove/Replace by: 8/16/2031    Expiration Date:  5/2026

## 2023-08-29 DIAGNOSIS — Z01.812 PRE-PROCEDURE LAB EXAM: Primary | ICD-10-CM

## 2023-08-31 ENCOUNTER — LAB (OUTPATIENT)
Dept: LAB | Facility: CLINIC | Age: 33
End: 2023-08-31
Payer: COMMERCIAL

## 2023-08-31 ENCOUNTER — OFFICE VISIT (OUTPATIENT)
Dept: MIDWIFE SERVICES | Facility: CLINIC | Age: 33
End: 2023-08-31
Payer: COMMERCIAL

## 2023-08-31 VITALS — BODY MASS INDEX: 32.04 KG/M2 | WEIGHT: 204.6 LBS | SYSTOLIC BLOOD PRESSURE: 114 MMHG | DIASTOLIC BLOOD PRESSURE: 68 MMHG

## 2023-08-31 DIAGNOSIS — Z30.09 BIRTH CONTROL COUNSELING: Primary | ICD-10-CM

## 2023-08-31 DIAGNOSIS — Z01.812 PRE-PROCEDURE LAB EXAM: ICD-10-CM

## 2023-08-31 DIAGNOSIS — Z30.09 BIRTH CONTROL COUNSELING: ICD-10-CM

## 2023-08-31 PROBLEM — Z97.5 IUD (INTRAUTERINE DEVICE) IN PLACE: Status: RESOLVED | Noted: 2023-08-16 | Resolved: 2023-08-31

## 2023-08-31 LAB — HCG UR QL: NEGATIVE

## 2023-08-31 PROCEDURE — 81025 URINE PREGNANCY TEST: CPT

## 2023-08-31 PROCEDURE — 99214 OFFICE O/P EST MOD 30 MIN: CPT | Performed by: ADVANCED PRACTICE MIDWIFE

## 2023-08-31 RX ORDER — DROSPIRENONE AND ETHINYL ESTRADIOL 0.02-3(28)
1 KIT ORAL DAILY
Qty: 84 TABLET | OUTPATIENT
Start: 2023-08-31

## 2023-08-31 RX ORDER — DROSPIRENONE AND ETHINYL ESTRADIOL 0.02-3(28)
1 KIT ORAL DAILY
Qty: 30 TABLET | Refills: 0 | Status: SHIPPED | OUTPATIENT
Start: 2023-08-31 | End: 2023-10-09

## 2023-08-31 NOTE — CONFIDENTIAL NOTE
"Requested Prescriptions   Pending Prescriptions Disp Refills    drospirenone-ethinyl estradiol (RADHA) 3-0.02 MG tablet [Pharmacy Med Name: DROSPIRENONE/ETHY EST 3/0.02MG T 28] 84 tablet      Sig: TAKE 1 TABLET BY MOUTH DAILY       Contraceptives Protocol Passed - 8/31/2023  2:22 PM        Passed - Patient is not a current smoker if age is 35 or older        Passed - Recent (12 mo) or future (30 days) visit within the authorizing provider's specialty     Patient has had an office visit with the authorizing provider or a provider within the authorizing providers department within the previous 12 mos or has a future within next 30 days. See \"Patient Info\" tab in inbasket, or \"Choose Columns\" in Meds & Orders section of the refill encounter.              Passed - Medication is active on med list        Passed - No active pregnancy on record        Passed - No positive pregnancy test in past 12 months           Last Written Prescription Date:  8/31/23  Last Fill Quantity: 30,  # refills: 0   Last office visit: 8/31/2023 ; last virtual visit: Visit date not found with prescribing provider:  IRVING Freitas CNM   Future Office Visit:    Prescription sent for one month of Radha (hx of PCOS)  Return to clinic in 3 months for med check and BP check. Will then provide refills.    Pt seen today in clinic and 1 month supply sent and pharmacy requesting a 90 day supply - based on notes above, it was intended for 1 month supply and return in 3 months for further refills - is this accurate? Or send 90 days?    Kanchan Garcia RN on 8/31/2023 at 2:32 PM      "

## 2023-08-31 NOTE — TELEPHONE ENCOUNTER
Pt updated with plan per [facility] CNM--see previous note.  Pt verbalized understanding, in agreement with plan, and voiced no further questions.  Pt transferred to scheduling.  Fili Meeks RN on 8/31/2023 at 2:41 PM

## 2023-08-31 NOTE — TELEPHONE ENCOUNTER
Would like patient to follow up after 1 month for a BP check and med check. Then will provide a 3 month supply with refills.     Bijal LOPEZ CNM

## 2023-08-31 NOTE — PROGRESS NOTES
SUBJECTIVE:   Rakel Shaw is a 33 year old who presents to the clinic for discussion of birth control methods. She had an IUD placed 8/16/23 which spontaneously expelled a ~1.5 later. Reports her period began on Friday and IUD expelled on Saturday. Her period bleeding after the IUD expelled was heavy.   She is concerned about the risk of another IUD expelling if she gets one placed today.   She has used the following methods in the past: POP  Today she is interested in discussing NABIL  Histories reviewed and updated  Past Medical History:   Diagnosis Date    Anxiety     IUD (intrauterine device) in place 08/16/2023    Placed 8/16/2023     Type: Mirena  LOT [id number]  Remove/Replace by: 8/16/2031     Expiration Date:  5/2026    Mild pre-eclampsia 05/09/2023    PCOS (polycystic ovarian syndrome) 2021    Uncomplicated asthma     as child     Past Surgical History:   Procedure Laterality Date    HEAD & NECK SURGERY      teeth extraction younger     Social History     Socioeconomic History    Marital status: Single     Spouse name: Not on file    Number of children: Not on file    Years of education: Not on file    Highest education level: Not on file   Occupational History    Not on file   Tobacco Use    Smoking status: Never    Smokeless tobacco: Never   Vaping Use    Vaping Use: Never used   Substance and Sexual Activity    Alcohol use: No    Drug use: No    Sexual activity: Yes     Partners: Male   Other Topics Concern    Not on file   Social History Narrative    Not on file     Social Determinants of Health     Financial Resource Strain: Not on file   Food Insecurity: Not on file   Transportation Needs: Not on file   Physical Activity: Not on file   Stress: Not on file   Social Connections: Not on file   Intimate Partner Violence: Not on file   Housing Stability: Not on file     Family History   Problem Relation Age of Onset    Hypertension Mother     Hyperlipidemia Father     Coronary Artery Disease Father      Diabetes Father     Thyroid Disease Sister     Diabetes Sister     Anesthesia Reaction Sister      Denies the following contraindications to estrogen/progesterone combined contraception:  Migraine with aura  Smoking over age 35  Liver disease  Personal history of blood clot or stroke   History of heart disease  History of breast cancer  Undiagnosed vaginal bleeding  Hypertension  Pregnancy    Denies the following contraindications to the IUD:  Distortion of the uterine cavity  Ludin's disease/copper allergy  Active pelvic infection  Unexplained uterine bleeding  Known or suspected pregnancy  Breast cancer or liver disease      ROS:   12 point review of systems negative other than symptoms noted below or in the HPI.    EXAM:  /68   Wt 92.8 kg (204 lb 9.6 oz)   BMI 32.04 kg/m    Body mass index is 32.04 kg/m .    ASSESSMENT/PLAN:    ICD-10-CM    1. Birth control counseling  Z30.09 drospirenone-ethinyl estradiol (RADHA) 3-0.02 MG tablet        There are no contraindications to the use of NABIL    COUNSELING:  Reviewed increased risk of spontaneous IUD expulsion if another IUD is placed today  Reviewed other options for birth control (Depo-provera, POP, Nexplanon) Rakel opts for a NABIL and may consider IUD placement again in the future  Reviewed risks and benefits of contraceptive (NABIL) use  Discussed proper use of chosen method  Handouts/Instructions provided  Prescription sent for one month of Radha (hx of PCOS)  Return to clinic in 3 months for med check and BP check. Will then provide refills.    30 minutes spent by me on the date of the encounter doing chart review, patient visit, and documentation     Bijal LOPEZ CNM

## 2023-10-09 ENCOUNTER — MYC MEDICAL ADVICE (OUTPATIENT)
Dept: OBGYN | Facility: CLINIC | Age: 33
End: 2023-10-09
Payer: COMMERCIAL

## 2023-10-09 DIAGNOSIS — Z30.09 BIRTH CONTROL COUNSELING: ICD-10-CM

## 2023-10-09 RX ORDER — DROSPIRENONE AND ETHINYL ESTRADIOL 0.02-3(28)
1 KIT ORAL DAILY
Qty: 30 TABLET | Refills: 0 | Status: SHIPPED | OUTPATIENT
Start: 2023-10-09 | End: 2023-10-11

## 2023-10-09 NOTE — TELEPHONE ENCOUNTER
Schedulers can you please reach out to patient to set up med check with the midwives.   Once scheduled can you send encounter back to us to send through med. Thanks!    Liberty Constantino RN on 10/9/2023 at 2:18 PM

## 2023-10-09 NOTE — TELEPHONE ENCOUNTER
Requested Prescriptions   Pending Prescriptions Disp Refills    drospirenone-ethinyl estradiol (RADHA) 3-0.02 MG tablet 30 tablet 0     Sig: Take 1 tablet by mouth daily       There is no refill protocol information for this order        Last Written Prescription Date:  8/31/23  Last Fill Quantity: #30, 0 refill   Last office visit: 6/29/23   Bijal Freitas APRN CNM     JT    8/31/23  2:38 PM  Note  Would like patient to follow up after 1 month for a BP check and med check. Then will provide a 3 month supply with refills.      Bijal LOPEZ CNM        Mychart message sent. Due for ov. Needs appt before we can provide rx refill.    Liberty Constantino RN on 10/9/2023 at 2:04 PM

## 2023-10-10 DIAGNOSIS — Z30.09 BIRTH CONTROL COUNSELING: ICD-10-CM

## 2023-10-10 RX ORDER — DROSPIRENONE AND ETHINYL ESTRADIOL 0.02-3(28)
1 KIT ORAL DAILY
Qty: 30 TABLET | Refills: 0 | OUTPATIENT
Start: 2023-10-10

## 2023-10-10 NOTE — TELEPHONE ENCOUNTER
"Requested Prescriptions   Pending Prescriptions Disp Refills    drospirenone-ethinyl estradiol (RADHA) 3-0.02 MG tablet 30 tablet 0     Sig: Take 1 tablet by mouth daily       Contraceptives Protocol Passed - 10/10/2023  2:29 PM        Passed - Patient is not a current smoker if age is 35 or older        Passed - Recent (12 mo) or future (30 days) visit within the authorizing provider's specialty     Patient has had an office visit with the authorizing provider or a provider within the authorizing providers department within the previous 12 mos or has a future within next 30 days. See \"Patient Info\" tab in inbasket, or \"Choose Columns\" in Meds & Orders section of the refill encounter.              Passed - Medication is active on med list        Passed - No active pregnancy on record        Passed - No positive pregnancy test in past 12 months           Last Written Prescription Date:  10/09/2023  Last Fill Quantity: 30,  # refills: 0   Last office visit: 6/29/2023 ; last virtual visit: Visit date not found with prescribing provider:  Shima Go   Future Office Visit:  Phone Visit 10/11/2023          "

## 2023-10-10 NOTE — PROGRESS NOTES
Rakel Shaw is a 33 year old female who is being evaluated via a billable telephone visit.      What phone number would you like to be contacted at? [phone]  How would you like to obtain your AVS? Jorge      Originating Location (pt. Location): Home      Distant Location (provider location):  On-site    Phone call time: 7 minutes      SUBJECTIVE:                                                   Rakel Shaw is a 33 year old female who presents for virtual visit today for the following health issue(s):  Patient presents with:  Recheck Medication: OCP      HPI:  Been on Elly for 1 month. Has spotted a few days and feels slightly more emotional. Otherwise happy with the OCPs    No LMP recorded..     Patient is sexually active, .  Using oral contraceptives for contraception.    reports that she has never smoked. She has never used smokeless tobacco.      Health maintenance updated:  no, Needs pap    Today's PHQ-2 Score:       2023     4:13 PM   PHQ-2 (  [employer])   Q1: Little interest or pleasure in doing things 1   Q2: Feeling down, depressed or hopeless 2   PHQ-2 Score 3     Today's PHQ-9 Score:       2023     4:13 PM   PHQ-9 SCORE   PHQ-9 Total Score 8     Today's HAO-7 Score:       2023     4:13 PM   HAO-7 SCORE   Total Score 11       Problem list and histories reviewed & adjusted, as indicated.  Additional history: as documented.    Patient Active Problem List   Diagnosis    Rhabdomyolysis    Breast lump on left side at 6 o'clock position    PCOS (polycystic ovarian syndrome)    BMI 30.0-30.9,adult     Past Surgical History:   Procedure Laterality Date    HEAD & NECK SURGERY      teeth extraction younger      Social History     Tobacco Use    Smoking status: Never    Smokeless tobacco: Never   Substance Use Topics    Alcohol use: No      Problem (# of Occurrences) Relation (Name,Age of Onset)    Anesthesia Reaction (1) Sister    Diabetes (2) Father, Sister    Hypertension (1) Mother     Thyroid Disease (1) Sister    Hyperlipidemia (1) Father    Coronary Artery Disease (1) Father              Current Outpatient Medications   Medication Sig    drospirenone-ethinyl estradiol (RADHA) 3-0.02 MG tablet Take 1 tablet by mouth daily    escitalopram (LEXAPRO) 10 MG tablet Take 1 tablet (10 mg) by mouth daily    escitalopram (LEXAPRO) 10 MG tablet Take 1 tablet (10 mg) by mouth daily     No current facility-administered medications for this visit.     Allergies   Allergen Reactions    Seafood Other (See Comments), Difficulty breathing and Rash     Fish/ Shrimp    Morphine Nausea and Vomiting    Pcn [Penicillins] Hives         OBJECTIVE:     No vitals were obtained today due to virtual visit.    Physical Exam  GENERAL: Healthy, alert and no distress  PSYCH: Mentation appears normal, affect normal/bright, judgement and insight intact, normal speech and appearance well-groomed.          ASSESSMENT/PLAN:                                                      Phone call duration: 7 minutes      ICD-10-CM    1. Encounter for surveillance of contraceptive pills  Z30.41 drospirenone-ethinyl estradiol (RADHA) 3-0.02 MG tablet          There are no Patient Instructions on file for this visit.    Discussed staying on OCPs for another 1-2 months to see if side effects dissipate. If side effects worsen or if they don't go away she will let us know for a dose adjustment.     Discussed getting a BP check within the next 1-2 months. Reviewed that estrogen can increase BP. She is understanding and will check her BP.     1 year rx sent.     15 minutes spent by me on the date of the encounter doing chart review, history and exam, documentation and further activities per the note     JOHN Jackson CNM  [facility]

## 2023-10-10 NOTE — TELEPHONE ENCOUNTER
Has phone visit tomorrow to discuss OCP and for further refills.    Kanchan Garcia RN on 10/10/2023 at 3:12 PM

## 2023-10-11 ENCOUNTER — VIRTUAL VISIT (OUTPATIENT)
Dept: MIDWIFE SERVICES | Facility: CLINIC | Age: 33
End: 2023-10-11
Payer: COMMERCIAL

## 2023-10-11 DIAGNOSIS — Z30.41 ENCOUNTER FOR SURVEILLANCE OF CONTRACEPTIVE PILLS: Primary | ICD-10-CM

## 2023-10-11 PROCEDURE — 99442 PR PHYSICIAN TELEPHONE EVALUATION 11-20 MIN: CPT | Mod: 93 | Performed by: ADVANCED PRACTICE MIDWIFE

## 2023-10-11 RX ORDER — DROSPIRENONE AND ETHINYL ESTRADIOL 0.02-3(28)
1 KIT ORAL DAILY
Qty: 84 TABLET | Refills: 3 | Status: SHIPPED | OUTPATIENT
Start: 2023-10-11 | End: 2024-04-25

## 2023-11-24 ENCOUNTER — OFFICE VISIT (OUTPATIENT)
Dept: URGENT CARE | Facility: URGENT CARE | Age: 33
End: 2023-11-24
Payer: COMMERCIAL

## 2023-11-24 VITALS
BODY MASS INDEX: 31.01 KG/M2 | SYSTOLIC BLOOD PRESSURE: 123 MMHG | OXYGEN SATURATION: 100 % | WEIGHT: 198 LBS | HEART RATE: 78 BPM | TEMPERATURE: 98.4 F | DIASTOLIC BLOOD PRESSURE: 81 MMHG

## 2023-11-24 DIAGNOSIS — B37.31 CANDIDAL VULVOVAGINITIS: ICD-10-CM

## 2023-11-24 DIAGNOSIS — R35.0 URINARY FREQUENCY: Primary | ICD-10-CM

## 2023-11-24 DIAGNOSIS — N30.01 ACUTE CYSTITIS WITH HEMATURIA: ICD-10-CM

## 2023-11-24 LAB
BACTERIA #/AREA URNS HPF: ABNORMAL /HPF
CLUE CELLS: ABNORMAL
MUCOUS THREADS #/AREA URNS LPF: PRESENT /LPF
RBC #/AREA URNS AUTO: ABNORMAL /HPF
SQUAMOUS #/AREA URNS AUTO: ABNORMAL /LPF
TRICHOMONAS, WET PREP: ABNORMAL
WBC #/AREA URNS AUTO: ABNORMAL /HPF
WBC'S/HIGH POWER FIELD, WET PREP: ABNORMAL
YEAST, WET PREP: PRESENT

## 2023-11-24 PROCEDURE — 81015 MICROSCOPIC EXAM OF URINE: CPT

## 2023-11-24 PROCEDURE — 87086 URINE CULTURE/COLONY COUNT: CPT | Performed by: PHYSICIAN ASSISTANT

## 2023-11-24 PROCEDURE — 99213 OFFICE O/P EST LOW 20 MIN: CPT | Performed by: PHYSICIAN ASSISTANT

## 2023-11-24 PROCEDURE — 87210 SMEAR WET MOUNT SALINE/INK: CPT

## 2023-11-24 RX ORDER — NITROFURANTOIN 25; 75 MG/1; MG/1
100 CAPSULE ORAL 2 TIMES DAILY
Qty: 10 CAPSULE | Refills: 0 | Status: SHIPPED | OUTPATIENT
Start: 2023-11-24 | End: 2023-11-29

## 2023-11-24 RX ORDER — FLUCONAZOLE 150 MG/1
TABLET ORAL
Qty: 2 TABLET | Refills: 0 | Status: SHIPPED | OUTPATIENT
Start: 2023-11-24 | End: 2024-04-25

## 2023-11-24 ASSESSMENT — ENCOUNTER SYMPTOMS
FLANK PAIN: 0
VOMITING: 0
NAUSEA: 0
FEVER: 0
BACK PAIN: 0
FREQUENCY: 1
CHILLS: 0
DYSURIA: 1

## 2023-11-24 NOTE — PATIENT INSTRUCTIONS
UA is suggestive of bladder infection. You will be treated with Nitrofurantoin monohydrate/macrocrystals (Macrobid) 100 mg PO BID x 5 days until cultures return and will adjust as necessary.  Alternate acetaminophen and ibuprofen as needed for discomfort. May try OTC Pyridium as needed for bladder spasms. I recommend cranberry supplements and D-mannose as well. Increase fluids.          detailed exam

## 2023-11-24 NOTE — PROGRESS NOTES
Assessment & Plan       1. Candidal vulvovaginitis    - fluconazole (DIFLUCAN) 150 MG tablet; Take one tablet (150 mg) today and then another tablet (150 mg) after completion of antibiotics for a total of 2 doses  Dispense: 2 tablet; Refill: 0    2. Acute cystitis with hematuria    -UA is suggestive of acute cystitis. Lack of flank pain, fever, nausea make me less concerned for an ascending infection. Patient will be treated with Nitrofurantoin monohydrate/macrocrystals (Macrobid) 100 mg PO BID x 5 days until cultures return and will adjust as necessary.       - nitroFURantoin macrocrystal-monohydrate (MACROBID) 100 MG capsule; Take 1 capsule (100 mg) by mouth 2 times daily for 5 days  Dispense: 10 capsule; Refill: 0    3. Urinary frequency    - Wet Prep is positive for yeast  -UA is suggestive of acute cystitis    - Wet preparation  - UA Microscopic with Reflex to Culture  - Urine Culture  Results for orders placed or performed in visit on 11/24/23   UA Microscopic with Reflex to Culture     Status: Abnormal   Result Value Ref Range    Bacteria Urine Moderate (A) None Seen /HPF    RBC Urine 5-10 (A) 0-2 /HPF /HPF    WBC Urine 10-25 (A) 0-5 /HPF /HPF    Squamous Epithelials Urine Moderate (A) None Seen /LPF    Mucus Urine Present (A) None Seen /LPF   Wet preparation     Status: Abnormal    Specimen: Vagina; Swab   Result Value Ref Range    Trichomonas Absent Absent    Yeast Present (A) Absent    Clue Cells Absent Absent    WBCs/high power field 2+ (A) None       Patient Instructions   UA is suggestive of bladder infection. You will be treated with Nitrofurantoin monohydrate/macrocrystals (Macrobid) 100 mg PO BID x 5 days until cultures return and will adjust as necessary.  Alternate acetaminophen and ibuprofen as needed for discomfort. May try OTC Pyridium as needed for bladder spasms. I recommend cranberry supplements and D-mannose as well. Increase fluids.           Return if symptoms worsen or fail to improve,  for Follow up.    At the end of the encounter, I discussed results, diagnosis, medications. Discussed red flags for immediate return to clinic/ER, as well as indications for follow up if no improvement. Patient understood and agreed to plan. Patient was stable for discharge.    Hadley Ellington is a 33 year old female who presents to clinic today  for the following health issues:  Chief Complaint   Patient presents with    Urgent Care     Pt states that she has had UTI symptoms for 5 days.     HPI    Patient reports dysuria, urgency, frequency which started 5 days ago. She also reports vaginal itching.  She tried Azo today which helped with pain. She denies fever, chills, vaginal discharge, back pain, flank pain, nausea vomiting.      Review of Systems   Constitutional:  Negative for chills and fever.   Gastrointestinal:  Negative for nausea and vomiting.   Genitourinary:  Positive for dysuria, frequency and urgency. Negative for flank pain.   Musculoskeletal:  Negative for back pain.       Problem List:  2023-08: IUD (intrauterine device) in place  2023-05: Mild pre-eclampsia  2023-05: Mild pre-eclampsia in third trimester  2023-05: 36 weeks gestation of pregnancy  2023-05: Obesity in pregnancy, antepartum, third trimester  2023-05: Encounter for triage in pregnant patient  2023-05: Indication for care in labor and delivery, antepartum  2023-05: Liveborn infant  2022-10: BMI 30.0-30.9,adult  2022-10: Supervision of high risk pregnancy in first trimester  2021-02: PCOS (polycystic ovarian syndrome)  2020-09: Breast lump on left side at 6 o'clock position  2019-06: Rhabdomyolysis      Past Medical History:   Diagnosis Date    Anxiety     IUD (intrauterine device) in place 08/16/2023    Placed 8/16/2023     Type: Mirena  LOT [id number]  Remove/Replace by: 8/16/2031     Expiration Date:  5/2026    Mild pre-eclampsia 05/09/2023    PCOS (polycystic ovarian syndrome) 2021    Uncomplicated asthma     as child       Social  History     Tobacco Use    Smoking status: Never    Smokeless tobacco: Never   Substance Use Topics    Alcohol use: No           Objective    /81 (BP Location: Right arm, Patient Position: Sitting, Cuff Size: Adult Regular)   Pulse 78   Temp 98.4  F (36.9  C) (Oral)   Wt 89.8 kg (198 lb)   SpO2 100%   BMI 31.01 kg/m    Physical Exam  Vitals and nursing note reviewed.   Cardiovascular:      Rate and Rhythm: Normal rate and regular rhythm.   Pulmonary:      Effort: Pulmonary effort is normal.      Breath sounds: Normal breath sounds.   Abdominal:      General: Abdomen is flat.      Palpations: Abdomen is soft.      Tenderness: There is abdominal tenderness in the suprapubic area. There is no right CVA tenderness or left CVA tenderness.   Lymphadenopathy:      Cervical: No cervical adenopathy.   Skin:     General: Skin is warm and dry.      Findings: No rash.   Neurological:      General: No focal deficit present.      Mental Status: She is alert and oriented to person, place, and time.   Psychiatric:         Mood and Affect: Mood normal.         Behavior: Behavior normal.              Jennifer Cox PA-C

## 2023-11-25 LAB — BACTERIA UR CULT: NORMAL

## 2024-02-04 ENCOUNTER — HEALTH MAINTENANCE LETTER (OUTPATIENT)
Age: 34
End: 2024-02-04

## 2024-04-25 ENCOUNTER — APPOINTMENT (OUTPATIENT)
Dept: ULTRASOUND IMAGING | Facility: CLINIC | Age: 34
End: 2024-04-25
Attending: EMERGENCY MEDICINE
Payer: COMMERCIAL

## 2024-04-25 ENCOUNTER — HOSPITAL ENCOUNTER (EMERGENCY)
Facility: CLINIC | Age: 34
Discharge: OTHER INSTITUTION | End: 2024-04-25
Attending: EMERGENCY MEDICINE | Admitting: EMERGENCY MEDICINE
Payer: COMMERCIAL

## 2024-04-25 ENCOUNTER — HOSPITAL ENCOUNTER (INPATIENT)
Facility: CLINIC | Age: 34
LOS: 2 days | Discharge: HOME | DRG: 041 | End: 2024-04-28
Attending: HOSPITALIST | Admitting: INTERNAL MEDICINE
Payer: COMMERCIAL

## 2024-04-25 VITALS
OXYGEN SATURATION: 100 % | SYSTOLIC BLOOD PRESSURE: 130 MMHG | DIASTOLIC BLOOD PRESSURE: 85 MMHG | HEART RATE: 81 BPM | WEIGHT: 199.08 LBS | HEIGHT: 67 IN | TEMPERATURE: 98.1 F | BODY MASS INDEX: 31.25 KG/M2 | RESPIRATION RATE: 15 BRPM

## 2024-04-25 DIAGNOSIS — I82.622 ACUTE DEEP VEIN THROMBOSIS (DVT) OF LEFT UPPER EXTREMITY, UNSPECIFIED VEIN (H): ICD-10-CM

## 2024-04-25 DIAGNOSIS — G54.0 TOS (THORACIC OUTLET SYNDROME): Primary | ICD-10-CM

## 2024-04-25 DIAGNOSIS — I82.B12 SUBCLAVIAN VEIN THROMBOSIS, LEFT (H): ICD-10-CM

## 2024-04-25 LAB
ANION GAP SERPL CALCULATED.3IONS-SCNC: 12 MMOL/L (ref 7–15)
BUN SERPL-MCNC: 10.9 MG/DL (ref 6–20)
CALCIUM SERPL-MCNC: 9 MG/DL (ref 8.6–10)
CHLORIDE SERPL-SCNC: 103 MMOL/L (ref 98–107)
CREAT SERPL-MCNC: 0.75 MG/DL (ref 0.51–0.95)
DEPRECATED HCO3 PLAS-SCNC: 23 MMOL/L (ref 22–29)
EGFRCR SERPLBLD CKD-EPI 2021: >90 ML/MIN/1.73M2
ERYTHROCYTE [DISTWIDTH] IN BLOOD BY AUTOMATED COUNT: 13.2 % (ref 10–15)
GLUCOSE SERPL-MCNC: 121 MG/DL (ref 70–99)
HCT VFR BLD AUTO: 37.7 % (ref 35–47)
HGB BLD-MCNC: 12.6 G/DL (ref 11.7–15.7)
MCH RBC QN AUTO: 26.3 PG (ref 26.5–33)
MCHC RBC AUTO-ENTMCNC: 33.4 G/DL (ref 31.5–36.5)
MCV RBC AUTO: 79 FL (ref 78–100)
PLATELET # BLD AUTO: 407 10E3/UL (ref 150–450)
POTASSIUM SERPL-SCNC: 3.5 MMOL/L (ref 3.4–5.3)
RBC # BLD AUTO: 4.79 10E6/UL (ref 3.8–5.2)
SODIUM SERPL-SCNC: 138 MMOL/L (ref 135–145)
WBC # BLD AUTO: 11.2 10E3/UL (ref 4–11)

## 2024-04-25 PROCEDURE — 36415 COLL VENOUS BLD VENIPUNCTURE: CPT | Performed by: EMERGENCY MEDICINE

## 2024-04-25 PROCEDURE — 93971 EXTREMITY STUDY: CPT | Mod: LT

## 2024-04-25 PROCEDURE — 99285 EMERGENCY DEPT VISIT HI MDM: CPT | Mod: 25

## 2024-04-25 PROCEDURE — 96365 THER/PROPH/DIAG IV INF INIT: CPT | Mod: 59

## 2024-04-25 PROCEDURE — 80048 BASIC METABOLIC PNL TOTAL CA: CPT | Performed by: EMERGENCY MEDICINE

## 2024-04-25 PROCEDURE — 85027 COMPLETE CBC AUTOMATED: CPT | Performed by: EMERGENCY MEDICINE

## 2024-04-25 PROCEDURE — 96366 THER/PROPH/DIAG IV INF ADDON: CPT

## 2024-04-25 PROCEDURE — 250N000011 HC RX IP 250 OP 636: Performed by: EMERGENCY MEDICINE

## 2024-04-25 RX ORDER — DROSPIRENONE AND ETHINYL ESTRADIOL 0.02-3(28)
1 KIT ORAL AT BEDTIME
Status: ON HOLD | COMMUNITY
End: 2024-04-28

## 2024-04-25 RX ORDER — ESCITALOPRAM OXALATE 10 MG/1
10 TABLET ORAL AT BEDTIME
COMMUNITY
End: 2024-05-14

## 2024-04-25 RX ORDER — HEPARIN SODIUM 10000 [USP'U]/100ML
0-5000 INJECTION, SOLUTION INTRAVENOUS CONTINUOUS
Status: DISCONTINUED | OUTPATIENT
Start: 2024-04-25 | End: 2024-04-25 | Stop reason: HOSPADM

## 2024-04-25 RX ADMIN — HEPARIN SODIUM 1650 UNITS/HR: 10000 INJECTION, SOLUTION INTRAVENOUS at 21:27

## 2024-04-25 ASSESSMENT — ACTIVITIES OF DAILY LIVING (ADL)
ADLS_ACUITY_SCORE: 35
ADLS_ACUITY_SCORE: 33
ADLS_ACUITY_SCORE: 35

## 2024-04-25 ASSESSMENT — COLUMBIA-SUICIDE SEVERITY RATING SCALE - C-SSRS
1. IN THE PAST MONTH, HAVE YOU WISHED YOU WERE DEAD OR WISHED YOU COULD GO TO SLEEP AND NOT WAKE UP?: NO
2. HAVE YOU ACTUALLY HAD ANY THOUGHTS OF KILLING YOURSELF IN THE PAST MONTH?: NO
2. HAVE YOU ACTUALLY HAD ANY THOUGHTS OF KILLING YOURSELF IN THE PAST MONTH?: NO
1. IN THE PAST MONTH, HAVE YOU WISHED YOU WERE DEAD OR WISHED YOU COULD GO TO SLEEP AND NOT WAKE UP?: NO
6. HAVE YOU EVER DONE ANYTHING, STARTED TO DO ANYTHING, OR PREPARED TO DO ANYTHING TO END YOUR LIFE?: NO
6. HAVE YOU EVER DONE ANYTHING, STARTED TO DO ANYTHING, OR PREPARED TO DO ANYTHING TO END YOUR LIFE?: NO

## 2024-04-25 NOTE — ED TRIAGE NOTES
Patient has been having pain in her arm for a month. Patient was seen at [facility] and had labs that were abnormal and she was advised to come to the ED.      Triage Assessment (Adult)       Row Name 04/25/24 1825          Triage Assessment    Airway WDL WDL        Respiratory WDL    Respiratory WDL WDL        Skin Circulation/Temperature WDL    Skin Circulation/Temperature WDL WDL        Cardiac WDL    Cardiac WDL WDL        Peripheral/Neurovascular WDL    Peripheral Neurovascular WDL WDL        Cognitive/Neuro/Behavioral WDL    Cognitive/Neuro/Behavioral WDL WDL

## 2024-04-25 NOTE — LETTER
1990      To Whom it may concern:    Rakel Shaw was seen in our Emergency Department today, 04/30/24 for an injury that was reported to be work related.      The injury occurred on ***.  Diagnosis: ***.      For the next *** days she should not work.    After returning the following restrictions apply until ***:  A) Bend: {WC FREQUENCY:079170}  B) Squat: {WC FREQUENCY:866534}  C) Walk/Stand: {WC FREQUENCY:500415}  D) Reach above shoulders: {WC FREQUENCY:096583}  E) Lift, carry, push and pull no more than: {WC LBS:256029}    {WC RESTRICT:919831}    Follow up: ***.    Sincerely,

## 2024-04-25 NOTE — ED PROVIDER NOTES
"ED ATTENDING PHYSICIAN NOTE:   I evaluated this patient in conjunction with Yared Nunez PA-C  I have participated in the care of the patient and personally performed key elements of the history, exam, and medical decision making.        History     Chief Complaint:  Arm Pain       HPI   Rakel Shaw is a 33 year old female who in brief presents with 1 month of left arm pain described as an ache with no numbness or tingling.  She was seen virtually as an outpatient and had various laboratory test done which showed elevation of D-dimer and slight elevation of CK.  She denies chest pain or shortness of breath.  She denies swelling or color change of the left upper extremity.  She notes in the past she has had similar pain in her left upper extremity that was attributed to rhabdomyolysis.  She had an ultrasound at that time that did not demonstrate a DVT.  She denies fever or chills.  She is on birth control.  She denies a history of DVT or PE.      Independent Historian:   None - Patient Only    Review of External Notes:   Virtual visit/E consult reviewed from earlier today.      Medications:    drospirenone-ethinyl estradiol (RADHA) 3-0.02 MG tablet  escitalopram (LEXAPRO) 10 MG tablet        Past Medical History:    Past Medical History:   Diagnosis Date    Anxiety     IUD (intrauterine device) in place 08/16/2023    Mild pre-eclampsia 05/09/2023    PCOS (polycystic ovarian syndrome) 2021    Uncomplicated asthma        Past Surgical History:    Past Surgical History:   Procedure Laterality Date    HEAD & NECK SURGERY      teeth extraction younger        Physical Exam   Patient Vitals for the past 24 hrs:   BP Temp Temp src Pulse Resp SpO2 Height Weight   04/25/24 2145 130/85 -- -- 81 15 100 % -- --   04/25/24 2140 136/86 -- -- 69 -- 100 % -- --   04/25/24 1827 (!) 139/95 98.1  F (36.7  C) Oral 82 18 100 % 1.702 m (5' 7\") 90.3 kg (199 lb 1.2 oz)        Physical Exam    General: Adult sitting upright  Eyes: PERRL, " Conjunctive within normal limits  ENT: Moist mucous membranes, oropharynx clear.   CV: Normal S1S2, no murmur, rub or gallop. Regular rate and rhythm.  Left radial pulse intact.  Resp: Clear to auscultation bilaterally, no wheezes, rales or rhonchi. Normal respiratory effort.  GI: Abdomen is soft, nontender and nondistended. No palpable masses. No rebound or guarding.  MSK: Slight tenderness over the left lateral upper arm without palpable cord or mass.  No edema.  Normal active range of motion.  Skin: Warm and dry. No rashes or lesions or ecchymoses on visible skin.  Neuro: Alert and oriented. Responds appropriately to all questions and commands. No focal findings appreciated. Normal muscle tone.  Sensation intact to light touch over all dermatomes of the left upper extremity.  Strength grossly intact bilateral upper extremities.  Psych: Normal mood and affect. Pleasant.     Emergency Department Course   Imaging:  US Upper Extremity Venous Duplex Left   Final Result   IMPRESSION:    1.  Left upper extremity DVT as above.             Laboratory:  Labs Ordered and Resulted from Time of ED Arrival to Time of ED Departure   CBC WITH PLATELETS - Abnormal       Result Value    WBC Count 11.2 (*)     RBC Count 4.79      Hemoglobin 12.6      Hematocrit 37.7      MCV 79      MCH 26.3 (*)     MCHC 33.4      RDW 13.2      Platelet Count 407     BASIC METABOLIC PANEL          Emergency Department Course & Assessments:    Interventions:  Medications   heparin 25,000 units in 0.45% NaCl 250 mL ANTICOAGULANT infusion (1,650 Units/hr Intravenous $New Bag 4/25/24 2127)   heparin ANTICOAGULANT Loading dose for HIGH INTENSITY TREATMENT * Give BEFORE starting heparin infusion (7,200 Units Intravenous $Given 4/25/24 2127)        Assessments:  Past medical records, nursing notes, and vitals reviewed.  I discussed the patient with MATEO Nunez, primarily caring for the patient.  I performed an exam of the patient and obtained history,  as documented above.     Independent Interpretation (X-rays, CTs, rhythm strip):  None    Consultations/Discussion of Management or Tests:  None-PA discussed the patient with interventional radiology for guidance on further care-recommended transfer to [facility] for likely imaging for ongoing heparin infusion, potential thrombectomy and/or possible surgery.    Social Determinants of Health affecting care:   None    Disposition:  The patient was transferred to [facility] via EMS in stable condition. Dr. Quinones accepted the patient for transfer.     Impression & Plan         Medical Decision Making:  Rakel Shaw is a 33-year-old female healthy, on birth control, no family history of DVT or PE that she is aware of who presents emergency department left arm pain.  She had outpatient labs that demonstrated slight elevation of D-dimer.  She is not having chest pain, shortness of breath, or any hemodynamic instability.  Ultrasounds obtained of the left upper extremity in the setting of arm pain and elevated D-dimer and this demonstrated somewhat diffuse left upper extremity DVT.  There were no objective findings on examination of the arm aside from mild tenderness but not over the area of the deep venous system.  She was neurovascularly intact.  CT chest did not seem emergently indicated after discussion with IR who felt that this could wait until admission.  They recommended heparin and transferred to [facility] for definitive care.  The patient felt comfortable this plan.  All questions were answered prior to transfer of care.      Diagnosis:    ICD-10-CM    1. Acute deep vein thrombosis (DVT) of left upper extremity, unspecified vein (H)  I82.622            Aye Patel MD  4/25/2024          Aye Patel MD  04/25/24 2212

## 2024-04-25 NOTE — ED PROVIDER NOTES
"    History     Chief Complaint:  Arm Pain       HPI   Rakel Shaw is a 33 year old female who presents to the ED for evaluation of arm pain.  Patient states she has had left upper arm pain for about a month.  Describes pain as an muscle ache.  Endorses occasional paresthesias of left arm.  Denies any recent trauma or strenuous activity.  She states she had this years ago and was diagnosed with rhabdomyolysis.  At the time, she was doing a lot of Cross[employer] exercises.  She was seen in the clinic recently and had lab tests done.  Her CK and D-dimer was noted to be elevated.  She denies any chest pain, shortness of breath, nausea, vomiting, abdominal pain.  No history of stroke or blood clots.  No cardiac history.  She is on oral contraceptives.  No recent surgeries.       Independent Historian:    Patient only.    Review of External Notes:  Review telemedicine note on 4/25/2024.  Seen for left arm pain CK1 94, D-dimer 0.64.     Medications:    drospirenone-ethinyl estradiol (RADHA) 3-0.02 MG tablet  escitalopram (LEXAPRO) 10 MG tablet        Past Medical History:    Past Medical History:   Diagnosis Date    Anxiety     IUD (intrauterine device) in place 08/16/2023    Mild pre-eclampsia 05/09/2023    PCOS (polycystic ovarian syndrome) 2021    Uncomplicated asthma        Past Surgical History:    Past Surgical History:   Procedure Laterality Date    HEAD & NECK SURGERY      teeth extraction younger          Physical Exam   Patient Vitals for the past 24 hrs:   BP Temp Temp src Pulse Resp SpO2 Height Weight   04/25/24 2145 130/85 -- -- 81 15 100 % -- --   04/25/24 2140 136/86 -- -- 69 -- 100 % -- --   04/25/24 1827 (!) 139/95 98.1  F (36.7  C) Oral 82 18 100 % 1.702 m (5' 7\") 90.3 kg (199 lb 1.2 oz)        Physical Exam  Physical Exam:  General: No acute distress  Head: normocephalic, atraumatic  Eyes: PERRLA, EOMI  Nose: no signs of bleeding, or drainage  Throat: moist mucous membranes, no erythema, exudate, or drainage of " the posterior oropharynx.   CV: RRR, no murmur/gallop/rubs  Pulm: lungs clear to ausculation bilaterally, normal respiratory effort, normal chest expansion with breathing   Abdomen: soft, non-tender, non-distended, no rigidity or guarding, normal BS  MSK: No cervical tenderness, no midline tenderness  Ext: Slight tenderness over superior left arm.  No palpable mass.  No deformities, ecchymosis, edema.  CMS intact.  Normal range of motion  Skin: Warm, dry, no rashes  Neuro: A&O x3, normal speech. No focal neurologic deficits. Muscle strength 5/5 bilaterally. Sensation is intact  Psych: Appropriate mood. Cooperative      Emergency Department Course   ECG  None    Imaging:  US Upper Extremity Venous Duplex Left   Final Result   IMPRESSION:    1.  Left upper extremity DVT as above.          Laboratory:  Labs Ordered and Resulted from Time of ED Arrival to Time of ED Departure   CBC WITH PLATELETS - Abnormal       Result Value    WBC Count 11.2 (*)     RBC Count 4.79      Hemoglobin 12.6      Hematocrit 37.7      MCV 79      MCH 26.3 (*)     MCHC 33.4      RDW 13.2      Platelet Count 407     BASIC METABOLIC PANEL - Abnormal    Sodium 138      Potassium 3.5      Chloride 103      Carbon Dioxide (CO2) 23      Anion Gap 12      Urea Nitrogen 10.9      Creatinine 0.75      GFR Estimate >90      Calcium 9.0      Glucose 121 (*)         Procedures   None    Emergency Department Course & Assessments:    Interventions:  Medications   heparin 25,000 units in 0.45% NaCl 250 mL ANTICOAGULANT infusion (1,650 Units/hr Intravenous $New Bag 4/25/24 2127)   heparin ANTICOAGULANT Loading dose for HIGH INTENSITY TREATMENT * Give BEFORE starting heparin infusion (7,200 Units Intravenous $Given 4/25/24 2127)        Assessments:  See ED course.    Independent Interpretation (X-rays, CTs, rhythm strip):  None    Consultations/Discussion of Management or Tests:  ED Course as of 04/25/24 231   Thu Apr 25, 2024   3478 Evaluated patient and  obtained history.   2021 Reassessed patient and discussed workup.   2021 Dicussed with IR regarding further management.   2128 Discussed with Dr. Quinones [facility] hospitalist who accepted patient for transfer.       Social Determinants of Health affecting care:  None     Disposition:  The patient was transferred to [facility] via EMS. Dr. Quinones accepted the patient for transfer.    Impression & Plan      Medical Decision Making:  Patient is a pleasant 33-year-old female who presents to the ED for left arm pain. Vitals are normal on arrival. She states that she has had a cramping muscle ache for about a month now.  She had a similar occurrence a couple years ago and was diagnosed with rhabdomyolysis.  She was concerned that this came back so she had a telehealth visit today.  CK level was slightly elevated and D-dimer was elevated.  Patient was concerned so she came to the ER for further evaluation.  Denies any recent trauma or strenuous activity.  Denies history of blood clots.  Denies any chest pain, shortness of breath.  See further HPI details above.  Differential includes but is not limited to rotator cuff injury, tendinitis, DVT, cervical radiculopathy.  On exam, she has normal range of motion of left arm.  No deformities, ecchymosis, or edema noted. No discoloration.  Sensation and radial pulses are intact.  She has slight tenderness over superior left arm.  No mass on palpation.  No cervical tenderness or midline tenderness.  Obtained ultrasound due to elevated D-dimer from telehealth visit.  Ultrasound showed DVT of left internal jugular vein extending to the medial subclavian vein.  Started patient on heparin.  Discussed with IR who recommended transfer to [facility] for thrombectomy tomorrow.  Per discussion with IR, no CT indicated at this time given that she is vitally stable and is not having current chest pain or shortness of breath.  Discussed with [facility] hospitalist who accepted  patient for transfer. Patient is agreeable to plan of care.  Vitals remained stable. All questions were answered.      Diagnosis:    ICD-10-CM    1. Acute deep vein thrombosis (DVT) of left upper extremity, unspecified vein (H)  I82.622            Discharge Medications:  New Prescriptions    No medications on file          Yared Nunez PA-C  4/25/2024              Yared Nunez PA-C  04/25/24 0133

## 2024-04-25 NOTE — LETTER
[facility] SURGERY  [address] [place] [address]  Phone: [phone]  Fax: [phone]    April 30, 2024        Rakel Shaw  [address] [address] [facility] [address]          To whom it may concern:    RE: Rakel Shaw    {:[id number]}    Please contact me for questions or concerns.      Sincerely,      No att. providers found

## 2024-04-25 NOTE — LETTER
04/26/24      To Whom it may concern:    Rakel Shaw had surgery 04/26/24. She required a family member to stay with and assist her with her recovery from 4/26/24 through 4/30/24.  Please excuse Nicholas Dhillon from work during this time.      Sincerely,    Dr. Musa Figueroa MD

## 2024-04-26 ENCOUNTER — ANESTHESIA EVENT (OUTPATIENT)
Dept: SURGERY | Facility: CLINIC | Age: 34
DRG: 041 | End: 2024-04-26
Payer: COMMERCIAL

## 2024-04-26 ENCOUNTER — APPOINTMENT (OUTPATIENT)
Dept: GENERAL RADIOLOGY | Facility: CLINIC | Age: 34
DRG: 041 | End: 2024-04-26
Attending: SURGERY
Payer: COMMERCIAL

## 2024-04-26 ENCOUNTER — APPOINTMENT (OUTPATIENT)
Dept: GENERAL RADIOLOGY | Facility: CLINIC | Age: 34
DRG: 041 | End: 2024-04-26
Attending: HOSPITALIST
Payer: COMMERCIAL

## 2024-04-26 ENCOUNTER — APPOINTMENT (OUTPATIENT)
Dept: SURGERY | Facility: PHYSICIAN GROUP | Age: 34
End: 2024-04-26
Payer: COMMERCIAL

## 2024-04-26 ENCOUNTER — ANESTHESIA (OUTPATIENT)
Dept: SURGERY | Facility: CLINIC | Age: 34
DRG: 041 | End: 2024-04-26
Payer: COMMERCIAL

## 2024-04-26 PROBLEM — I82.890 JUGULAR VEIN THROMBOSIS: Status: ACTIVE | Noted: 2024-04-26

## 2024-04-26 LAB
HCG UR QL: NEGATIVE
UFH PPP CHRO-ACNC: 0.55 IU/ML
UFH PPP CHRO-ACNC: 0.95 IU/ML

## 2024-04-26 PROCEDURE — 999N000065 XR CHEST PORT 1 VIEW

## 2024-04-26 PROCEDURE — 85520 HEPARIN ASSAY: CPT | Performed by: INTERNAL MEDICINE

## 2024-04-26 PROCEDURE — 99222 1ST HOSP IP/OBS MODERATE 55: CPT | Mod: 57 | Performed by: SURGERY

## 2024-04-26 PROCEDURE — 31615 TRCHEOBRNCHSC EST TRACHS INC: CPT | Performed by: ANESTHESIOLOGY

## 2024-04-26 PROCEDURE — 250N000013 HC RX MED GY IP 250 OP 250 PS 637: Performed by: INTERNAL MEDICINE

## 2024-04-26 PROCEDURE — 250N000011 HC RX IP 250 OP 636: Performed by: SURGERY

## 2024-04-26 PROCEDURE — 99418 PROLNG IP/OBS E/M EA 15 MIN: CPT | Performed by: INTERNAL MEDICINE

## 2024-04-26 PROCEDURE — 120N000001 HC R&B MED SURG/OB

## 2024-04-26 PROCEDURE — 258N000003 HC RX IP 258 OP 636: Performed by: ANESTHESIOLOGY

## 2024-04-26 PROCEDURE — 36415 COLL VENOUS BLD VENIPUNCTURE: CPT | Performed by: INTERNAL MEDICINE

## 2024-04-26 PROCEDURE — 31615 TRCHEOBRNCHSC EST TRACHS INC: CPT | Performed by: NURSE ANESTHETIST, CERTIFIED REGISTERED

## 2024-04-26 PROCEDURE — 99223 1ST HOSP IP/OBS HIGH 75: CPT | Performed by: INTERNAL MEDICINE

## 2024-04-26 PROCEDURE — 272N000001 HC OR GENERAL SUPPLY STERILE: Performed by: SURGERY

## 2024-04-26 PROCEDURE — 360N000077 HC SURGERY LEVEL 4, PER MIN: Performed by: SURGERY

## 2024-04-26 PROCEDURE — 250N000009 HC RX 250: Performed by: ANESTHESIOLOGY

## 2024-04-26 PROCEDURE — 21615 EXCISION 1ST &/CERVICAL RIB: CPT | Mod: LT | Performed by: SURGERY

## 2024-04-26 PROCEDURE — 999N000141 HC STATISTIC PRE-PROCEDURE NURSING ASSESSMENT: Performed by: SURGERY

## 2024-04-26 PROCEDURE — 370N000017 HC ANESTHESIA TECHNICAL FEE, PER MIN: Performed by: SURGERY

## 2024-04-26 PROCEDURE — 99207 PR APP CREDIT; MD BILLING SHARED VISIT: CPT | Performed by: INTERNAL MEDICINE

## 2024-04-26 PROCEDURE — 250N000011 HC RX IP 250 OP 636: Performed by: STUDENT IN AN ORGANIZED HEALTH CARE EDUCATION/TRAINING PROGRAM

## 2024-04-26 PROCEDURE — 250N000013 HC RX MED GY IP 250 OP 250 PS 637: Performed by: STUDENT IN AN ORGANIZED HEALTH CARE EDUCATION/TRAINING PROGRAM

## 2024-04-26 PROCEDURE — 250N000011 HC RX IP 250 OP 636: Performed by: INTERNAL MEDICINE

## 2024-04-26 PROCEDURE — 710N000009 HC RECOVERY PHASE 1, LEVEL 1, PER MIN: Performed by: SURGERY

## 2024-04-26 PROCEDURE — 258N000003 HC RX IP 258 OP 636: Performed by: STUDENT IN AN ORGANIZED HEALTH CARE EDUCATION/TRAINING PROGRAM

## 2024-04-26 PROCEDURE — 71045 X-RAY EXAM CHEST 1 VIEW: CPT

## 2024-04-26 PROCEDURE — 250N000011 HC RX IP 250 OP 636: Performed by: ANESTHESIOLOGY

## 2024-04-26 PROCEDURE — 81025 URINE PREGNANCY TEST: CPT | Performed by: ANESTHESIOLOGY

## 2024-04-26 PROCEDURE — 01N30ZZ RELEASE BRACHIAL PLEXUS, OPEN APPROACH: ICD-10-PCS | Performed by: SURGERY

## 2024-04-26 RX ORDER — DEXAMETHASONE SODIUM PHOSPHATE 4 MG/ML
INJECTION, SOLUTION INTRA-ARTICULAR; INTRALESIONAL; INTRAMUSCULAR; INTRAVENOUS; SOFT TISSUE PRN
Status: DISCONTINUED | OUTPATIENT
Start: 2024-04-26 | End: 2024-04-26

## 2024-04-26 RX ORDER — NALOXONE HYDROCHLORIDE 0.4 MG/ML
0.2 INJECTION, SOLUTION INTRAMUSCULAR; INTRAVENOUS; SUBCUTANEOUS
Status: DISCONTINUED | OUTPATIENT
Start: 2024-04-26 | End: 2024-04-28 | Stop reason: HOSPADM

## 2024-04-26 RX ORDER — OXYCODONE HYDROCHLORIDE 5 MG/1
5 TABLET ORAL EVERY 4 HOURS PRN
Status: DISCONTINUED | OUTPATIENT
Start: 2024-04-26 | End: 2024-04-28 | Stop reason: HOSPADM

## 2024-04-26 RX ORDER — PROPOFOL 10 MG/ML
INJECTION, EMULSION INTRAVENOUS PRN
Status: DISCONTINUED | OUTPATIENT
Start: 2024-04-26 | End: 2024-04-26

## 2024-04-26 RX ORDER — BUPIVACAINE HYDROCHLORIDE 5 MG/ML
INJECTION, SOLUTION EPIDURAL; INTRACAUDAL
Status: DISCONTINUED
Start: 2024-04-26 | End: 2024-04-26 | Stop reason: HOSPADM

## 2024-04-26 RX ORDER — ONDANSETRON 2 MG/ML
INJECTION INTRAMUSCULAR; INTRAVENOUS PRN
Status: DISCONTINUED | OUTPATIENT
Start: 2024-04-26 | End: 2024-04-26

## 2024-04-26 RX ORDER — ONDANSETRON 4 MG/1
4 TABLET, ORALLY DISINTEGRATING ORAL EVERY 30 MIN PRN
Status: DISCONTINUED | OUTPATIENT
Start: 2024-04-26 | End: 2024-04-26 | Stop reason: HOSPADM

## 2024-04-26 RX ORDER — FENTANYL CITRATE 50 UG/ML
INJECTION, SOLUTION INTRAMUSCULAR; INTRAVENOUS PRN
Status: DISCONTINUED | OUTPATIENT
Start: 2024-04-26 | End: 2024-04-26

## 2024-04-26 RX ORDER — HYDROMORPHONE HCL IN WATER/PF 6 MG/30 ML
0.2 PATIENT CONTROLLED ANALGESIA SYRINGE INTRAVENOUS EVERY 5 MIN PRN
Status: DISCONTINUED | OUTPATIENT
Start: 2024-04-26 | End: 2024-04-26 | Stop reason: HOSPADM

## 2024-04-26 RX ORDER — SODIUM CHLORIDE, SODIUM LACTATE, POTASSIUM CHLORIDE, CALCIUM CHLORIDE 600; 310; 30; 20 MG/100ML; MG/100ML; MG/100ML; MG/100ML
INJECTION, SOLUTION INTRAVENOUS CONTINUOUS
Status: DISCONTINUED | OUTPATIENT
Start: 2024-04-26 | End: 2024-04-26 | Stop reason: HOSPADM

## 2024-04-26 RX ORDER — FENTANYL CITRATE 0.05 MG/ML
25 INJECTION, SOLUTION INTRAMUSCULAR; INTRAVENOUS EVERY 5 MIN PRN
Status: DISCONTINUED | OUTPATIENT
Start: 2024-04-26 | End: 2024-04-26 | Stop reason: HOSPADM

## 2024-04-26 RX ORDER — ALBUTEROL SULFATE 0.83 MG/ML
2.5 SOLUTION RESPIRATORY (INHALATION) EVERY 4 HOURS PRN
Status: DISCONTINUED | OUTPATIENT
Start: 2024-04-26 | End: 2024-04-26 | Stop reason: HOSPADM

## 2024-04-26 RX ORDER — ONDANSETRON 2 MG/ML
4 INJECTION INTRAMUSCULAR; INTRAVENOUS EVERY 30 MIN PRN
Status: DISCONTINUED | OUTPATIENT
Start: 2024-04-26 | End: 2024-04-26 | Stop reason: HOSPADM

## 2024-04-26 RX ORDER — AMOXICILLIN 250 MG
1 CAPSULE ORAL 2 TIMES DAILY PRN
Status: DISCONTINUED | OUTPATIENT
Start: 2024-04-26 | End: 2024-04-28 | Stop reason: HOSPADM

## 2024-04-26 RX ORDER — ESCITALOPRAM OXALATE 10 MG/1
10 TABLET ORAL EVERY EVENING
Status: DISCONTINUED | OUTPATIENT
Start: 2024-04-26 | End: 2024-04-26

## 2024-04-26 RX ORDER — SODIUM CHLORIDE, SODIUM LACTATE, POTASSIUM CHLORIDE, CALCIUM CHLORIDE 600; 310; 30; 20 MG/100ML; MG/100ML; MG/100ML; MG/100ML
INJECTION, SOLUTION INTRAVENOUS CONTINUOUS
Status: DISCONTINUED | OUTPATIENT
Start: 2024-04-26 | End: 2024-04-28 | Stop reason: HOSPADM

## 2024-04-26 RX ORDER — KETOROLAC TROMETHAMINE 30 MG/ML
30 INJECTION, SOLUTION INTRAMUSCULAR; INTRAVENOUS EVERY 6 HOURS
Qty: 4 ML | Refills: 0 | Status: COMPLETED | OUTPATIENT
Start: 2024-04-26 | End: 2024-04-27

## 2024-04-26 RX ORDER — HYDROMORPHONE HYDROCHLORIDE 1 MG/ML
0.3 INJECTION, SOLUTION INTRAMUSCULAR; INTRAVENOUS; SUBCUTANEOUS
Status: DISCONTINUED | OUTPATIENT
Start: 2024-04-26 | End: 2024-04-28 | Stop reason: HOSPADM

## 2024-04-26 RX ORDER — FENTANYL CITRATE 0.05 MG/ML
50 INJECTION, SOLUTION INTRAMUSCULAR; INTRAVENOUS EVERY 5 MIN PRN
Status: DISCONTINUED | OUTPATIENT
Start: 2024-04-26 | End: 2024-04-26 | Stop reason: HOSPADM

## 2024-04-26 RX ORDER — DEXMEDETOMIDINE HYDROCHLORIDE 4 UG/ML
INJECTION, SOLUTION INTRAVENOUS PRN
Status: DISCONTINUED | OUTPATIENT
Start: 2024-04-26 | End: 2024-04-26

## 2024-04-26 RX ORDER — OXYCODONE HYDROCHLORIDE 5 MG/1
5 TABLET ORAL
Status: DISCONTINUED | OUTPATIENT
Start: 2024-04-26 | End: 2024-04-26 | Stop reason: HOSPADM

## 2024-04-26 RX ORDER — NALOXONE HYDROCHLORIDE 0.4 MG/ML
0.4 INJECTION, SOLUTION INTRAMUSCULAR; INTRAVENOUS; SUBCUTANEOUS
Status: DISCONTINUED | OUTPATIENT
Start: 2024-04-26 | End: 2024-04-28 | Stop reason: HOSPADM

## 2024-04-26 RX ORDER — ACETAMINOPHEN 325 MG/1
650 TABLET ORAL EVERY 4 HOURS PRN
Status: DISCONTINUED | OUTPATIENT
Start: 2024-04-26 | End: 2024-04-28 | Stop reason: HOSPADM

## 2024-04-26 RX ORDER — BUPIVACAINE HYDROCHLORIDE 5 MG/ML
INJECTION, SOLUTION PERINEURAL PRN
Status: DISCONTINUED | OUTPATIENT
Start: 2024-04-26 | End: 2024-04-26 | Stop reason: HOSPADM

## 2024-04-26 RX ORDER — ACETAMINOPHEN 650 MG/1
650 SUPPOSITORY RECTAL EVERY 4 HOURS PRN
Status: DISCONTINUED | OUTPATIENT
Start: 2024-04-26 | End: 2024-04-28 | Stop reason: HOSPADM

## 2024-04-26 RX ORDER — OXYCODONE HYDROCHLORIDE 5 MG/1
10 TABLET ORAL
Status: DISCONTINUED | OUTPATIENT
Start: 2024-04-26 | End: 2024-04-26 | Stop reason: HOSPADM

## 2024-04-26 RX ORDER — ESCITALOPRAM OXALATE 10 MG/1
10 TABLET ORAL EVERY EVENING
Status: DISCONTINUED | OUTPATIENT
Start: 2024-04-26 | End: 2024-04-28 | Stop reason: HOSPADM

## 2024-04-26 RX ORDER — FENTANYL CITRATE 0.05 MG/ML
25 INJECTION, SOLUTION INTRAMUSCULAR; INTRAVENOUS
Status: DISCONTINUED | OUTPATIENT
Start: 2024-04-26 | End: 2024-04-26 | Stop reason: HOSPADM

## 2024-04-26 RX ORDER — LABETALOL HYDROCHLORIDE 5 MG/ML
10 INJECTION, SOLUTION INTRAVENOUS
Status: DISCONTINUED | OUTPATIENT
Start: 2024-04-26 | End: 2024-04-26 | Stop reason: HOSPADM

## 2024-04-26 RX ORDER — CALCIUM CARBONATE 500 MG/1
1000 TABLET, CHEWABLE ORAL 4 TIMES DAILY PRN
Status: DISCONTINUED | OUTPATIENT
Start: 2024-04-26 | End: 2024-04-28 | Stop reason: HOSPADM

## 2024-04-26 RX ORDER — LIDOCAINE 4 G/G
1 PATCH TOPICAL
Status: DISCONTINUED | OUTPATIENT
Start: 2024-04-26 | End: 2024-04-28 | Stop reason: HOSPADM

## 2024-04-26 RX ORDER — NALOXONE HYDROCHLORIDE 0.4 MG/ML
0.1 INJECTION, SOLUTION INTRAMUSCULAR; INTRAVENOUS; SUBCUTANEOUS
Status: DISCONTINUED | OUTPATIENT
Start: 2024-04-26 | End: 2024-04-26 | Stop reason: HOSPADM

## 2024-04-26 RX ORDER — PROPOFOL 10 MG/ML
INJECTION, EMULSION INTRAVENOUS CONTINUOUS PRN
Status: DISCONTINUED | OUTPATIENT
Start: 2024-04-26 | End: 2024-04-26

## 2024-04-26 RX ORDER — BISACODYL 10 MG
10 SUPPOSITORY, RECTAL RECTAL DAILY PRN
Status: DISCONTINUED | OUTPATIENT
Start: 2024-04-26 | End: 2024-04-28 | Stop reason: HOSPADM

## 2024-04-26 RX ORDER — KETOROLAC TROMETHAMINE 30 MG/ML
INJECTION, SOLUTION INTRAMUSCULAR; INTRAVENOUS PRN
Status: DISCONTINUED | OUTPATIENT
Start: 2024-04-26 | End: 2024-04-26

## 2024-04-26 RX ORDER — MEPERIDINE HYDROCHLORIDE 25 MG/ML
12.5 INJECTION INTRAMUSCULAR; INTRAVENOUS; SUBCUTANEOUS EVERY 5 MIN PRN
Status: DISCONTINUED | OUTPATIENT
Start: 2024-04-26 | End: 2024-04-26 | Stop reason: HOSPADM

## 2024-04-26 RX ORDER — METHOCARBAMOL 500 MG/1
500 TABLET, FILM COATED ORAL 4 TIMES DAILY
Status: DISCONTINUED | OUTPATIENT
Start: 2024-04-26 | End: 2024-04-28 | Stop reason: HOSPADM

## 2024-04-26 RX ORDER — HEPARIN SODIUM 10000 [USP'U]/100ML
0-5000 INJECTION, SOLUTION INTRAVENOUS CONTINUOUS
Status: DISCONTINUED | OUTPATIENT
Start: 2024-04-26 | End: 2024-04-26

## 2024-04-26 RX ORDER — CEFAZOLIN SODIUM/WATER 2 G/20 ML
2 SYRINGE (ML) INTRAVENOUS
Status: COMPLETED | OUTPATIENT
Start: 2024-04-26 | End: 2024-04-26

## 2024-04-26 RX ORDER — LIDOCAINE 40 MG/G
CREAM TOPICAL
Status: DISCONTINUED | OUTPATIENT
Start: 2024-04-26 | End: 2024-04-28 | Stop reason: HOSPADM

## 2024-04-26 RX ORDER — HYDROMORPHONE HCL IN WATER/PF 6 MG/30 ML
0.4 PATIENT CONTROLLED ANALGESIA SYRINGE INTRAVENOUS EVERY 5 MIN PRN
Status: DISCONTINUED | OUTPATIENT
Start: 2024-04-26 | End: 2024-04-26 | Stop reason: HOSPADM

## 2024-04-26 RX ORDER — OXYCODONE HYDROCHLORIDE 5 MG/1
10 TABLET ORAL EVERY 4 HOURS PRN
Status: DISCONTINUED | OUTPATIENT
Start: 2024-04-26 | End: 2024-04-28 | Stop reason: HOSPADM

## 2024-04-26 RX ORDER — ACETAMINOPHEN 325 MG/1
650 TABLET ORAL EVERY 6 HOURS
Status: DISCONTINUED | OUTPATIENT
Start: 2024-04-26 | End: 2024-04-28 | Stop reason: HOSPADM

## 2024-04-26 RX ORDER — OXYCODONE HYDROCHLORIDE 5 MG/1
5 TABLET ORAL EVERY 4 HOURS PRN
Status: DISCONTINUED | OUTPATIENT
Start: 2024-04-26 | End: 2024-04-26

## 2024-04-26 RX ORDER — HYDRALAZINE HYDROCHLORIDE 20 MG/ML
2.5-5 INJECTION INTRAMUSCULAR; INTRAVENOUS EVERY 10 MIN PRN
Status: DISCONTINUED | OUTPATIENT
Start: 2024-04-26 | End: 2024-04-26 | Stop reason: HOSPADM

## 2024-04-26 RX ORDER — AMOXICILLIN 250 MG
2 CAPSULE ORAL 2 TIMES DAILY PRN
Status: DISCONTINUED | OUTPATIENT
Start: 2024-04-26 | End: 2024-04-28 | Stop reason: HOSPADM

## 2024-04-26 RX ORDER — HYDROMORPHONE HCL IN WATER/PF 6 MG/30 ML
0.2 PATIENT CONTROLLED ANALGESIA SYRINGE INTRAVENOUS
Status: DISCONTINUED | OUTPATIENT
Start: 2024-04-26 | End: 2024-04-26

## 2024-04-26 RX ORDER — HYDROMORPHONE HCL IN WATER/PF 6 MG/30 ML
0.4 PATIENT CONTROLLED ANALGESIA SYRINGE INTRAVENOUS
Status: DISCONTINUED | OUTPATIENT
Start: 2024-04-26 | End: 2024-04-26

## 2024-04-26 RX ORDER — POLYETHYLENE GLYCOL 3350 17 G/17G
17 POWDER, FOR SOLUTION ORAL 2 TIMES DAILY PRN
Status: DISCONTINUED | OUTPATIENT
Start: 2024-04-26 | End: 2024-04-28 | Stop reason: HOSPADM

## 2024-04-26 RX ADMIN — DEXMEDETOMIDINE HYDROCHLORIDE 8 MCG: 200 INJECTION INTRAVENOUS at 15:43

## 2024-04-26 RX ADMIN — HYDROMORPHONE HYDROCHLORIDE 0.5 MG: 1 INJECTION, SOLUTION INTRAMUSCULAR; INTRAVENOUS; SUBCUTANEOUS at 15:37

## 2024-04-26 RX ADMIN — ESCITALOPRAM OXALATE 10 MG: 10 TABLET ORAL at 22:07

## 2024-04-26 RX ADMIN — MIDAZOLAM 2 MG: 1 INJECTION INTRAMUSCULAR; INTRAVENOUS at 15:07

## 2024-04-26 RX ADMIN — FENTANYL CITRATE 50 MCG: 50 INJECTION, SOLUTION INTRAMUSCULAR; INTRAVENOUS at 17:55

## 2024-04-26 RX ADMIN — PROPOFOL 40 MG: 10 INJECTION, EMULSION INTRAVENOUS at 17:03

## 2024-04-26 RX ADMIN — HEPARIN SODIUM 1650 UNITS/HR: 10000 INJECTION, SOLUTION INTRAVENOUS at 02:23

## 2024-04-26 RX ADMIN — ROCURONIUM BROMIDE 50 MG: 50 INJECTION, SOLUTION INTRAVENOUS at 15:12

## 2024-04-26 RX ADMIN — OXYCODONE HYDROCHLORIDE 2.5 MG: 5 TABLET ORAL at 12:42

## 2024-04-26 RX ADMIN — SODIUM CHLORIDE, POTASSIUM CHLORIDE, SODIUM LACTATE AND CALCIUM CHLORIDE: 600; 310; 30; 20 INJECTION, SOLUTION INTRAVENOUS at 19:54

## 2024-04-26 RX ADMIN — KETOROLAC TROMETHAMINE 30 MG: 30 INJECTION, SOLUTION INTRAMUSCULAR at 16:47

## 2024-04-26 RX ADMIN — PROPOFOL 150 MCG/KG/MIN: 10 INJECTION, EMULSION INTRAVENOUS at 15:16

## 2024-04-26 RX ADMIN — OXYCODONE HYDROCHLORIDE 2.5 MG: 5 TABLET ORAL at 01:58

## 2024-04-26 RX ADMIN — PROPOFOL 200 MG: 10 INJECTION, EMULSION INTRAVENOUS at 15:12

## 2024-04-26 RX ADMIN — SUGAMMADEX 200 MG: 100 INJECTION, SOLUTION INTRAVENOUS at 17:04

## 2024-04-26 RX ADMIN — ACETAMINOPHEN 650 MG: 325 TABLET, FILM COATED ORAL at 01:59

## 2024-04-26 RX ADMIN — ACETAMINOPHEN 650 MG: 325 TABLET, FILM COATED ORAL at 19:55

## 2024-04-26 RX ADMIN — FENTANYL CITRATE 100 MCG: 50 INJECTION INTRAMUSCULAR; INTRAVENOUS at 15:12

## 2024-04-26 RX ADMIN — DEXMEDETOMIDINE HYDROCHLORIDE 8 MCG: 200 INJECTION INTRAVENOUS at 17:03

## 2024-04-26 RX ADMIN — ESCITALOPRAM OXALATE 10 MG: 10 TABLET ORAL at 01:59

## 2024-04-26 RX ADMIN — DEXAMETHASONE SODIUM PHOSPHATE 4 MG: 4 INJECTION, SOLUTION INTRA-ARTICULAR; INTRALESIONAL; INTRAMUSCULAR; INTRAVENOUS; SOFT TISSUE at 15:26

## 2024-04-26 RX ADMIN — Medication 2 G: at 15:12

## 2024-04-26 RX ADMIN — METHOCARBAMOL 500 MG: 500 TABLET ORAL at 19:56

## 2024-04-26 RX ADMIN — DEXMEDETOMIDINE HYDROCHLORIDE 12 MCG: 200 INJECTION INTRAVENOUS at 15:24

## 2024-04-26 RX ADMIN — SODIUM CHLORIDE, POTASSIUM CHLORIDE, SODIUM LACTATE AND CALCIUM CHLORIDE: 600; 310; 30; 20 INJECTION, SOLUTION INTRAVENOUS at 14:33

## 2024-04-26 RX ADMIN — ONDANSETRON 4 MG: 2 INJECTION INTRAMUSCULAR; INTRAVENOUS at 16:47

## 2024-04-26 RX ADMIN — FENTANYL CITRATE 50 MCG: 50 INJECTION, SOLUTION INTRAMUSCULAR; INTRAVENOUS at 18:04

## 2024-04-26 RX ADMIN — ACETAMINOPHEN 650 MG: 325 TABLET, FILM COATED ORAL at 12:42

## 2024-04-26 RX ADMIN — HYDROMORPHONE HYDROCHLORIDE 0.5 MG: 1 INJECTION, SOLUTION INTRAMUSCULAR; INTRAVENOUS; SUBCUTANEOUS at 15:57

## 2024-04-26 RX ADMIN — LIDOCAINE 1 PATCH: 4 PATCH TOPICAL at 19:56

## 2024-04-26 RX ADMIN — KETOROLAC TROMETHAMINE 30 MG: 30 INJECTION, SOLUTION INTRAMUSCULAR; INTRAVENOUS at 22:07

## 2024-04-26 ASSESSMENT — ACTIVITIES OF DAILY LIVING (ADL)
ADLS_ACUITY_SCORE: 21
ADLS_ACUITY_SCORE: 20
ADLS_ACUITY_SCORE: 23
ADLS_ACUITY_SCORE: 20
ADLS_ACUITY_SCORE: 20
ADLS_ACUITY_SCORE: 21
ADLS_ACUITY_SCORE: 20
ADLS_ACUITY_SCORE: 21
ADLS_ACUITY_SCORE: 20
ADLS_ACUITY_SCORE: 21
ADLS_ACUITY_SCORE: 20
ADLS_ACUITY_SCORE: 21
ADLS_ACUITY_SCORE: 23
ADLS_ACUITY_SCORE: 20
ADLS_ACUITY_SCORE: 20
ADLS_ACUITY_SCORE: 21
ADLS_ACUITY_SCORE: 21

## 2024-04-26 NOTE — OR NURSING
Dr Figueroa at bedside.  Per MD, he will call general surgery later to say when to restart heparin gtt.

## 2024-04-26 NOTE — H&P
[facility]    History and Physical - Hospitalist Service       Date of Admission:  4/25/2024    Assessment & Plan      Rakel Shaw is a 33 year old female admitted on 4/25/2024. She presents as a direct admission from [facility] [place] emergency department after she was found to have a left subclavian and distal IJ thrombus in the setting of several months of left shoulder and arm discomfort with minimally elevated CK being followed by her primary care team.    Left upper extremity medial subclavian, distal left IJ DVT: Approximately 2 months of pain, occasional numbness down her left arm.  Unaware of any trauma.  Does have a more distant history of rhabdomyolysis after [employer] (2019).  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  Recently had hepatitis vaccinations, but symptoms of pain preceded this by months.  No family history of clotting.  -Heparin infusion  -Interventional radiology consulted, aware of patient from outside emergency department  -Thoracic surgery consulted given concern for venous thoracic outlet syndrome.  Consider CT neck/chest to evaluate potential causes of extrinsic vascular insufficiency as well as patient's described lymphadenopathy, but will await consult first.  Surgical teams to be aware that patient reports her sister had malignant hyperthermia history.  -I did briefly discuss hypercoagulable workup and how this would require consent for genetic screening.  She is agreeable if needed.  Prior to sending hypercoagulable workup, will await thoracic surgery input.  If felt not to be consistent with venous thoracic outlet syndrome, can send Antithrombin, protein C, protein S, factor V Leiden, prothrombin, antiphospholipid antibody.  If pursued, she is willing to  "sign consent form  -Note that patient has follow-up with rheumatology as an outpatient  -Stop Elly birth control    PCOS: Not on medications for this.  Reports regular menstrual cycles, was on metformin briefly but this caused GI upset so discontinued.    Postpartum depression:  -Continue prior to admission Lexapro    Microscopic hematuria: 8-10 red cells noted on urinalysis through outside system 4/25/2024, but also with some measurable red cells on an otherwise contaminated urine specimen from November 2023..  -Outpatient urology follow-up for consideration of cystoscopy.          Diet: NPO per Anesthesia Guidelines for Procedure/Surgery Except for: Meds    DVT Prophylaxis: heparin infusion  Malagon Catheter: Not present  Lines: None     Cardiac Monitoring: None  Code Status:  full code    Clinically Significant Risk Factors Present on Admission                       # Obesity: Estimated body mass index is 30.73 kg/m  as calculated from the following:    Height as of an earlier encounter on 4/25/24: 1.702 m (5' 7\").    Weight as of this encounter: 89 kg (196 lb 3.4 oz).              Disposition Plan     Medically Ready for Discharge: Anticipated Tomorrow           Godwin Myers MD  Hospitalist Service  [facility]  Securely message with [employer] (more info)  Text page via [facility] Paging/Directory     ______________________________________________________________________    Chief Complaint   Left arm pain    History is obtained from the patient, chart review, patient's  at bedside, review of outside records including prior laboratory studies    History of Present Illness   Rakel Shaw is a 33 year old female who presents as direct admission from [facility] emergency department where she was found to have a median subclavian and distal left IJ nonocclusive thrombus on ultrasound.    Patient has been experiencing some left shoulder and arm discomfort with occasional shooting nerve discomfort " for the past 2 months or so.  She felt that this discomfort vaguely reminded her of when she had some mild rhabdomyolysis after doing CrossFit in 2019.  Recently had a primary care visit where she mentioned this discomfort and workup was started.  She was found again to have a very mildly elevated CK level which led to follow-up, subsequent rheumatology referral.  Rheumatology recommended additional studies including a D-dimer which was obtained and elevated.  She was then contacted and told to go to the emergency department.  Ultrasound in the emergency department demonstrated median subclavian and distal left IJ nonocclusive thrombus.  Transferred to [facility] after interventional radiology was contacted for recommended thrombectomy in a.m.    In May, patient had a healthy baby.  Pregnancy was complicated by mild preeclampsia requiring delivery at 37 weeks as well as postpartum depression.  Patient is no longer breast-feeding.  Patient reports that she and her  had some difficulty getting pregnant, but this was attributed to issues with her .  She has PCOS, but regular menstrual cycles.  No history of miscarriages.    No family history of blood clots, but tells me that her sister, who had thyroid cancer and malignant hyperthermia during her thyroidectomy surgery, has some issues with increased bleeding after procedures.  She is uncertain if she has a factor deficiency or other diagnosis associated with this.    Patient cannot identify any trauma or increased activity which precipitated her pain, but again, discomfort started several months ago.  She has not had significant swelling of her left upper extremity and tells me that she has still been able to wear her rings without difficulty.    Patient does tell me that since COVID immunization a few years ago, whenever she gets a vaccine, she will have some subjective lymphadenopathy of her left neck and supraclavicular area.  Currently has  what may be a supraclavicular lymph node, though not enlarged, on the left.  She tells me that she had vaccines against hepatitis a few days ago which resulted in this.  Note that she had an ultrasound in May 2021 to evaluate this, but no nodes were felt to represent pathologic adenopathy, all within normal size limits.    Born in the [place].  Parents were [place] born.  Father with early coronary artery disease and heart attack with stent in his late 40s.  Mother had difficulty waking up from anesthesia after cholecystectomy.  1 of 4 sisters with thyroid cancer and reported malignant hyperthermia during her thyroidectomy.          Past Medical History    Past Medical History:   Diagnosis Date    Anxiety     IUD (intrauterine device) in place 08/16/2023    Placed 8/16/2023     Type: Mirena  LOT [id number]  Remove/Replace by: 8/16/2031     Expiration Date:  5/2026    Mild pre-eclampsia 05/09/2023    PCOS (polycystic ovarian syndrome) 2021    Uncomplicated asthma     as child       Past Surgical History   Past Surgical History:   Procedure Laterality Date    HEAD & NECK SURGERY      teeth extraction younger       Prior to Admission Medications   Prior to Admission Medications   Prescriptions Last Dose Informant Patient Reported? Taking?   drospirenone-ethinyl estradiol (RADHA) 3-0.02 MG tablet   Yes No   Sig: Take 1 tablet by mouth at bedtime   escitalopram (LEXAPRO) 10 MG tablet   Yes No   Sig: Take 10 mg by mouth at bedtime      Facility-Administered Medications: None           Physical Exam   Vital Signs: Temp: 98.3  F (36.8  C) Temp src: Oral BP: 128/85 Pulse: 67   Resp: 16 SpO2: 100 %      Weight: 196 lbs 3.35 oz    General Appearance: Obese, generally well-appearing 33-year-old female resting comfortably in bed.  Eyes: No scleral icterus or injection  HEENT: Normocephalic and atraumatic  Respiratory:  breath sounds are clear bilaterally to auscultation no wheezes or crackles.  Cardiovascular: Regular  rate and rhythm, no murmur  GI: Abdomen soft, nontender palpation.  No palpable mass.  Lymph/Hematologic: No lower extremity edema, no upper extremity edema.  Appears to have an approximately 1 cm palpable lymph node above left clavicle, mid clavicular, deep  Musculoskeletal: Muscular tone and bulk intact in all extremities and appropriate for age.  No visible deformity associated with her left shoulder discomfort  Neurologic: Alert, conversant, appropriate conversation.  Mental status grossly intact.  Psychiatric: Very pleasant, normal affect    Medical Decision Making       65 MINUTES SPENT BY ME on the date of service doing chart review, history, exam, documentation & further activities per the note.      Data     I have personally reviewed the following data over the past 24 hrs:    11.2 (H)  \   12.6   / 407     138 103 10.9 /  121 (H)   3.5 23 0.75 \       Imaging results reviewed over the past 24 hrs:   Recent Results (from the past 24 hour(s))   US Upper Extremity Venous Duplex Left    Narrative    EXAM: US UPPER EXTREMITY VENOUS DUPLEX LEFT  LOCATION: [facility]  DATE: 4/25/2024    INDICATION: pain, elevated d dimer (outpatient test)  COMPARISON: None.  TECHNIQUE: Venous Duplex ultrasound of the left upper extremity with (when possible) and without compression, augmentation, and duplex. Color flow and spectral Doppler with waveform analysis performed.    FINDINGS: Ultrasound includes evaluation of the internal jugular vein, innominate vein, subclavian vein, axillary vein, and brachial vein. The superficial cephalic and basilic veins were also evaluated where seen.     LEFT: There is nonocclusive thrombus in the distal left internal jugular vein extending into the medial subclavian vein. Remaining deep veins of the left upper extremity are patent. No superficial thrombophlebitis.       Impression    IMPRESSION:   1.  Left upper extremity DVT as above.

## 2024-04-26 NOTE — ANESTHESIA CARE TRANSFER NOTE
Patient: Rakel Shaw    Procedure: Procedure(s):  EXCISION, RIB, FIRST, TRANSAXILLARY APPROACH       Diagnosis: TOS (thoracic outlet syndrome) [G54.0]  Diagnosis Additional Information: No value filed.    Anesthesia Type:   General     Note:    Oropharynx: spontaneously breathing  Level of Consciousness: drowsy  Oxygen Supplementation: face mask  Level of Supplemental Oxygen (L/min / FiO2): 4  Independent Airway: airway patency satisfactory and stable  Dentition: dentition unchanged  Vital Signs Stable: post-procedure vital signs reviewed and stable  Report to RN Given: handoff report given  Patient transferred to: PACU    Handoff Report: Identifed the Patient, Identified the Reponsible Provider, Reviewed the pertinent medical history, Discussed the surgical course, Reviewed Intra-OP anesthesia mangement and issues during anesthesia, Set expectations for post-procedure period and Allowed opportunity for questions and acknowledgement of understanding      Vitals:  Vitals Value Taken Time   /80 04/26/24 1720   Temp     Pulse 80 04/26/24 1723   Resp 14 04/26/24 1723   SpO2 100 % 04/26/24 1723   Vitals shown include unfiled device data.    Electronically Signed By: JOHN Goldberg CRNA  April 26, 2024  5:25 PM

## 2024-04-26 NOTE — CONSULTS
Patient has [employer] through an employer.    Xarelto/Eliquis:  $48/mo. Upon receipt of RX Discharge Pharmacy can provide copay savings card from [employer] or [email or web address], respectively, to reduce this to $10/mo.    Tish Churchill  Pharmacy Technician/Liaison, Discharge Pharmacy   [phone] (voice or text)  [email or web address]

## 2024-04-26 NOTE — ANESTHESIA POSTPROCEDURE EVALUATION
Patient: Rakel Shaw    Procedure: Procedure(s):  EXCISION, RIB, FIRST, TRANSAXILLARY APPROACH       Anesthesia Type:  General    Note:     Postop Pain Control: Uneventful            Sign Out: Well controlled pain   PONV: No   Neuro/Psych: Uneventful            Sign Out: Acceptable/Baseline neuro status   Airway/Respiratory: Uneventful            Sign Out: Acceptable/Baseline resp. status   CV/Hemodynamics: Uneventful            Sign Out: Acceptable CV status; No obvious hypovolemia; No obvious fluid overload   Other NRE:    DID A NON-ROUTINE EVENT OCCUR? No           Last vitals:  Vitals Value Taken Time   /78 04/26/24 1725   Temp 36.1  C (97  F) 04/26/24 1720   Pulse 84 04/26/24 1728   Resp 14 04/26/24 1728   SpO2 100 % 04/26/24 1728   Vitals shown include unfiled device data.    Electronically Signed By: Sabi Redmond MD, MD  April 26, 2024  5:28 PM

## 2024-04-26 NOTE — CONSULTS
Interventional Radiology - Consult Note:  4/26/2024    Procedure Requested: L arm pain, L jugular and subclavian thrombus   Requested by: Godwin Myers MD       Brief HPI: Rakel Shaw is a 33 year old female with a past medical history significant for PCOS, asthma, and anxiety. Pt presents as a direct admission from [facility] emergency department after she was found to have a left subclavian and distal IJ thrombus in the setting of several months of left shoulder and arm discomfort with minimally elevated CK being followed by her primary care team. She is on birth control. She denies chest pain or shortness of breath. She denies swelling or color change of the left upper extremity.      IMAGING:  EXAM: US UPPER EXTREMITY VENOUS DUPLEX LEFT  LOCATION: [facility]  DATE: 4/25/2024     INDICATION: pain, elevated d dimer (outpatient test)  COMPARISON: None.  TECHNIQUE: Venous Duplex ultrasound of the left upper extremity with (when possible) and without compression, augmentation, and duplex. Color flow and spectral Doppler with waveform analysis performed.     FINDINGS: Ultrasound includes evaluation of the internal jugular vein, innominate vein, subclavian vein, axillary vein, and brachial vein. The superficial cephalic and basilic veins were also evaluated where seen.      LEFT: There is nonocclusive thrombus in the distal left internal jugular vein extending into the medial subclavian vein. Remaining deep veins of the left upper extremity are patent. No superficial thrombophlebitis.                                                                       IMPRESSION:   1.  Left upper extremity DVT as above.    NPO: Appropriately NPO  ANTICOAGULANTS: Heparin drip  ANTIBIOTICS: None    ALLERGIES  Allergies   Allergen Reactions    Anesthesia S-I-40 [Propofol]      Sister w/ malignant hyperthermia    Seafood Other (See Comments), Difficulty breathing and Rash     Fish/ Shrimp    Morphine  "Nausea and Vomiting    Pcn [Penicillins] Hives         LABS:  No results found for: \"INR\"   Hemoglobin   Date Value Ref Range Status   04/25/2024 12.6 11.7 - 15.7 g/dL Final   05/07/2021 13.3 11.7 - 15.7 g/dL Final   ]  Platelet Count   Date Value Ref Range Status   04/25/2024 407 150 - 450 10e3/uL Final   05/07/2021 336 150 - 450 10e9/L Final     Creatinine   Date Value Ref Range Status   04/25/2024 0.75 0.51 - 0.95 mg/dL Final   06/17/2019 0.61 0.52 - 1.04 mg/dL Final     Potassium   Date Value Ref Range Status   04/25/2024 3.5 3.4 - 5.3 mmol/L Final   07/08/2022 3.8 3.4 - 5.3 mmol/L Final   06/17/2019 4.0 3.4 - 5.3 mmol/L Final         EXAM:  /85 (BP Location: Right arm)   Pulse 67   Temp 98.3  F (36.8  C) (Oral)   Resp 16   Wt 89 kg (196 lb 3.4 oz)   SpO2 100%   BMI 30.73 kg/m    General:  Stable.  In no acute distress.    Neuro:  A&O x 3. Moves all extremities equally.  Resp:  breathing unlabored on room air.  Abdomen:  Soft, non-distended, non-tender, positive bowel sounds.  Vascular:  +2/4 radial pulse  Skin:  Without excoriations, ecchymosis, erythema, lesions or open sores on visible skin.  MSK:  No gross motor weakness.  Sensation intact.  Proprioception intact.      ASSESSMENT/PLAN:   33 year old female with a past medical history significant for PCOS, now admitted with L jugular and subclavian thrombus   -Clot in left subclavian and jugular veins is non-occlusive and pt does not have arm swelling, discoloration, or sensory or motor dysfunction in LUE. Dr. Davis reviewed the imaging and then discussed the case with Dr. Figueroa and Dr. Starks of vascular surgery (who also touched base with Dr. Martell of thoracic surgery).   --Given that thrombus is non-occlusive and minimally symptomatic, would not plan for endovascular IR procedure now. Recommendation per discussion with surgical teams is for first rib resection in the short-term, then venogram (outpatient) with IR in 10-14 days " post-op  -Continue Heparin drip for now, hold/resume/transition to PO per surgery recommendations  -IR will plan to follow-up outpatient per Dr. Figueroa's post-op recommendations    The patient's care was discussed with the Attending Physician, Dr. Davis, who is in agreement with the above assessment and plan    Total time: 60 minutes    Archana Wellington PA-C  Interventional Radiology  [facility] PA desk phone *[phone]

## 2024-04-26 NOTE — PLAN OF CARE
Goal Outcome Evaluation:      Plan of Care Reviewed With: patient    Overall Patient Progress: improvingOverall Patient Progress: improving         Orientation: A/O X4    Vitals/Tele: VSS on RA    IV Access/drains: L arm  limb alert, Heparin infusing at 1650 units/hr PIV; awaiting Anti Xa result,     Diet: NPO    Mobility: SBA    GI/: Cont. B/B    Wound/Skin: Dry, clear, and intact    Pain: Oxy given X1 for headache     Discharge Plan: TBD      See Flow sheets for assessment      Gaby Ruvalcaba RN on 4/26/2024 at 6:52 AM

## 2024-04-26 NOTE — ANESTHESIA PROCEDURE NOTES
Airway       Patient location during procedure: OR       Procedure Start/Stop Times: 4/26/2024 3:14 PM  Staff -        Anesthesiologist:  Sabi Redmond MD       CRNA: Izabel Huerta       Performed By: CRNA  Consent for Airway        Urgency: elective  Indications and Patient Condition       Indications for airway management: holly-procedural       Induction type:intravenous       Mask difficulty assessment: 1 - vent by mask    Final Airway Details       Final airway type: endotracheal airway       Successful airway: ETT - single and Oral  Endotracheal Airway Details        ETT size (mm): 7.0       Cuffed: yes       Successful intubation technique: direct laryngoscopy       DL Blade Type: MAC 3       Grade View of Cords: 2       Adjucts: stylet       Position: Right       Measured from: gums/teeth       Secured at (cm): 20       Bite block used: None    Post intubation assessment        Placement verified by: capnometry, equal breath sounds and chest rise        Number of attempts at approach: 1       Number of other approaches attempted: 0       Secured with: tape       Ease of procedure: easy       Dentition: Intact and Unchanged    Medication(s) Administered   Medication Administration Time: 4/26/2024 3:14 PM

## 2024-04-26 NOTE — PHARMACY-ADMISSION MEDICATION HISTORY
Pharmacy Intern Admission Medication History    Admission medication history is complete. The information provided in this note is only as accurate as the sources available at the time of the update.    Information Source(s): Patient and CareEverywhere/[employer] via in-person    Pertinent Information: None    Changes made to PTA medication list:  Added: None  Deleted: fluconazole  Changed: Everything to bedtime    Allergies reviewed with patient and updates made in EHR: yes    Medication History Completed By: Moises Aly 4/25/2024 8:59 PM    PTA Med List   Medication Sig Last Dose    drospirenone-ethinyl estradiol (RADHA) 3-0.02 MG tablet Take 1 tablet by mouth at bedtime 4/24/2024 at pm    escitalopram (LEXAPRO) 10 MG tablet Take 10 mg by mouth at bedtime 4/24/2024 at pm

## 2024-04-26 NOTE — CONSULTS
VASCULAR SURGERY    Rakel Shaw presented to the [facility] ED yesterday with left arm discomfort and swelling.  Duplex ultrasound revealed nonocclusive thrombus in the medial left subclavian vein and distal internal jugular vein.  Patient transferred to [facility] and started on intravenous heparin.    Patient notes that she has had pain in her left upper arm for approximate month more of a muscle ache.  Occasional paresthesias in her left arm but relatively infrequently and not associated with arm position.  No strenuous activity.    In 6/2019 she had weakness to her left arm with swelling.  Apparently they felt that she had rhabdomyolysis.  At that time venous ultrasound of the left arm and subclavian vein was normal.  Patient is right-handed.    PMH: Medications: BCP   Medical: History of anxiety    History of polycystic ovarian syndrome    Mild occasional asthma not requiring any treatment.    IUD in place.   Surgical: Was not teeth removal many years ago under some sort of anesthetic    Social history: .  Her and her  live in [place] [facility] [place].  He has a single child who is now 11 months old.   Never smoked    FMH: Sister has malignant hyperthermia treated here at [facility].        She had her oral surgery performed prior to sisters diagnosis--no complications           Exam: Alert and appropriate.  Normal affect.  Patient examined with  present.              Afebrile vital signs stable.    HEENT = unremarkable   Chest = clear Patient has had wisdom teeth removal in the past under general anesthetic with no complications--this is before she is aware of her sisters malignant hyperthermia histor  Cardiovascular = regular rate   No bony abnormalities to left clavicle area.    Mild left arm swelling with no significant dilated surface veins.    Good pulses in arms and legs.  Reviewed ultrasound.    Reviewed images of the ultrasound revealing partial occlusion of the medial subclavian  vein and distal left internal jugular vein.  No thrombus within the axillary vein    IMPRESSION: Vasogenic left thoracic outlet syndrome with partially occlusive DVT.  In this situation we do not feel venogram and lytic therapy/venoplasty is necessary initially since the thrombus is nonocclusive.  We feel that opening up the thoracic outlet is the most important to relieve the extrinsic compression and this has been discussed with her and her .      Musa Figueroa MD     Will keep patient on intravenous heparin.  Chest x-ray will be performed to rule out cervical rib.  Plan potential surgery later today depending on or schedule or tomorrow.      ADDENDUM: Chest x-ray completed and reviewed.  No cervical rib.  Normal clavicular and first rib anatomy      PLAN: Patient be taken to the operating room later today for a left transaxillary first rib resection and scalenectomy mobilization left subclavian vein.  Aware of the chance of a pleural tear.  All patients have a drain left.  Heparin will be stopped in preinduction and started later today.    Over 50 minutes with patient and family today    Musa Figueroa MD

## 2024-04-26 NOTE — PHARMACY-ADMISSION MEDICATION HISTORY
Admission medication history completed at [facility]. Please see Pharmacy Intern Admission Medication History note from 4/25/2024.  Margie Mack, PharmD

## 2024-04-26 NOTE — PROGRESS NOTES
[facility]    Medicine Progress Note - Hospitalist Service    Date of Admission:  4/25/2024    Assessment & Plan   Rakel Shaw is a 33 year old female admitted on 4/25/2024. She presents as a direct admission from [facility] emergency department after she was found to have a left subclavian and distal IJ thrombus in the setting of several months of left shoulder and arm discomfort with minimally elevated CK being followed by her primary care team.     Left upper extremity medial subclavian, distal left IJ DVT:  Possible thoracic outlet syndrome   Approximately 2 months of pain, occasional numbness down her left arm.  Unaware of any trauma.  Does have a more distant history of rhabdomyolysis after [employer] (2019).  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  Recently had hepatitis vaccinations, but symptoms of pain preceded this by months.  No family history of clotting.    Started on IV heparin drip for anticoagulation on admission  Vascular surgery consulted.  Planning on removal of the cervical rib contributing to thoracic outlet syndrome  IR consultation requested.  As per IR Given that thrombus is non-occlusive and minimally symptomatic, would not plan for endovascular IR procedure now. Recommendation per discussion with surgical teams is for first rib resection in the short-term, then venogram (outpatient) with IR in 10-14 days post-op       Patient is on hormonal birth control which might have contributed to the DVT which will be discontinued going forward  Discussed with patient about using alternative birth control    PCOS: Not on medications for this.  Reports regular menstrual cycles, was on metformin briefly but this caused GI upset so discontinued.     Postpartum  "depression:  -Continue prior to admission Lexapro     Microscopic hematuria: 8-10 red cells noted on urinalysis through outside system 4/25/2024, but also with some measurable red cells on an otherwise contaminated urine specimen from November 2023..  -Outpatient urology follow-up for consideration of cystoscopy.           Diet: NPO per Anesthesia Guidelines for Procedure/Surgery Except for: Meds    DVT Prophylaxis: Heparin drip  Malagon Catheter: Not present  Lines: None     Cardiac Monitoring: ACTIVE order. Indication: dvt  Code Status: Full Code      Clinically Significant Risk Factors Present on Admission                       # Obesity: Estimated body mass index is 30.73 kg/m  as calculated from the following:    Height as of an earlier encounter on 4/25/24: 1.702 m (5' 7\").    Weight as of this encounter: 89 kg (196 lb 3.4 oz).              Disposition Plan     Medically Ready for Discharge: Anticipated Tomorrow       Discussed with bedside RN patient and her  in the room      Deann Arriola MD  Hospitalist Service  [facility]  Securely message with [employer] (more info)  Text page via [employer] Paging/Directory   ______________________________________________________________________    Interval History   Patient is resting comfortably in bed.  Denies any pain currently.  No shortness of breath.  Patient to have surgery for left sided first rib resection and scalenectomy mobilization of the left subclavian vein.  N.p.o. for the surgery.  No other acute issues    Physical Exam   Vital Signs: Temp: 98.4  F (36.9  C) Temp src: Oral BP: 121/79 Pulse: 75   Resp: 16 SpO2: 97 % O2 Device: None (Room air)    Weight: 196 lbs 3.35 oz    General Appearance: Alert,  awake, not in acute distress  Respiratory: Clear to auscultation  Cardiovascular: Normal rate rhythm regular, no murmur  GI: Soft, nontender nondistended bowel sounds positive  Skin: No clubbing cyanosis or edema  Other:      Medical " Decision Making       49 MINUTES SPENT BY ME on the date of service doing chart review, history, exam, documentation & further activities per the note.      Data     I have personally reviewed the following data over the past 24 hrs:    11.2 (H)  \   12.6   / 407     138 103 10.9 /  121 (H)   3.5 23 0.75 \       Imaging results reviewed over the past 24 hrs:   Recent Results (from the past 24 hour(s))   US Upper Extremity Venous Duplex Left    Narrative    EXAM: US UPPER EXTREMITY VENOUS DUPLEX LEFT  LOCATION: [facility]  DATE: 4/25/2024    INDICATION: pain, elevated d dimer (outpatient test)  COMPARISON: None.  TECHNIQUE: Venous Duplex ultrasound of the left upper extremity with (when possible) and without compression, augmentation, and duplex. Color flow and spectral Doppler with waveform analysis performed.    FINDINGS: Ultrasound includes evaluation of the internal jugular vein, innominate vein, subclavian vein, axillary vein, and brachial vein. The superficial cephalic and basilic veins were also evaluated where seen.     LEFT: There is nonocclusive thrombus in the distal left internal jugular vein extending into the medial subclavian vein. Remaining deep veins of the left upper extremity are patent. No superficial thrombophlebitis.       Impression    IMPRESSION:   1.  Left upper extremity DVT as above.   XR Chest 1 View    Narrative    XR CHEST 1 VIEW   4/26/2024 9:37 AM     HISTORY: Thoracic outlet syndrome-  rule out cervical rib    COMPARISON: None.      Impression    IMPRESSION: No acute cardiopulmonary disease. No cervical ribs are  visualized.    MICHAEL ELIZONDO MD         SYSTEM ID:  [id number]

## 2024-04-26 NOTE — ANESTHESIA PREPROCEDURE EVALUATION
Anesthesia Pre-Procedure Evaluation    Patient: Rakel Shaw   MRN: [id number] : 1990        Procedure : Procedure(s):  EXCISION, RIB, FIRST, TRANSAXILLARY APPROACH          Past Medical History:   Diagnosis Date    Anxiety     IUD (intrauterine device) in place 2023    Placed 2023     Type: Mirena  LOT [id number]  Remove/Replace by: 2031     Expiration Date:  2026    Mild pre-eclampsia 2023    PCOS (polycystic ovarian syndrome)     Uncomplicated asthma     as child      Past Surgical History:   Procedure Laterality Date    HEAD & NECK SURGERY      teeth extraction younger      Allergies   Allergen Reactions    Anesthesia S-I-40 [Propofol]      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     History of anesthetic complications   FH of .    ROS/MED HX  ENT/Pulmonary:     (+)                      asthma               (-) sleep apnea   Neurologic:    (-) no CVA   Cardiovascular: Comment: Thoracic Outlet Syndrome    (+)  hypertension- -   -  - -                                   (-) CAD   METS/Exercise Tolerance:     Hematologic:       Musculoskeletal:       GI/Hepatic:    (-) GERD   Renal/Genitourinary:       Endo:    (-) Type II DM   Psychiatric/Substance Use:       Infectious Disease:       Malignancy:       Other:               OUTSIDE LABS:  CBC:   Lab Results   Component Value Date    WBC 11.2 (H) 2024    WBC 9.6 05/10/2023    HGB 12.6 2024    HGB 8.9 (L) 2023    HCT 37.7 2024    HCT 34.6 (L) 05/10/2023     2024     2023     BMP:   Lab Results  "  Component Value Date     04/25/2024     05/09/2023    POTASSIUM 3.5 04/25/2024    POTASSIUM 3.7 05/09/2023    CHLORIDE 103 04/25/2024    CHLORIDE 105 05/09/2023    CO2 23 04/25/2024    CO2 17 (L) 05/09/2023    BUN 10.9 04/25/2024    BUN 5.4 (L) 05/09/2023    CR 0.75 04/25/2024    CR 0.83 05/13/2023     (H) 04/25/2024     (H) 05/09/2023     COAGS: No results found for: \"PTT\", \"INR\", \"FIBR\"  POC:   Lab Results   Component Value Date    HCG Negative 04/26/2024    HCGS Negative 07/08/2022     HEPATIC:   Lab Results   Component Value Date    ALBUMIN 3.1 (L) 05/09/2023    PROTTOTAL 6.4 05/09/2023    ALT 29 05/13/2023    AST 76 (H) 05/13/2023    ALKPHOS 97 05/09/2023    BILITOTAL <0.2 05/09/2023     OTHER:   Lab Results   Component Value Date    A1C 5.2 09/29/2020    AYLEEN 9.0 04/25/2024    MAG 2.2 07/17/2021    TSH 0.44 07/17/2021    CRP <2.9 06/15/2019    SED 9 06/15/2019       Anesthesia Plan    ASA Status:  2    NPO Status:  NPO Appropriate    Anesthesia Type: General.     - Airway: ETT   Induction: Propofol, Intravenous.   Maintenance: TIVA.        Consents    Anesthesia Plan(s) and associated risks, benefits, and realistic alternatives discussed. Questions answered and patient/representative(s) expressed understanding.     - Discussed:     - Discussed with:  Patient            Postoperative Care    Pain management: Multi-modal analgesia.   PONV prophylaxis: Ondansetron (or other 5HT-3), Dexamethasone or Solumedrol, Background Propofol Infusion     Comments:    Other Comments: MH precautions.             Sabi Redmond MD, MD    I have reviewed the pertinent notes and labs in the chart from the past 30 days and (re)examined the patient.  Any updates or changes from those notes are reflected in this note.              # Overweight: Estimated body mass index is 29.91 kg/m  as calculated from the following:    Height as of this encounter: 1.702 m (5' 7\").    Weight as of this encounter: " 86.6 kg (191 lb).

## 2024-04-27 LAB — GLUCOSE BLDC GLUCOMTR-MCNC: 93 MG/DL (ref 70–99)

## 2024-04-27 PROCEDURE — 250N000013 HC RX MED GY IP 250 OP 250 PS 637: Performed by: SURGERY

## 2024-04-27 PROCEDURE — 99232 SBSQ HOSP IP/OBS MODERATE 35: CPT | Performed by: INTERNAL MEDICINE

## 2024-04-27 PROCEDURE — 250N000013 HC RX MED GY IP 250 OP 250 PS 637: Performed by: STUDENT IN AN ORGANIZED HEALTH CARE EDUCATION/TRAINING PROGRAM

## 2024-04-27 PROCEDURE — 250N000011 HC RX IP 250 OP 636: Mod: JZ | Performed by: SURGERY

## 2024-04-27 PROCEDURE — 120N000001 HC R&B MED SURG/OB

## 2024-04-27 PROCEDURE — 250N000011 HC RX IP 250 OP 636: Performed by: STUDENT IN AN ORGANIZED HEALTH CARE EDUCATION/TRAINING PROGRAM

## 2024-04-27 RX ORDER — ACETAMINOPHEN 325 MG/1
650 TABLET ORAL EVERY 4 HOURS PRN
COMMUNITY
Start: 2024-04-27 | End: 2024-07-25

## 2024-04-27 RX ORDER — HEPARIN SODIUM 10000 [USP'U]/100ML
750 INJECTION, SOLUTION INTRAVENOUS CONTINUOUS
Status: DISCONTINUED | OUTPATIENT
Start: 2024-04-27 | End: 2024-04-27

## 2024-04-27 RX ORDER — METHOCARBAMOL 500 MG/1
500 TABLET, FILM COATED ORAL 4 TIMES DAILY
Qty: 20 TABLET | Refills: 0 | Status: SHIPPED | OUTPATIENT
Start: 2024-04-27 | End: 2024-07-25

## 2024-04-27 RX ORDER — OXYCODONE HYDROCHLORIDE 5 MG/1
5 TABLET ORAL EVERY 4 HOURS PRN
Qty: 20 TABLET | Refills: 0 | Status: SHIPPED | OUTPATIENT
Start: 2024-04-27 | End: 2024-07-25

## 2024-04-27 RX ORDER — AMOXICILLIN 250 MG
1 CAPSULE ORAL 2 TIMES DAILY PRN
Qty: 40 TABLET | Refills: 0 | Status: SHIPPED | OUTPATIENT
Start: 2024-04-27 | End: 2024-07-25

## 2024-04-27 RX ADMIN — OXYCODONE HYDROCHLORIDE 5 MG: 5 TABLET ORAL at 13:20

## 2024-04-27 RX ADMIN — RIVAROXABAN 15 MG: 15 TABLET, FILM COATED ORAL at 18:19

## 2024-04-27 RX ADMIN — ESCITALOPRAM OXALATE 10 MG: 10 TABLET ORAL at 19:30

## 2024-04-27 RX ADMIN — ACETAMINOPHEN 650 MG: 325 TABLET, FILM COATED ORAL at 01:49

## 2024-04-27 RX ADMIN — HEPARIN SODIUM 750 UNITS/HR: 10000 INJECTION, SOLUTION INTRAVENOUS at 01:56

## 2024-04-27 RX ADMIN — KETOROLAC TROMETHAMINE 30 MG: 30 INJECTION, SOLUTION INTRAMUSCULAR; INTRAVENOUS at 17:00

## 2024-04-27 RX ADMIN — LIDOCAINE 1 PATCH: 4 PATCH TOPICAL at 19:31

## 2024-04-27 RX ADMIN — METHOCARBAMOL 500 MG: 500 TABLET ORAL at 13:20

## 2024-04-27 RX ADMIN — OXYCODONE HYDROCHLORIDE 10 MG: 5 TABLET ORAL at 23:58

## 2024-04-27 RX ADMIN — OXYCODONE HYDROCHLORIDE 5 MG: 5 TABLET ORAL at 18:23

## 2024-04-27 RX ADMIN — ACETAMINOPHEN 650 MG: 325 TABLET, FILM COATED ORAL at 19:30

## 2024-04-27 RX ADMIN — KETOROLAC TROMETHAMINE 30 MG: 30 INJECTION, SOLUTION INTRAMUSCULAR; INTRAVENOUS at 05:21

## 2024-04-27 RX ADMIN — RIVAROXABAN 15 MG: 15 TABLET, FILM COATED ORAL at 09:22

## 2024-04-27 RX ADMIN — ACETAMINOPHEN 650 MG: 325 TABLET, FILM COATED ORAL at 13:19

## 2024-04-27 RX ADMIN — METHOCARBAMOL 500 MG: 500 TABLET ORAL at 07:51

## 2024-04-27 RX ADMIN — METHOCARBAMOL 500 MG: 500 TABLET ORAL at 22:38

## 2024-04-27 RX ADMIN — KETOROLAC TROMETHAMINE 30 MG: 30 INJECTION, SOLUTION INTRAMUSCULAR; INTRAVENOUS at 11:40

## 2024-04-27 RX ADMIN — ACETAMINOPHEN 650 MG: 325 TABLET, FILM COATED ORAL at 07:50

## 2024-04-27 RX ADMIN — METHOCARBAMOL 500 MG: 500 TABLET ORAL at 18:19

## 2024-04-27 RX ADMIN — OXYCODONE HYDROCHLORIDE 5 MG: 5 TABLET ORAL at 07:50

## 2024-04-27 ASSESSMENT — ACTIVITIES OF DAILY LIVING (ADL)
ADLS_ACUITY_SCORE: 25
ADLS_ACUITY_SCORE: 23
ADLS_ACUITY_SCORE: 25

## 2024-04-27 NOTE — PROGRESS NOTES
"Pt to room 2218 from PACU at approx 1900.  Settled in bed.  A&OX4, VSS, c/o \"sore throat\" and left armpit pain. Left axilla dressing CDI.  BASILIO with bloody drainage. Report given to oncoming nurse.   "

## 2024-04-27 NOTE — PLAN OF CARE
Goal Outcome Evaluation:      Plan of Care Reviewed With: patient    Overall Patient Progress: improvingOverall Patient Progress: improving    Date & Time: 4/27 27 [id number]  Behavior & Aggression: green  Fall Risk: no  Orientation:A&OX4  VS/Oxygen: VSS on RA  Pain Management:pain controlled with prn pain meds  Bowel/Bladder: Positive BS, voiding well.   Drains: BASILIO with bloody drainage, stripped q 4 hours  Wounds/incisions:  Left flank dressing CDI  Diet:tolerating regular diet without nausea  Activity Level: up IND  Anticipated  DC Date: ?4/28

## 2024-04-27 NOTE — PROGRESS NOTES
Vascular Surgery Progress Note:  POD#1  Left TOS    S: Sore from surgical site but otherwise doing well.  Some numbness and tingling to the left forearm from traction of the lower brachial plexus.  No shortness of breath.    O: Alert and appropriate.  Sitting up in bed.  Fairly comfortable but sore with deep breathing as expected  Vitals:  BP  Min: 108/76  Max: 134/92  Temp  Av.9  F (36.6  C)  Min: 96.9  F (36.1  C)  Max: 98.9  F (37.2  C)  Pulse  Av.3  Min: 70  Max: 95  I/O last 3 completed shifts:  In: 1932 [P.O.:240; I.V.:1692]  Out: 675 [Urine:650; Drains:15; Blood:10]    Physical Exam: Chest= clear to auscultation    Left axillary incision=A    Minimal BASILIO output (15 mL/shift)    +3 left radial pulse.  Minimal swelling.    Restarted IV heparin earlier this morning.      Assessment/Plan: #1.  Doing very well following left TOS surgery with significant mobilization of the left subclavian vein.  Sore as expected and will continue to treat this with medications.  Will leave BASILIO drain in until morning at which time she will likely be discharged home.     #2.  With minimal BASILIO output will initiate Xarelto 15 mg twice daily and discontinue IV heparin.     #3.  Encourage use of incentive spirometer, up in chair, ambulation.    Plan discharged to home tomorrow on Xarelto and appropriate pain medications.  Will have follow-up venogram scheduled as an outpatient with interventional radiology in 10 to 14 days.  Discussed plan with patient.      Wm. Henry MD

## 2024-04-27 NOTE — PROGRESS NOTES
[facility]    Medicine Progress Note - Hospitalist Service    Date of Admission:  4/25/2024    Assessment & Plan   Rakel Shaw is a 33 year old female admitted on 4/25/2024. She presents as a direct admission from [facility] emergency department after she was found to have a left subclavian and distal IJ thrombus in the setting of several months of left shoulder and arm discomfort with minimally elevated CK being followed by her primary care team.     Left upper extremity medial subclavian, distal left IJ DVT:  Possible thoracic outlet syndrome patient is status post left transaxillary first rib resection and scalenectomy, mobilization of the left subclavian vein by vascular surgery on 4/27/2024   Approximately 2 months of pain, occasional numbness down her left arm.  Unaware of any trauma.  Does have a more distant history of rhabdomyolysis after [employer] (2019).  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  Recently had hepatitis vaccinations, but symptoms of pain preceded this by months.  No family history of clotting.    Started on IV heparin drip for anticoagulation on admission.  Switch to oral Xarelto 15 mg twice daily for vascular surgery on 4/27/2024    IR consultation requested.  As per IR Given that thrombus is non-occlusive and minimally symptomatic, would not plan for endovascular IR procedure now. Recommendation per discussion with surgical teams is for first rib resection in the short-term, then venogram (outpatient) with IR in 10-14 days post-op       Patient is on hormonal birth control which might have contributed to the DVT which will be discontinued going forward  Discussed with patient about using alternative birth control    Patient underwent above-noted surgery on  "4/26/2024    Postop management per vascular surgery.    PCOS: Not on medications for this.  Reports regular menstrual cycles, was on metformin briefly but this caused GI upset so discontinued.     Postpartum depression:  -Continue prior to admission Lexapro     Microscopic hematuria: 8-10 red cells noted on urinalysis through outside system 4/25/2024, but also with some measurable red cells on an otherwise contaminated urine specimen from November 2023..  -Outpatient urology follow-up for consideration of cystoscopy.           Diet: Regular Diet Adult  Diet    DVT Prophylaxis: Heparin drip  Malagon Catheter: Not present  Lines: None     Cardiac Monitoring: None  Code Status: Full Code      Clinically Significant Risk Factors                         # Overweight: Estimated body mass index is 29.87 kg/m  as calculated from the following:    Height as of this encounter: 1.702 m (5' 7\").    Weight as of this encounter: 86.5 kg (190 lb 11.2 oz)., PRESENT ON ADMISSION            Disposition Plan     Medically Ready for Discharge: Anticipated Tomorrow       Discussed with bedside RN,  patient       Deann Arriola MD  Hospitalist Service  [facility]  Securely message with [employer] (more info)  Text page via [facility] Paging/Directory   ______________________________________________________________________    Interval History   Patient is resting comfortably in bed.  Getting pain meds to help with the postsurgical pain.  Switched IV heparin drip to oral Xarelto as per vascular surgery.  No other acute issues    Physical Exam   Vital Signs: Temp: 98.6  F (37  C) Temp src: Oral BP: (!) 134/92 Pulse: 91   Resp: 15 SpO2: 95 % O2 Device: None (Room air) Oxygen Delivery: 1 LPM  Weight: 190 lbs 11.17 oz    General Appearance: Alert,  awake, not in acute distress  Respiratory: Clear to auscultation  Cardiovascular: Normal rate rhythm regular, no murmur  GI: Soft, nontender nondistended bowel sounds positive  Skin: " No clubbing cyanosis or edema  Other:      Medical Decision Making       49 MINUTES SPENT BY ME on the date of service doing chart review, history, exam, documentation & further activities per the note.      Data         Imaging results reviewed over the past 24 hrs:   Recent Results (from the past 24 hour(s))   XR Chest Port 1 View    Narrative    EXAM: XR CHEST PORT 1 VIEW  LOCATION: [facility]  DATE: 4/26/2024    INDICATION: Rib Resection  COMPARISON: None      Impression    IMPRESSION: Very shallow inspiration with probable very mild atelectasis in the left lower lobe. Postop change over the left upper chest with surgical drain. Resection of the left first rib. No pneumothorax.

## 2024-04-27 NOTE — PLAN OF CARE
"Goal Outcome Evaluation:      Plan of Care Reviewed With: patient    Overall Patient Progress: improvingOverall Patient Progress: improving       Surgery: POD #1 Excision, rib, first, transaxillary approach   Behavior & Aggression: Green  Fall Risk: Yes  Orientation: A&Ox4  ABNL VS/O2: VSS on RA  Pain Management: Scheduled Tylenol, Toradol, Lidocaine patch, & heat packs.  Bowel/Bladder: Adequate voiding. BS audible- passing gas.  Drains: BASIILO drain to L flank area- dressing CDI with bright bloody output.   IV: PIV infusing LR at 75 mL/hr. 2nd PIV infusing Heparin at 750 units/hr- will discontinue in AM & begin oral anticoagulant then   Wounds/incisions: L flank dressing & BASILIO drain site- CDI.   Diet: Regular- denies N/V. Tolerated clears overnight  Activity Level: SBA with IV pole- ambulated in halls x1  Anticipated  DC Date: Pending  Significant Information: Pt reported L arm tingling saying it \"feels asleep\"- MD aware. Pulses present with palpation.  "

## 2024-04-28 VITALS
DIASTOLIC BLOOD PRESSURE: 89 MMHG | OXYGEN SATURATION: 95 % | HEART RATE: 101 BPM | BODY MASS INDEX: 29.93 KG/M2 | SYSTOLIC BLOOD PRESSURE: 126 MMHG | RESPIRATION RATE: 20 BRPM | WEIGHT: 190.7 LBS | TEMPERATURE: 99.1 F | HEIGHT: 67 IN

## 2024-04-28 LAB — GLUCOSE BLDC GLUCOMTR-MCNC: 100 MG/DL (ref 70–99)

## 2024-04-28 PROCEDURE — 250N000013 HC RX MED GY IP 250 OP 250 PS 637: Performed by: STUDENT IN AN ORGANIZED HEALTH CARE EDUCATION/TRAINING PROGRAM

## 2024-04-28 PROCEDURE — 250N000013 HC RX MED GY IP 250 OP 250 PS 637: Performed by: SURGERY

## 2024-04-28 PROCEDURE — 99231 SBSQ HOSP IP/OBS SF/LOW 25: CPT | Performed by: INTERNAL MEDICINE

## 2024-04-28 RX ADMIN — OXYCODONE HYDROCHLORIDE 5 MG: 5 TABLET ORAL at 15:38

## 2024-04-28 RX ADMIN — METHOCARBAMOL 500 MG: 500 TABLET ORAL at 13:39

## 2024-04-28 RX ADMIN — RIVAROXABAN 15 MG: 15 TABLET, FILM COATED ORAL at 08:28

## 2024-04-28 RX ADMIN — OXYCODONE HYDROCHLORIDE 5 MG: 5 TABLET ORAL at 09:18

## 2024-04-28 RX ADMIN — ACETAMINOPHEN 650 MG: 325 TABLET, FILM COATED ORAL at 08:28

## 2024-04-28 RX ADMIN — ACETAMINOPHEN 650 MG: 325 TABLET, FILM COATED ORAL at 13:39

## 2024-04-28 RX ADMIN — ACETAMINOPHEN 650 MG: 325 TABLET, FILM COATED ORAL at 02:44

## 2024-04-28 RX ADMIN — METHOCARBAMOL 500 MG: 500 TABLET ORAL at 08:28

## 2024-04-28 ASSESSMENT — ACTIVITIES OF DAILY LIVING (ADL)
ADLS_ACUITY_SCORE: 25

## 2024-04-28 NOTE — PROGRESS NOTES
[facility]    Medicine Progress Note - Hospitalist Service    Date of Admission:  4/25/2024    Assessment & Plan   Rakel Shaw is a 33 year old female admitted on 4/25/2024. She presents as a direct admission from [facility] emergency department after she was found to have a left subclavian and distal IJ thrombus in the setting of several months of left shoulder and arm discomfort with minimally elevated CK being followed by her primary care team.     Left upper extremity medial subclavian, distal left IJ DVT:  Possible thoracic outlet syndrome patient is status post left transaxillary first rib resection and scalenectomy, mobilization of the left subclavian vein by vascular surgery on 4/27/2024   Approximately 2 months of pain, occasional numbness down her left arm.  Unaware of any trauma.  Does have a more distant history of rhabdomyolysis after [employer] (2019).  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  Recently had hepatitis vaccinations, but symptoms of pain preceded this by months.  No family history of clotting.    Started on IV heparin drip for anticoagulation on admission.  Switch to oral Xarelto 15 mg twice daily for vascular surgery on 4/27/2024    IR consultation requested.  As per IR Given that thrombus is non-occlusive and minimally symptomatic, would not plan for endovascular IR procedure now. Recommendation per discussion with surgical teams is for first rib resection in the short-term, then venogram (outpatient) with IR in 10-14 days post-op       Patient is on hormonal birth control which might have contributed to the DVT which will be discontinued going forward  Discussed with patient about using alternative birth control    Patient underwent above-noted surgery on  "4/26/2024    Postop management per vascular surgery.     Discharged to home today as per vascular surgery    PCOS: Not on medications for this.  Reports regular menstrual cycles, was on metformin briefly but this caused GI upset so discontinued.     Postpartum depression:  -Continue prior to admission Lexapro     Microscopic hematuria: 8-10 red cells noted on urinalysis through outside system 4/25/2024, but also with some measurable red cells on an otherwise contaminated urine specimen from November 2023..  -Outpatient urology follow-up for consideration of cystoscopy.           Diet: Regular Diet Adult  Diet    DVT Prophylaxis: Heparin drip now on Xarelto  Malagon Catheter: Not present  Lines: None     Cardiac Monitoring: None  Code Status: Full Code      Clinically Significant Risk Factors                         # Overweight: Estimated body mass index is 29.87 kg/m  as calculated from the following:    Height as of this encounter: 1.702 m (5' 7\").    Weight as of this encounter: 86.5 kg (190 lb 11.2 oz)., PRESENT ON ADMISSION            Disposition Plan     Medically Ready for Discharge: Home today in stable condition with family     Discussed with bedside RN,  patient       Deann Arriola MD  Hospitalist Service  [facility]  Securely message with [employer] (more info)  Text page via [employer] Paging/Directory   ______________________________________________________________________    Interval History   Patient is resting comfortably in bed.  Getting pain meds to help with the postsurgical pain.  .  No other acute issues    Physical Exam   Vital Signs: Temp: 99.1  F (37.3  C) Temp src: Oral BP: 126/89 Pulse: 101   Resp: 20 SpO2: 95 % O2 Device: None (Room air)    Weight: 190 lbs 11.17 oz    General Appearance: Alert,  awake, not in acute distress  Respiratory: Clear to auscultation  Cardiovascular: Normal rate rhythm regular, no murmur  GI: Soft, nontender nondistended bowel sounds positive  Skin: " No clubbing cyanosis or edema  Other:      Medical Decision Making       49 MINUTES SPENT BY ME on the date of service doing chart review, history, exam, documentation & further activities per the note.      Data         Imaging results reviewed over the past 24 hrs:   No results found for this or any previous visit (from the past 24 hour(s)).

## 2024-04-28 NOTE — PLAN OF CARE
Goal Outcome Evaluation:      Plan of Care Reviewed With: patient    Overall Patient Progress: improvingOverall Patient Progress: improving       Surgery: POD #2 Excision, rib, first, transaxillary approach   Behavior & Aggression: Green  Fall Risk: Yes  Orientation: A&Ox4  ABNL VS/O2: VSS on RA  Pain Management: Scheduled Tylenol, Robaxin, Lidocaine patch, & PRN Oxycodone.  Bowel/Bladder: Adequate voiding. BS audible- passing gas.  Drains: BASILIO drain to L flank area with serosanguinous output & stripped per orders.   IV: PIV SL  Wounds/incisions: L flank dressing & BASILIO drain site- CDI.   Diet: Regular- reported feeling intermittently nauseous x1- relieved with sips of clears.   Activity Level: SBA   Anticipated  DC Date: Pending

## 2024-04-28 NOTE — DISCHARGE INSTRUCTIONS
Call Dr. Figueroa at [phone]   Fever over 101  Any sign of infection, including redness/warmth/purulent drainage from incision  Pain not controlled with pain meds  ANY questions/concerns

## 2024-04-28 NOTE — PROGRESS NOTES
Vascular Surgery Progress Note    S: Still sore at surgical site but much better and well-controlled medication.  Tingling and numbness along ulnar nerve distribution has almost resolved.    O: Alert and appropriate.  Very comfortable.  Conversant.  Vitals:  BP  Min: 117/85  Max: 143/85  Temp  Av.3  F (36.8  C)  Min: 97.2  F (36.2  C)  Max: 99.1  F (37.3  C)  Pulse  Av.3  Min: 101  Max: 108  I/O last 3 completed shifts:  In: 1790 [P.O.:1190; I.V.:600]  Out:  [Urine:2100; Drains:25]    Physical Exam: Chest= clear    Wd=A    Minimal BASILIO output----removed      Assessment/Plan: #1.  Doing well following left IJ/subclavian vein DVT likely due to vasogenic TOS.  Status post left transaxillary rib resection scalenectomy with extensive mobilization of subclavian vein.     #2.  Initiated Xarelto yesterday 15 mg twice daily.  This will be continued     #3.  Home today with family.  Incentive spirometer at home.  Oxycodone as needed along with Flexeril and Tylenol.  Will schedule outpatient left arm venogram in 10 to 14 days with interventional radiology.      Wm. Henry MD

## 2024-04-28 NOTE — PLAN OF CARE
Goal Outcome Evaluation:      Plan of Care Reviewed With: patient    Overall Patient Progress: improvingOverall Patient Progress: improving    Date & Time: 4/28/24 [id number]  Behavior & Aggression: green  Fall Risk: no  Orientation:A&OX4  ABNL VS/O2:VSS, RA  Pain Management:pain controlled with oxycodone, tylenol and robaxin  Bowel/Bladder: Voiding well, passing flatus  Wounds/incisions: Left flank/axilla dressing CDI  Diet:tolerating regular diet without any nausea  Activity Level: up IND, often requires assistance to sit up from lying position, then able to walk IND and return to bed IND  Anticipated  DC Date: today, 4/28, once  here to   Significant Information: Discharge instructions reviewed with patient, meds given paying particular attention to Xeralto instructions.  Pt ready to go, waiting for ride to come.   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

## 2024-04-28 NOTE — DISCHARGE SUMMARY
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  Subclavian vein was only partially thrombosed on preoperative studies and appeared to be patent.    POSTOPERATIVE COMPLICATIONS: None     DATE OF DISCHARGE: 2024    HOSPITAL COURSE: Rakel Shaw is a 33 year old female was admitted on 2024 complaining for approximately 2-week history of left arm soreness.  Found to have partially occlusive thrombus of the medial left subclavian vein and distal internal jugular vein.  Felt this was most likely due to vasogenic thoracic outlet syndrome.  Was started on heparin intravenously upon admission.  Went to the operating room on 2024 for the above-mentioned procedure.  Drains were left postoperatively.  Patient did well following the surgery was kept on intravenous heparin following the surgery.  This was transitioned to Xarelto 15 mg twice daily 2024.  She tolerated the procedure well and postoperatively was transferred to the general post-surgical unit.  The remainder of her course was essentiallly uncomplicated.  Drains were removed on 2024 and she was discharged to home with her family prior to discharge, her pain was controlled well with oxycodone, Tylenol, Flexeril    DISCHARGE INSTRUCTIONS:  Use incentive spirometer at home.  May shower.  Band-Aid over drain exit site.    FOLLOW UP APPOINTMENTS:   Will be scheduled for a left arm venogram with interventional radiology as an outpatient in 10 to 14 days at [facility].    DISCHARGE MEDICATIONS:     Review of your medicines        START taking        Dose /  Directions   acetaminophen 325 MG tablet  Commonly known as: TYLENOL  Used for: TOS (thoracic outlet syndrome), Subclavian vein thrombosis, left (H)      Dose: 650 mg  Take 2 tablets (650 mg) by mouth every 4 hours as needed for mild pain  Refills: 0     methocarbamol 500 MG tablet  Commonly known as: ROBAXIN  Used for: TOS (thoracic outlet syndrome), Subclavian vein thrombosis, left (H)      Dose: 500 mg  Take 1 tablet (500 mg) by mouth 4 times daily  Quantity: 20 tablet  Refills: 0     oxyCODONE 5 MG tablet  Commonly known as: ROXICODONE  Used for: TOS (thoracic outlet syndrome), Subclavian vein thrombosis, left (H)      Dose: 5 mg  Take 1 tablet (5 mg) by mouth every 4 hours as needed for moderate pain  Quantity: 20 tablet  Refills: 0     rivaroxaban ANTICOAGULANT 2.5 MG Tabs tablet  Commonly known as: XARELTO  Indication: DVT-PE Treatment  Used for: TOS (thoracic outlet syndrome), Subclavian vein thrombosis, left (H)      Start taking on: April 27, 2024  Take 6 tablets (15 mg) by mouth 2 times daily (with meals) for 20 days, THEN 8 tablets (20 mg) daily (with dinner) for 40 days.  Quantity: 40 tablet  Refills: 0     senna-docusate 8.6-50 MG tablet  Commonly known as: SENOKOT-S/PERICOLACE  Used for: TOS (thoracic outlet syndrome), Subclavian vein thrombosis, left (H)      Dose: 1 tablet  Take 1 tablet by mouth 2 times daily as needed for constipation  Quantity: 40 tablet  Refills: 0            CONTINUE these medicines which have NOT CHANGED        Dose / Directions   drospirenone-ethinyl estradiol 3-0.02 MG tablet  Commonly known as: RADHA      Dose: 1 tablet  Take 1 tablet by mouth at bedtime  Refills: 0     escitalopram 10 MG tablet  Commonly known as: LEXAPRO      Dose: 10 mg  Take 10 mg by mouth at bedtime  Refills: 0               Where to get your medicines        These medications were sent to [facility] [place], [place] - [address] [address]  [address] [address], [place] [address]       Phone: [phone]   methocarbamol 500 MG tablet  oxyCODONE 5 MG tablet  rivaroxaban ANTICOAGULANT 2.5 MG Tabs tablet  senna-docusate 8.6-50 MG tablet       Some of these will need a paper prescription and others can be bought over the counter. Ask your nurse if you have questions.    You don't need a prescription for these medications  acetaminophen 325 MG tablet         Musa Figueroa MD

## 2024-04-29 ENCOUNTER — TELEPHONE (OUTPATIENT)
Dept: OTHER | Facility: CLINIC | Age: 34
End: 2024-04-29
Payer: COMMERCIAL

## 2024-04-29 NOTE — TELEPHONE ENCOUNTER
[facility]    Who is the name of the provider?:  SHIRLEY ELIAS   What is the location you see this provider at/preferred location?: [place]  Person calling / Facility: Rakel Shaw  Phone number:  [phone] (home)   Nurse call back needed:  YES     Reason for call:  Patient called and is requesting a letter for advising her employer when she should be able to return to work. Please call her.    Pharmacy location:  [id number] [place] [address] AT [place] [address] & [facility]  Outside Imaging: n/a   Can we leave a detailed message on this number?  YES     4/29/2024, 9:54 AM

## 2024-04-30 ENCOUNTER — TELEPHONE (OUTPATIENT)
Dept: OTHER | Facility: CLINIC | Age: 34
End: 2024-04-30
Payer: COMMERCIAL

## 2024-04-30 DIAGNOSIS — I82.890 JUGULAR VEIN THROMBOSIS: Primary | ICD-10-CM

## 2024-04-30 NOTE — TELEPHONE ENCOUNTER
Reason for Call:  Other LETTER FOR MISSING OUT WORK FOR [employer]     Detailed comments: SPOUSE WAS WITH PATIENT DURING SURGERY AND RECOVERY 4/26-4/30/24    SPOUSE NEEDS A LETTER TO SHOW THAT HE MISSED WORK TO CARE FOR PATIENT    Phone Number Patient can be reached at: Other phone number:  [phone]    NO CONSENT TO COMMUNICATE ON FILE FOR SPOUSE    Best Time: ASAP    Can we leave a detailed message on this number? YES    Call taken on 4/30/2024 at 8:13 AM by Geraldine Fernandes

## 2024-04-30 NOTE — LETTER
May 14, 2024      Rakel Shaw  [address] [address] [address]        To Whom It May Concern:  o request a note to work for half days instead of 8. I m still very sore and Tylenol is not very helpful. I slipped off the stairs yesterday but I m ok and did not thankfully hit my head but I pulled on the rail and I m too sore. I need the note since I m a county  and need it for any accommodations. I m open to advice as well. Let me know if there is anything I can take for the soreness as well even if it s over the counter.     Rakel Shaw was seen in our clinic and recently underwent surgery and numerous procedures to her left arm. Given the extent of pain associated, would request that she starts at half days before gradually going to 8 hours a day. Half days should be for two weeks once Rakel starts, however will be dictated by her comfort level.  She may return to work with the following: limited to 4-5 hour workday on or about for 2 weeks. Once at work, no repeated overhead lifting with the left arm.       Sincerely,      Ann-Marie Hadley, Vascular Surgery Nurse Practitioner

## 2024-04-30 NOTE — TELEPHONE ENCOUNTER
Contacted patient to schedule left upper extremity venogram.  Left VM with contact number to schedule  Sweta Del Toro RN  IR nurse clinician  [phone]

## 2024-04-30 NOTE — LETTER
[facility]  [address]  [place] [address]  Phone: [phone]  Fax: [phone]      May 1, 2024      Rakel Shaw  [address] [address] [address]          To Whom It May Concern,    Rakel Shaw has recently been under my professional medical care.  Please excuse her from work starting on April 25th, 2024 through May 10th, 2024.  Rakel may return to work on Monday, May 13th, 2024 without restrictions.  Please note, these dates may need to be adjusted pending recovery.    Sincerely,      Musa Figueroa MD    [facility] [place] [id number]  [place] [address]  Phone: [phone]  Fax: [phone]

## 2024-04-30 NOTE — TELEPHONE ENCOUNTER
[place] [facility]    I called Rakel Shaw and spoke with her partner Medina Cazaresron.  She is sore as expected but doing well and pain is well-controlled.  No shortness of breath or other issues.      I spoke with Dr. Davis of interventional radiology and reviewed her images.  He will have her come in this coming week for a left arm venogram as an outpatient.  Patient will continue on the Xarelto.      Nicholas requested that we give him a note saying that he was in the hospital with his partner from the time she was admitted last week until discharge and did miss work due to this and taking care of her postoperatively.       Musa Figueroa MD

## 2024-04-30 NOTE — TELEPHONE ENCOUNTER
Patient returned phone call.  Scheduled for 5/6 left upper extremity venogram with Dr. Davis.  Check in at 9:30 am at [facility].  Instructions given to patient.  Verbalizes understanding.  Sweta Del Toro RN  IR nurse clinician  [phone]

## 2024-04-30 NOTE — TELEPHONE ENCOUNTER
Patient called stating she had not hear from anyone. I relayed Dr Figueroa's message from his conversation with her .     She is requesting a letter to excuse her from work from the time of surgery on Friday 4/26/24 through 1 week or 2 weeks depending on pain. She stated many times that she would like to speak with a nurse.    [phone]

## 2024-05-01 NOTE — TELEPHONE ENCOUNTER
Letter sent via [employer].  Returned call to patient to ensure all needs have currently been met.  Patient stated she had received everything needed and had no further questions at this time.  Hillary Manzanares, BRUNILDAN, RN, CV-[facility]

## 2024-05-02 NOTE — TELEPHONE ENCOUNTER
See other phone encounter in [facility] where patient received everything she needed.    Discharged

## 2024-05-06 ENCOUNTER — APPOINTMENT (OUTPATIENT)
Dept: INTERVENTIONAL RADIOLOGY/VASCULAR | Facility: CLINIC | Age: 34
End: 2024-05-06
Attending: SURGERY
Payer: COMMERCIAL

## 2024-05-06 ENCOUNTER — HOSPITAL ENCOUNTER (OUTPATIENT)
Facility: CLINIC | Age: 34
Discharge: HOME | End: 2024-05-06
Admitting: RADIOLOGY
Payer: COMMERCIAL

## 2024-05-06 VITALS
WEIGHT: 193 LBS | DIASTOLIC BLOOD PRESSURE: 70 MMHG | OXYGEN SATURATION: 97 % | TEMPERATURE: 98.2 F | SYSTOLIC BLOOD PRESSURE: 122 MMHG | BODY MASS INDEX: 30.29 KG/M2 | HEIGHT: 67 IN | HEART RATE: 91 BPM | RESPIRATION RATE: 16 BRPM

## 2024-05-06 DIAGNOSIS — I82.890 JUGULAR VEIN THROMBOSIS: ICD-10-CM

## 2024-05-06 DIAGNOSIS — G54.0 TOS (THORACIC OUTLET SYNDROME): Primary | ICD-10-CM

## 2024-05-06 LAB
ANION GAP SERPL CALCULATED.3IONS-SCNC: 13 MMOL/L (ref 7–15)
APTT PPP: 40 SECONDS (ref 22–38)
BUN SERPL-MCNC: 9.7 MG/DL (ref 6–20)
CALCIUM SERPL-MCNC: 9.5 MG/DL (ref 8.6–10)
CHLORIDE SERPL-SCNC: 104 MMOL/L (ref 98–107)
CREAT SERPL-MCNC: 0.66 MG/DL (ref 0.51–0.95)
DEPRECATED HCO3 PLAS-SCNC: 22 MMOL/L (ref 22–29)
EGFRCR SERPLBLD CKD-EPI 2021: >90 ML/MIN/1.73M2
ERYTHROCYTE [DISTWIDTH] IN BLOOD BY AUTOMATED COUNT: 12.8 % (ref 10–15)
GLUCOSE SERPL-MCNC: 96 MG/DL (ref 70–99)
HCT VFR BLD AUTO: 39.4 % (ref 35–47)
HGB BLD-MCNC: 12.9 G/DL (ref 11.7–15.7)
INR PPP: 1.31 (ref 0.85–1.15)
MCH RBC QN AUTO: 25.4 PG (ref 26.5–33)
MCHC RBC AUTO-ENTMCNC: 32.7 G/DL (ref 31.5–36.5)
MCV RBC AUTO: 78 FL (ref 78–100)
PLATELET # BLD AUTO: 547 10E3/UL (ref 150–450)
POTASSIUM SERPL-SCNC: 4 MMOL/L (ref 3.4–5.3)
RBC # BLD AUTO: 5.07 10E6/UL (ref 3.8–5.2)
SODIUM SERPL-SCNC: 139 MMOL/L (ref 135–145)
WBC # BLD AUTO: 11.6 10E3/UL (ref 4–11)

## 2024-05-06 PROCEDURE — C1769 GUIDE WIRE: HCPCS

## 2024-05-06 PROCEDURE — 272N000196 HC ACCESSORY CR5

## 2024-05-06 PROCEDURE — 258N000003 HC RX IP 258 OP 636: Performed by: RADIOLOGY

## 2024-05-06 PROCEDURE — 85027 COMPLETE CBC AUTOMATED: CPT | Performed by: RADIOLOGY

## 2024-05-06 PROCEDURE — 85730 THROMBOPLASTIN TIME PARTIAL: CPT | Performed by: RADIOLOGY

## 2024-05-06 PROCEDURE — 250N000011 HC RX IP 250 OP 636: Performed by: RADIOLOGY

## 2024-05-06 PROCEDURE — 272N000566 HC SHEATH CR3

## 2024-05-06 PROCEDURE — 999N000128 HC STATISTIC PERIPHERAL IV START W/O US GUIDANCE

## 2024-05-06 PROCEDURE — C1725 CATH, TRANSLUMIN NON-LASER: HCPCS

## 2024-05-06 PROCEDURE — 272N000116 HC CATH CR1

## 2024-05-06 PROCEDURE — 99152 MOD SED SAME PHYS/QHP 5/>YRS: CPT

## 2024-05-06 PROCEDURE — 85610 PROTHROMBIN TIME: CPT | Performed by: RADIOLOGY

## 2024-05-06 PROCEDURE — 99024 POSTOP FOLLOW-UP VISIT: CPT | Performed by: SURGERY

## 2024-05-06 PROCEDURE — 999N000012 HC STATISTIC ANGIOGRAM, STENT, VERTEBRO PLASTY

## 2024-05-06 PROCEDURE — 250N000009 HC RX 250: Performed by: PHYSICIAN ASSISTANT

## 2024-05-06 PROCEDURE — 272N000302 HC DEVICE INFLATION CR5

## 2024-05-06 PROCEDURE — 250N000011 HC RX IP 250 OP 636: Performed by: PHYSICIAN ASSISTANT

## 2024-05-06 PROCEDURE — 82374 ASSAY BLOOD CARBON DIOXIDE: CPT | Performed by: RADIOLOGY

## 2024-05-06 PROCEDURE — 255N000002 HC RX 255 OP 636: Performed by: RADIOLOGY

## 2024-05-06 PROCEDURE — 36415 COLL VENOUS BLD VENIPUNCTURE: CPT | Performed by: RADIOLOGY

## 2024-05-06 RX ORDER — NALOXONE HYDROCHLORIDE 0.4 MG/ML
0.2 INJECTION, SOLUTION INTRAMUSCULAR; INTRAVENOUS; SUBCUTANEOUS
Status: DISCONTINUED | OUTPATIENT
Start: 2024-05-06 | End: 2024-05-06 | Stop reason: HOSPADM

## 2024-05-06 RX ORDER — NALOXONE HYDROCHLORIDE 0.4 MG/ML
0.4 INJECTION, SOLUTION INTRAMUSCULAR; INTRAVENOUS; SUBCUTANEOUS
Status: DISCONTINUED | OUTPATIENT
Start: 2024-05-06 | End: 2024-05-06 | Stop reason: HOSPADM

## 2024-05-06 RX ORDER — FLUMAZENIL 0.1 MG/ML
0.2 INJECTION, SOLUTION INTRAVENOUS
Status: DISCONTINUED | OUTPATIENT
Start: 2024-05-06 | End: 2024-05-06 | Stop reason: HOSPADM

## 2024-05-06 RX ORDER — IOPAMIDOL 612 MG/ML
100 INJECTION, SOLUTION INTRAVASCULAR ONCE
Status: COMPLETED | OUTPATIENT
Start: 2024-05-06 | End: 2024-05-06

## 2024-05-06 RX ORDER — SODIUM CHLORIDE 9 MG/ML
INJECTION, SOLUTION INTRAVENOUS CONTINUOUS
Status: DISCONTINUED | OUTPATIENT
Start: 2024-05-06 | End: 2024-05-06 | Stop reason: HOSPADM

## 2024-05-06 RX ORDER — LIDOCAINE 40 MG/G
CREAM TOPICAL
Status: DISCONTINUED | OUTPATIENT
Start: 2024-05-06 | End: 2024-05-06 | Stop reason: HOSPADM

## 2024-05-06 RX ORDER — HEPARIN SODIUM 200 [USP'U]/100ML
1 INJECTION, SOLUTION INTRAVENOUS EVERY 5 MIN PRN
Status: DISCONTINUED | OUTPATIENT
Start: 2024-05-06 | End: 2024-05-06 | Stop reason: HOSPADM

## 2024-05-06 RX ORDER — FENTANYL CITRATE 50 UG/ML
25-50 INJECTION, SOLUTION INTRAMUSCULAR; INTRAVENOUS EVERY 5 MIN PRN
Status: DISCONTINUED | OUTPATIENT
Start: 2024-05-06 | End: 2024-05-06 | Stop reason: HOSPADM

## 2024-05-06 RX ADMIN — FENTANYL CITRATE 50 MCG: 50 INJECTION, SOLUTION INTRAMUSCULAR; INTRAVENOUS at 13:39

## 2024-05-06 RX ADMIN — IOPAMIDOL 55 ML: 612 INJECTION, SOLUTION INTRAVENOUS at 13:52

## 2024-05-06 RX ADMIN — FENTANYL CITRATE 50 MCG: 50 INJECTION, SOLUTION INTRAMUSCULAR; INTRAVENOUS at 13:27

## 2024-05-06 RX ADMIN — MIDAZOLAM 1 MG: 1 INJECTION INTRAMUSCULAR; INTRAVENOUS at 13:19

## 2024-05-06 RX ADMIN — SODIUM CHLORIDE: 9 INJECTION, SOLUTION INTRAVENOUS at 12:35

## 2024-05-06 RX ADMIN — FENTANYL CITRATE 50 MCG: 50 INJECTION, SOLUTION INTRAMUSCULAR; INTRAVENOUS at 13:44

## 2024-05-06 RX ADMIN — MIDAZOLAM 1 MG: 1 INJECTION INTRAMUSCULAR; INTRAVENOUS at 13:29

## 2024-05-06 RX ADMIN — HEPARIN SODIUM 3 BAG: 200 INJECTION, SOLUTION INTRAVENOUS at 13:32

## 2024-05-06 RX ADMIN — FENTANYL CITRATE 50 MCG: 50 INJECTION, SOLUTION INTRAMUSCULAR; INTRAVENOUS at 13:32

## 2024-05-06 RX ADMIN — LIDOCAINE HYDROCHLORIDE 3 ML: 10 INJECTION, SOLUTION INFILTRATION; PERINEURAL at 13:32

## 2024-05-06 RX ADMIN — MIDAZOLAM 1 MG: 1 INJECTION INTRAMUSCULAR; INTRAVENOUS at 13:38

## 2024-05-06 RX ADMIN — MIDAZOLAM 1 MG: 1 INJECTION INTRAMUSCULAR; INTRAVENOUS at 13:43

## 2024-05-06 ASSESSMENT — ACTIVITIES OF DAILY LIVING (ADL)
ADLS_ACUITY_SCORE: 38

## 2024-05-06 NOTE — IR NOTE
Interventional Radiology Intra-procedural Nursing Note    Patient Name: Rakel Shaw  Medical Record Number: [id number]  Today's Date: May 6, 2024    Procedure:  Left upper extremity venogram with moderate sedation with possible intervention     Start time: 1327  End time: 1345  Report provided to: JM  RN      Note: Patient entered Interventional Radiology Suite number 2 via cart. Patient awake, alert and oriented. Assisted onto procedural table in Supine position. Prepped and draped.  Dr. Davis in room. Time out and procedure started. Vital signs stable. Telemetry reading NSR.    Procedure well tolerated by patient without complications. Procedure end with debrief by Dr. Davis.  Pressure held until hemostasis achieved. Gauze dressing, Tagaderm applied to Left venous acces site.    Administered medication totals:  Lidocaine 1% 3 mL Intradermal    Versed 4 mg IVP  Fentanyl 200 mcg IVP    Last dose of sedation administered at 1343.

## 2024-05-06 NOTE — PROGRESS NOTES
Care Suites Admission Nursing Note    Patient Information  Name: Rakel Shaw  Age: 33 year old  Reason for admission: Left upper extremity venogram  Care Suites arrival time: 1010    Visitor Information  Name:  and sister     Patient Admission/Assessment   Pre-procedure assessment complete: Yes  If abnormal assessment/labs, provider notified: N/A  NPO: Yes  Medications held per instructions/orders: N/A  Consent: obtained  If applicable, pregnancy test status: Pt refused, states no chance of being pregnant  Patient oriented to room: Yes  Education/questions answered: Yes  Plan/other: proceed as planned    Discharge Planning  Discharge name/phone number: [facility] [phone]  Overnight post sedation caregiver: same  Discharge location: home    Martha Nunez RN

## 2024-05-06 NOTE — PROGRESS NOTES
Care Suites Post Procedure Note    Patient Information  Name: Rakel Shaw  Age: 33 year old    Post Procedure  Time patient returned to Care Suites: 1405  Concerns/abnormal assessment: none  If abnormal assessment, provider notified: N/A  Plan/Other: Recovery from sedation, close monitoring of VS and L brachial puncture site as ordered, discharge education, bedrest x 1 hours, home around 1505 if stable.      Martha Nunez RN

## 2024-05-06 NOTE — PROGRESS NOTES
Care Suites Discharge Nursing Note    Patient Information  Name: Rakel Shaw  Age: 33 year old    Discharge Education:  Discharge instructions reviewed: Yes  Additional education/resources provided: none  Patient/patient representative verbalizes understanding: Yes  Patient discharging on new medications: No  Medication education completed: N/A    Discharge Plans:   Discharge location: home  Discharge ride contacted: Yes  Approximate discharge time: 1500    Discharge Criteria:  Discharge criteria met and vital signs stable: Yes    Patient Belongs:  Patient belongings returned to patient: Yes    Martha Nunez RN

## 2024-05-06 NOTE — PRE-PROCEDURE
GENERAL PRE-PROCEDURE:   Procedure:  Left upper extremity venogram with moderate sedation with possible intervention  Date/Time:  5/6/2024 10:48 AM    Written consent obtained?: Yes    Risks and benefits: Risks, benefits and alternatives were discussed    DC Plan: Appropriate discharge home plan in place for patients who are going home after procedure   Consent given by:  Patient  Patient states understanding of procedure being performed: Yes    Patient's understanding of procedure matches consent: Yes    Procedure consent matches procedure scheduled: Yes    Expected level of sedation:  Moderate  Appropriately NPO:  Yes  ASA Class:  1  Mallampati  :  Grade 3- soft palate visible, posterior pharyngeal wall not visible  Lungs:  Lungs clear with good breath sounds bilaterally  Heart:  Normal heart sounds and rate  History & Physical reviewed:  History and physical reviewed and no updates needed

## 2024-05-06 NOTE — DISCHARGE INSTRUCTIONS
Peripheral Angiogram Discharge Instructions - Brachial    After you go home:    Have an adult stay with you until tomorrow.  Drink extra fluids for 2 days.  You may resume your normal diet.  No smoking       For 24 hours - due to the sedation you received:  Relax and take it easy.  Do NOT make any important or legal decisions.  Do NOT drive or operate machines at home or at work.  Do NOT drink alcohol.    Care of Arm Puncture Site:    For the first 24 hrs - check the puncture site every 1-2 hours while awake.  Remove the bandaid after 24 hours. If there is minor oozing, apply another bandaid and remove it after 12 hours.  It is normal to have a small bruise or pea size lump at the site.  You may shower tomorrow. Do NOT take a bath, or use a hot tub or pool for at least 3 days. Do NOT scrub the site. Do not use lotion or powder near the puncture site.     Activity:    For 2 days, do not use your hand or arm to support your weight (such as rising from a chair)   For 2 days, do not lift more than 5 pounds or exercise your arm (such as tennis, golf or bowling).    Bleeding:    If you start bleeding from the site in your arm, sit down and press firmly on/above the site for 10 minutes.   Once bleeding stops, keep your arm straight for 2 hours.   Call the [facility] as soon as you can.       Call 911 right away if you have heavy bleeding or bleeding that does not stop.      Medicines:     Take your medications, including blood thinners, unless your provider tells you not to.    If you have stopped any medicines, check with your provider about when to restart them.               Follow Up Appointments:    Follow up with [facility] as directed.    Call the clinic if:    You have increased pain or a large or growing hard lump around the site.  The site is red, swollen, hot or tender.  Blood or fluid is draining from the site.  You have chills or a fever greater than 101 F (38 C).  Your arm feels numb,  cool or changes color.  You have hives, a rash or unusual itching.  Any questions or concerns.      If you have questions or your original symptoms do not improve, call:             [facility] @ [phone]    Or you may contact your provider via My Chart

## 2024-05-12 ENCOUNTER — MYC MEDICAL ADVICE (OUTPATIENT)
Dept: OTHER | Facility: CLINIC | Age: 34
End: 2024-05-12
Payer: COMMERCIAL

## 2024-05-13 NOTE — TELEPHONE ENCOUNTER
Routing to Ann-Marie to advise.  Please see patient's MyChart message.    BRUNILDA NunezN, RN, CV-[facility]

## 2024-05-14 DIAGNOSIS — F41.8 POSTPARTUM ANXIETY: Primary | ICD-10-CM

## 2024-05-14 RX ORDER — ESCITALOPRAM OXALATE 10 MG/1
10 TABLET ORAL AT BEDTIME
Qty: 30 TABLET | Refills: 0 | Status: SHIPPED | OUTPATIENT
Start: 2024-05-14

## 2024-05-14 NOTE — TELEPHONE ENCOUNTER
Requested Prescriptions   Pending Prescriptions Disp Refills    escitalopram (LEXAPRO) 10 MG tablet       Sig: Take 1 tablet (10 mg) by mouth at bedtime       SSRIs Protocol Passed - 5/14/2024  8:11 AM        Passed - Recent (12 mo) or future (30 days) visit within the authorizing provider's specialty     The patient must have completed an in-person or virtual visit within the past 12 months or has a future visit scheduled within the next 90 days with the authorizing provider s specialty.  Urgent care and e-visits do not quality as an office visit for this protocol.          Passed - Medication is active on med list        Passed - Patient is age 18 or older        Passed - No active pregnancy on record        Passed - No positive pregnancy test in last 12 months           Last Written Prescription Date:  4/25/24  Last Fill Quantity: N/A,  # refills: N/A   Last office visit: 6/29/2023 ; last virtual visit: Visit date not found with prescribing provider:  Unknown, provided in the  Emergency Dept   Future Office Visit:      One month sent  Appointment needed for further refills  Cynthia Ospina RN on 5/14/2024 at 9:54 AM

## 2024-05-14 NOTE — TELEPHONE ENCOUNTER
Return to work letter with restrictions sent via [employer].     For the pain: increasing Tylenol to max dose 1000 mg TID, icy hot patches, stretching, and ice or heat to the area depending on what provides more relief. Avoid NSAIDs. Responded via [employer].     Ann-Marie Hadley

## 2024-05-24 ENCOUNTER — TELEPHONE (OUTPATIENT)
Dept: OTHER | Facility: CLINIC | Age: 34
End: 2024-05-24
Payer: COMMERCIAL

## 2024-05-24 DIAGNOSIS — I82.890 JUGULAR VEIN THROMBOSIS: Primary | ICD-10-CM

## 2024-05-24 NOTE — TELEPHONE ENCOUNTER
Per 5/6/24 note by Dr. Figueroa, pt needs the following:    Left upper extremity venous US  In clinic visit with Dr. Figueroa  Please schedule this approximately 6-8 weeks from 5/6/24    Routing to scheduling to contact patient to coordinate above.    Appt note in order comments.    BRUNILDA NunezN, RN, [facility] [place]

## 2024-05-28 NOTE — TELEPHONE ENCOUNTER
BRANDON for patient to call us back to schedule:    Left upper extremity venous US  In clinic visit with Dr. Figueroa  Please schedule this approximately 6-8 weeks from 5/6/24

## 2024-05-30 NOTE — TELEPHONE ENCOUNTER
Call 2 of 2. Left voicemail with instructions for patient to call back to schedule their appointment(s)    May 30, 2024 , 1:40 PM

## 2024-06-10 ENCOUNTER — OFFICE VISIT (OUTPATIENT)
Dept: URGENT CARE | Facility: URGENT CARE | Age: 34
End: 2024-06-10
Payer: COMMERCIAL

## 2024-06-10 VITALS
DIASTOLIC BLOOD PRESSURE: 82 MMHG | OXYGEN SATURATION: 100 % | HEART RATE: 60 BPM | WEIGHT: 194 LBS | SYSTOLIC BLOOD PRESSURE: 119 MMHG | BODY MASS INDEX: 30.38 KG/M2 | TEMPERATURE: 99.4 F

## 2024-06-10 DIAGNOSIS — J06.9 UPPER RESPIRATORY TRACT INFECTION, UNSPECIFIED TYPE: ICD-10-CM

## 2024-06-10 DIAGNOSIS — R07.0 THROAT PAIN: Primary | ICD-10-CM

## 2024-06-10 DIAGNOSIS — R00.0 TACHYCARDIA: ICD-10-CM

## 2024-06-10 LAB
DEPRECATED S PYO AG THROAT QL EIA: NEGATIVE
GROUP A STREP BY PCR: NOT DETECTED

## 2024-06-10 PROCEDURE — 99213 OFFICE O/P EST LOW 20 MIN: CPT | Performed by: FAMILY MEDICINE

## 2024-06-10 PROCEDURE — 93000 ELECTROCARDIOGRAM COMPLETE: CPT | Performed by: FAMILY MEDICINE

## 2024-06-10 PROCEDURE — 87651 STREP A DNA AMP PROBE: CPT | Performed by: FAMILY MEDICINE

## 2024-06-10 RX ORDER — BENZONATATE 100 MG/1
100 CAPSULE ORAL 3 TIMES DAILY PRN
Qty: 21 CAPSULE | Refills: 1 | Status: SHIPPED | OUTPATIENT
Start: 2024-06-10 | End: 2024-07-25

## 2024-06-10 NOTE — PROGRESS NOTES
Assessment & Plan     Throat pain  - Streptococcus A Rapid Screen w/Reflex to PCR - Clinic Collect  - Group A Streptococcus PCR Throat Swab    Tachycardia  - EKG 12-lead complete w/read - Clinics    Upper respiratory tract infection, unspecified type  - benzonatate (TESSALON) 100 MG capsule  Dispense: 21 capsule; Refill: 1     100% O2 saturation with clear lung exam, suspicion for pneumonia is low. Throat exam unremarkable.     EKG was done as patient appeared to have PVCs or possibly border tachy, this was interpreted by me as normal.     Symptomatic management for respiratory symptoms advised, tessalon provided.     See AVS summary for additional recommendations reviewed with patient during this visit.       Musa Guerrier MD   [place] [facility]    Hadley [place] is a 33 year old female who presents to clinic today for the following health issues:  Chief Complaint   Patient presents with    Urgent Care     Pt presents with cough with phlegm, runny nose, throat pain, fatigue X 1 week.     HPI    Patient notes that she has been sick for about 1 week to continues to have phlegm production intermittently she will have a bloody nose and her mucus from both her nostrils.  She has no difficulty with breathing at this time but feels like her cough is difficult to manage.  She has no underlying history of asthma.    Of note patient recently had a jugular vein thrombosis and underwent a partial rib removal to intervene on this.  She is maintained on Xarelto at this time.    Patient Active Problem List    Diagnosis Date Noted    Jugular vein thrombosis 04/26/2024     Priority: Medium    BMI 30.0-30.9,adult 10/17/2022     Priority: Medium    PCOS (polycystic ovarian syndrome) 02/08/2021     Priority: Medium    Breast lump on left side at 6 o'clock position 09/29/2020     Priority: Medium    Rhabdomyolysis 06/16/2019     Priority: Medium       Current Outpatient Medications   Medication Sig Dispense Refill     acetaminophen (TYLENOL) 325 MG tablet Take 2 tablets (650 mg) by mouth every 4 hours as needed for mild pain      benzonatate (TESSALON) 100 MG capsule Take 1 capsule (100 mg) by mouth 3 times daily as needed for cough 21 capsule 1    escitalopram (LEXAPRO) 10 MG tablet Take 1 tablet (10 mg) by mouth at bedtime 30 tablet 0    methocarbamol (ROBAXIN) 500 MG tablet Take 1 tablet (500 mg) by mouth 4 times daily 20 tablet 0    oxyCODONE (ROXICODONE) 5 MG tablet Take 1 tablet (5 mg) by mouth every 4 hours as needed for moderate pain 20 tablet 0    rivaroxaban ANTICOAGULANT (XARELTO) 2.5 MG TABS tablet Take 6 tablets (15 mg) by mouth 2 times daily (with meals) for 20 days, THEN 8 tablets (20 mg) daily (with dinner) for 40 days. 40 tablet 0    senna-docusate (SENOKOT-S/PERICOLACE) 8.6-50 MG tablet Take 1 tablet by mouth 2 times daily as needed for constipation 40 tablet 0     No current facility-administered medications for this visit.           Objective    /82   Pulse 60   Temp 99.4  F (37.4  C) (Tympanic)   Wt 88 kg (194 lb)   SpO2 100%   BMI 30.38 kg/m    Physical Exam         CV: HDS, border tachy  Pulm: clear bilaterally  GEN:  NAD, normal mentation  Nose: R anterior nasal vessels irritated but not actively bleeding  Throat: 2+ tonsils without exudate, no trismus    EKG: sinus, rate 90, no acute St-T abnormalities    Results for orders placed or performed in visit on 06/10/24   Streptococcus A Rapid Screen w/Reflex to PCR - Clinic Collect     Status: Normal    Specimen: Throat; Swab   Result Value Ref Range    Group A Strep antigen Negative Negative                     The use of Dragon/Checkpoint Surgical dictation services may have been used to construct the content in this note; any grammatical or spelling errors are non-intentional. Please contact the author of this note directly if you are in need of any clarification.

## 2024-06-10 NOTE — PATIENT INSTRUCTIONS
For cough (can take all of them together):   - Dextromethorphan 'DM' (over the counter - can be found in Robitussin/Delsym/generic cough meds)  - Honey: lozenges/cough drops or mix honey in warm water  - Tessalon/Benzonatate (prescription) every 8 hours as needed        If you develop worsening cough, new fever, shortness of breath please seek help right away      If no improvement in next 3 days return to be evaluated

## 2024-07-23 NOTE — PROGRESS NOTES
[place] [facility]    Rakel Shaw returns for follow-up.  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    -- 4/25/2024: Left transaxillary first rib resection scalenectomy with mobilization subclavian vein (no preoperative venogram or intervention due to chronicity and partial occlusion)    -- 5/6/2024 venogram with Dr. Davis: Widely patent left upper arm and axillary vein.  Difficult to determine whether there was residual thrombus in the medial subclavian and left internal jugular vein.  Internal jugular vein was accessed also with no obvious stenosis.  10 mm balloon venoplasty was performed with no significant narrowing and minimal waist on the balloon.  Continued on Xarelto for additional 3 months with follow-up in clinic.    PRESENT SITUATION:   Overall doing very well.  Intermittent soreness to the left shoulder region.  No weakness.  No numbness except some different sensation in the axillary region most likely due to the cutaneous nerve retraction that is gradually improving.  No swelling or other issues.  No problems at all with her dominant right arm.    She finished her Xarelto approximately 2 weeks ago.  Not taking other medications.    Exam: Alert and appropriate.  Normal affect.   Blood pressure 124/84 left arm.  Pulse 75   Chest= clear  Well-healed left axillary incision.  Normal sensation and motor function to arm and hand. Some mild tingling in the left axillary area    Left venous duplex today reveals a widely patent left subclavian axillary vein with no evidence of narrowing or thrombus.          IMPRESSION:   #1.  Excellent results following left vasogenic TOS surgery and procedures.  No evidence of recurrent stenosis.  She is finished off her Xarelto.  I recommend the children's aspirin for at least 3 months further and she will initiate this.    #2.  No symptoms in her dominant right arm but she could have a similar finding of  compression.    Will see her again in 3 months for a follow-up left subclavian vein venous duplex and also evaluate the right subclavian vein with maneuvers at that time.    20 minutes with patient today.  Follow-up in 3 months with duplex imaging    Musa Figueroa MD   This note was created using Dragon voice recognition software which may result in transcription errors.

## 2024-07-25 ENCOUNTER — OFFICE VISIT (OUTPATIENT)
Dept: OTHER | Facility: CLINIC | Age: 34
End: 2024-07-25
Attending: SURGERY
Payer: COMMERCIAL

## 2024-07-25 ENCOUNTER — HOSPITAL ENCOUNTER (OUTPATIENT)
Dept: ULTRASOUND IMAGING | Facility: CLINIC | Age: 34
Discharge: HOME | End: 2024-07-25
Attending: SURGERY
Payer: COMMERCIAL

## 2024-07-25 VITALS — HEART RATE: 75 BPM | SYSTOLIC BLOOD PRESSURE: 124 MMHG | DIASTOLIC BLOOD PRESSURE: 84 MMHG

## 2024-07-25 DIAGNOSIS — I82.890 JUGULAR VEIN THROMBOSIS: ICD-10-CM

## 2024-07-25 DIAGNOSIS — G54.0 THORACIC OUTLET SYNDROME: Primary | ICD-10-CM

## 2024-07-25 PROCEDURE — 93971 EXTREMITY STUDY: CPT | Mod: LT

## 2024-07-25 PROCEDURE — 99213 OFFICE O/P EST LOW 20 MIN: CPT | Mod: 25 | Performed by: SURGERY

## 2024-07-25 PROCEDURE — 99024 POSTOP FOLLOW-UP VISIT: CPT | Performed by: SURGERY

## 2024-07-25 NOTE — PROGRESS NOTES
[facility]        Patient is here for a  follow up.    Pt is currently taking no meds that would impact our treatment plan.    /84 (BP Location: Left arm, Patient Position: Chair, Cuff Size: Adult Regular)   Pulse 75     The provider has been notified that the patient has no concerns.     Questions patient would like addressed today are: N/A.    Refills are needed: N/A    Has homecare services and agency name:  Susana Deal MA

## 2024-08-01 NOTE — PHARMACY-ADMISSION MEDICATION HISTORY
Admission medication history interview status for this patient is complete. See [facility] admission navigator for allergy information, prior to admission medications and immunization status.     Medication history interview source(s):Patient  Medication history resources (including written lists, pill bottles, clinic record):\pill bottles from [facility]    Changes made to PTA medication list:  Added: spironolactone and minocycline  Deleted: magic mouth wash and polytrim eye drops  Changed: none    Actions taken by pharmacist (provider contacted, etc):None     Additional medication history information:verified spironolactone with [facility] @ [phone]    Medication reconciliation/reorder completed by provider prior to medication history? No    For patients on insulin therapy: no (Yes/No)   Lantus/levemir/NPH/Mix 70/30 dose: ___ in AM/PM or twice daily   Sliding scale Novolog Y/N   If Yes, do you have a baseline novolog pre-meal dose: ______units with meals   Patients eat three meals a day: Y/N ---  How many episodes of hypoglycemia (low blood glucose) do you have weekly: ---   How many missed doses do you have a week: ---  How many times do you check your blood glucose per day: ---  Any Barriers to therapy: cost of medications/comfortable with giving injections (if applicable)/ comfortable and confident with current diabetes regimen ---      Prior to Admission medications    Medication Sig Last Dose Taking? Auth Provider   minocycline (DYNACIN) 100 MG tablet Take 100 mg by mouth 2 times daily Past Week at Unknown time Yes Unknown, Entered By History   spironolactone (ALDACTONE) 50 MG tablet Take 50 mg by mouth daily Past Week at Unknown time Yes Unknown, Entered By History          Roomed by Alley Green CMA  8/1/2024  5:35 PM  Name and date of birth verified.     New to dermatology     HPI:  Oswaldo Jameson is a 34 year old male here for annual skin check .   PMHX/FHX: No personal history of skin cancer. Fhx of superficial skin cancer MOM and aunt .  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       All: ALLERGIES:  No Known Allergies    ROS:  feels well, no fevers, chills, nausea, vomiting, abdominal pain, chest pain,  sob, headache, and no joint pain. Reviewed 08/01/24  Patient denies any surgical procedures performed within the last 5 years. Reviewed 08/01/24    PE:  There were no vitals filed for this visit.  TBSE today except for   General: AOx3 wdwn in NAD, normal mood and affect  Scalp: no suspicious lesions  Face: no suspicious lesions  Ears: no suspicious lesions  Eyelids: no suspicious lesions  Lips: no suspicious lesions  Neck: no suspicious lesions  Chest: no suspicious lesions  Abd: no suspicious lesions  Back:  no suspicious lesions  Arms: no suspicious lesions  Legs/buttocks: no edema or varicosities, no suspicious lesions  Hands/feet: no suspicious lesions  Groin/Genital: exam deferred by pt  Several even colored symmetric brown macules to upper extremities and torso    A/P  Multiple nevi  -The patient is reassured regarding the benign appearance of the lesion(s). If any changes should occur in the future, he is encouraged to have the lesion(s) rechecked.  Skin cancer screening  -ABCDEs (asymmetry, border, color, diameter, evolving) of melanoma discussed with patient, skin cancer signs reviewed, regular monthly self skin exams recommended, sunprotection and regular use of daily sunscreen advised, annual full body exams by a physician  recommended.     Electronically signed by: Rahel Coy MD 08/01/24 5:35 PM  F/up 1 year or sooner prn

## 2024-09-03 ENCOUNTER — OFFICE VISIT (OUTPATIENT)
Dept: URGENT CARE | Facility: URGENT CARE | Age: 34
End: 2024-09-03
Payer: COMMERCIAL

## 2024-09-03 VITALS
DIASTOLIC BLOOD PRESSURE: 80 MMHG | RESPIRATION RATE: 16 BRPM | HEART RATE: 86 BPM | OXYGEN SATURATION: 100 % | BODY MASS INDEX: 29.44 KG/M2 | WEIGHT: 188 LBS | SYSTOLIC BLOOD PRESSURE: 125 MMHG | TEMPERATURE: 98.8 F

## 2024-09-03 DIAGNOSIS — B37.31 YEAST VAGINITIS: Primary | ICD-10-CM

## 2024-09-03 DIAGNOSIS — R30.0 DYSURIA: ICD-10-CM

## 2024-09-03 DIAGNOSIS — N89.8 VAGINAL DISCHARGE: ICD-10-CM

## 2024-09-03 LAB
ALBUMIN UR-MCNC: ABNORMAL MG/DL
APPEARANCE UR: CLEAR
BILIRUB UR QL STRIP: NEGATIVE
CLUE CELLS: ABNORMAL
COLOR UR AUTO: YELLOW
GLUCOSE UR STRIP-MCNC: NEGATIVE MG/DL
HGB UR QL STRIP: ABNORMAL
KETONES UR STRIP-MCNC: ABNORMAL MG/DL
LEUKOCYTE ESTERASE UR QL STRIP: NEGATIVE
NITRATE UR QL: NEGATIVE
PH UR STRIP: 6 [PH] (ref 5–7)
RBC #/AREA URNS AUTO: ABNORMAL /HPF
SP GR UR STRIP: >=1.03 (ref 1–1.03)
SQUAMOUS #/AREA URNS AUTO: ABNORMAL /LPF
TRICHOMONAS, WET PREP: ABNORMAL
UROBILINOGEN UR STRIP-ACNC: 1 E.U./DL
WBC #/AREA URNS AUTO: ABNORMAL /HPF
WBC'S/HIGH POWER FIELD, WET PREP: ABNORMAL
YEAST, WET PREP: PRESENT

## 2024-09-03 PROCEDURE — 99213 OFFICE O/P EST LOW 20 MIN: CPT | Performed by: PHYSICIAN ASSISTANT

## 2024-09-03 PROCEDURE — 81001 URINALYSIS AUTO W/SCOPE: CPT

## 2024-09-03 PROCEDURE — 87210 SMEAR WET MOUNT SALINE/INK: CPT

## 2024-09-03 RX ORDER — FLUCONAZOLE 150 MG/1
150 TABLET ORAL ONCE
Qty: 1 TABLET | Refills: 0 | Status: SHIPPED | OUTPATIENT
Start: 2024-09-03 | End: 2024-09-03

## 2024-09-03 NOTE — PROGRESS NOTES
(B37.31) Yeast vaginitis  (primary encounter diagnosis)  MDM: UA negative. Wet prep positive for yeast.   Plan: fluconazole (DIFLUCAN) 150 MG tablet          AVS/Plan:     September 3, 2024 Urgent Care Plan:       -Diflucan for vaginal yeast infection   -Follow-up with primary care team if not all better in the next several days, or sooner if worsening     (R30.0) Dysuria  Plan: UA Macroscopic with reflex to Microscopic and         Culture - Clinic Collect, Wet prep - Clinic         Collect, UA Microscopic with Reflex to Culture            (N89.8) Vaginal discharge  Plan: UA Macroscopic with reflex to Microscopic and         Culture - Clinic Collect, Wet prep - Clinic         Collect, UA Microscopic with Reflex to Culture              This progress note has been dictated, with use of voice recognition software. Any grammatical, typographical, or context errors are unintentional and inherent to use of voice recognition software.  -----------------------------    Chief Complaint   Patient presents with    Urgent Care     Possible UTI and yeast infection        Rakel Shaw is a 34 year old female who presents to urgent care clinic today for evaluation of possible bladder or yeast infection.     HPI: Patient reports acute onset of vaginal itching/irritation and some intermittent dysuria 2-3 days ago.     Patient Active Problem List   Diagnosis    Rhabdomyolysis    Breast lump on left side at 6 o'clock position    PCOS (polycystic ovarian syndrome)    BMI 30.0-30.9,adult    Jugular vein thrombosis       Current Outpatient Medications   Medication Sig Dispense Refill    escitalopram (LEXAPRO) 10 MG tablet Take 1 tablet (10 mg) by mouth at bedtime 30 tablet 0     No current facility-administered medications for this visit.       Allergies   Allergen Reactions    Anesthesia S-I-40 [Propofol]      Sister w/ malignant hyperthermia    Seafood Other (See Comments), Difficulty breathing and Rash     Fish/ Shrimp    Morphine  Nausea and Vomiting    Pcn [Penicillins] Hives          ROS:      CONSITUTIONAL: No sudden onset fever, chills or acute onset weakness  CARDIAC: No sudden onset chest pain or shortness of breath   RESP: No sudden onset cough, chest pain or shortness of breath   GI: No abdominal pain. No nausea, vomiting or diarrhea. No acute flank pain.   GYN: . No contraception.No pelvic pain.  Patient offered, but declined, STI and pregnancy screening today.   RHEUM: No sudden onset hot, red, warm joints       OBJECTIVE:  /80   Pulse 86   Temp 98.8  F (37.1  C)   Resp 16   Wt 85.3 kg (188 lb)   SpO2 100%   BMI 29.44 kg/m      LMP 8/19/24 at expected interval.            GENERAL:  Very pleasant, comfortable and generally well appearing.  SKIN: No rashes.  Normal color.  Sclera clear.  CARDIAC:NORMAL - regular rate and rhythm without murmur., normal s1/s2 and without extra heart sounds  RESP: Normal - CTA without rales, rhonchi, or wheezing.  ABDOMEN:  Soft, non-tender, non-distended.  Positive normal bowel sounds.  No HSM or masses.  Positive, mild, suprapubic tenderness.  No CVA tenderness.  NEURO: Alert and oriented.  Normal speech and mentation.  CN II/XII grossly intact.  Gait within normal limits.       Results for orders placed or performed in visit on 09/03/24   UA Macroscopic with reflex to Microscopic and Culture - Clinic Collect     Status: Abnormal    Specimen: Urine, Clean Catch   Result Value Ref Range    Color Urine Yellow Colorless, Straw, Light Yellow, Yellow    Appearance Urine Clear Clear    Glucose Urine Negative Negative mg/dL    Bilirubin Urine Negative Negative    Ketones Urine Trace (A) Negative mg/dL    Specific Gravity Urine >=1.030 1.003 - 1.035    Blood Urine Small (A) Negative    pH Urine 6.0 5.0 - 7.0    Protein Albumin Urine Trace (A) Negative mg/dL    Urobilinogen Urine 1.0 0.2, 1.0 E.U./dL    Nitrite Urine Negative Negative    Leukocyte Esterase Urine Negative Negative   UA  Microscopic with Reflex to Culture     Status: Abnormal   Result Value Ref Range    RBC Urine 5-10 (A) 0-2 /HPF /HPF    WBC Urine 0-5 0-5 /HPF /HPF    Squamous Epithelials Urine Few (A) None Seen /LPF    Narrative    Urine Culture not indicated   Wet prep - Clinic Collect     Status: Abnormal    Specimen: Vagina; Swab   Result Value Ref Range    Trichomonas Absent Absent    Yeast Present (A) Absent    Clue Cells Absent Absent    WBCs/high power field 2+ (A) None

## 2024-09-04 NOTE — PATIENT INSTRUCTIONS
September 3, 2024 Urgent Care Plan:       -Diflucan for vaginal yeast infection   -Follow-up with primary care team if not all better in the next several days, or sooner if worsening

## 2024-09-11 ENCOUNTER — MYC MEDICAL ADVICE (OUTPATIENT)
Dept: OTHER | Facility: CLINIC | Age: 34
End: 2024-09-11
Payer: COMMERCIAL

## 2024-09-12 ENCOUNTER — VIRTUAL VISIT (OUTPATIENT)
Dept: OTHER | Facility: CLINIC | Age: 34
End: 2024-09-12
Attending: SURGERY
Payer: COMMERCIAL

## 2024-09-12 DIAGNOSIS — G54.0 THORACIC OUTLET SYNDROME: ICD-10-CM

## 2024-09-12 DIAGNOSIS — M79.89 RIGHT LEG SWELLING: Primary | ICD-10-CM

## 2024-09-12 PROCEDURE — 99442 PR PHYSICIAN TELEPHONE EVALUATION 11-20 MIN: CPT | Mod: 93 | Performed by: SURGERY

## 2024-09-12 NOTE — PROGRESS NOTES
Rakel is a 34 year old who is being evaluated via a billable telephone visit.    What phone number would you like to be contacted at? [phone]  See [employer] message; pt concerned about right leg pain     How would you like to obtain your AVS? Jorge Deal MA

## 2024-09-12 NOTE — PROGRESS NOTES
[place] [facility]    Rakel Shaw called us and wanted to discuss right leg pain.  He is known to us for a partial occlusion of left medial subclavian vein due to vasogenic TOS.    --4/25/2024: Left transaxillary first rib resection and scalenectomy with mobilization subclavian vein with no preoperative venogram    -- 5/6/2024 venogram: Widely patent left upper arm and axillary vein.  Probable mild stenosis of medial subclavian vein that was venoplasty.    --7/25/2024 venous duplex: Widely patent left axillary and subclavian vein.  Recommended continuing 3 months of Xarelto with follow-up.    TELEPHONE CONSULT:  Patient had called yesterday about her right leg discomfort.  She reports that she overall still tired and sore.  Some mild left shoulder and arm discomfort which can be very common following her interventions.    We have planned for a longer course of Xarelto.  However she stopped this herself in early August when her prescription ran out and she did not request a refill.    She is noted for approximately week some discomfort in her right leg.  Patient was very concerned about this due to her history of the upper extremity DVT.  No history of trauma.  May be some swelling.  She was seen in the ED on 9/3/2024 where they did a urinalysis only and were not overly concerned and ordered no testing including anything to rule out a DVT.    Patient is also concerned about her blood work.  This was completely unremarkable including hemoglobin= 12.9 on 5/6/2024 at the time of her venogram.  Would not suspect this to be abnormal but may discuss with primary care physician to have it rechecked.    IMPRESSION:   #1.  Right leg discomfort.  No other vasogenic TOS and DVTs related to the thoracic outlet she is no longer on anticoagulation and we will perform a venous duplex ultrasound to rule out a DVT in the right leg.  She lives in [facility] and we will schedule this to perform at the [facility]  [facility] with a telephone follow-up.    #2.  She stopped her anticoagulation.  Since it has been off for at least 6 weeks probably no reason at all to restart this.  Still recommend the following duplex 11/2024.      15 minutes on phone call today being completed at 1145 hrs.    Musa Figueroa MD   This note was created using Dragon voice recognition software which may result in transcription errors.

## 2024-09-16 ENCOUNTER — TELEPHONE (OUTPATIENT)
Dept: OTHER | Facility: CLINIC | Age: 34
End: 2024-09-16
Payer: COMMERCIAL

## 2024-09-16 NOTE — TELEPHONE ENCOUNTER
Patient transferred to [facility] by [facility] transportation with 1 bag of belongings. Security present for transfer to [facility] van. Patient father (Justen ) called and informed of transfer. Report was given to Yazmin with [facility]. SVC was maintained.    Per 9/12/24 visit with Dr. Figueroa, pt needs the following:    LLE venous Ultrasound (pt requesting [facility])  Video or Phone appt with Dr. Figueroa to discuss results  Please schedule this at next available    Routing to scheduling to contact patient to coordinate above.    Appt note in order comments.    Hillary Manzanares, BRUNILDAN, RN, [facility]

## 2024-09-25 ENCOUNTER — HOSPITAL ENCOUNTER (OUTPATIENT)
Dept: ULTRASOUND IMAGING | Facility: CLINIC | Age: 34
Discharge: HOME | End: 2024-09-25
Attending: SURGERY | Admitting: SURGERY
Payer: COMMERCIAL

## 2024-09-25 DIAGNOSIS — M79.89 RIGHT LEG SWELLING: ICD-10-CM

## 2024-09-25 PROCEDURE — 93971 EXTREMITY STUDY: CPT | Mod: RT

## 2024-10-21 DIAGNOSIS — F41.8 POSTPARTUM ANXIETY: ICD-10-CM

## 2024-10-21 RX ORDER — ESCITALOPRAM OXALATE 10 MG/1
10 TABLET ORAL AT BEDTIME
Qty: 30 TABLET | Refills: 0 | OUTPATIENT
Start: 2024-10-21

## 2024-10-21 NOTE — TELEPHONE ENCOUNTER
Pt needs a visit - overdue for annual  Previous Dr Go  Has not received a refill since 5/14/24 and was only for 30 days.    Pt due for annual, no appt scheduled. Pt already received one month extension. Rx denied.   Kanchan Gracia RN on 10/21/2024 at 3:32 PM

## 2024-10-21 NOTE — TELEPHONE ENCOUNTER
Requested Prescriptions   Pending Prescriptions Disp Refills    escitalopram (LEXAPRO) 10 MG tablet 30 tablet 0     Sig: Take 1 tablet (10 mg) by mouth at bedtime.       SSRIs Protocol Failed - 10/21/2024  3:25 PM        Failed - HAO-7 score of less than 5 in past 6 months.     Please review last HAO-7 score.           Failed - Recent (12 mo) or future (90 days) visit within the authorizing provider's specialty     The patient must have completed an in-person or virtual visit within the past 12 months or has a future visit scheduled within the next 90 days with the authorizing provider s specialty.  Urgent care and e-visits do not quality as an office visit for this protocol.          Passed - Medication is active on med list        Passed - Medication indicated for associated diagnosis     Medication is associated with one or more of the following diagnoses:              Anxiety             Bipolar  Depression  Obsessive-compulsive disorder             Panic disorder  Postmenopausal flushing             Premenstrual dysphoric disorder             Social phobia   Adjustment disorder with depressed mood   Mood disorder          Passed - Patient is age 18 or older        Passed - No active pregnancy on record        Passed - No positive pregnancy test in last 12 months           Last Written Prescription Date:  05/14/24  Last Fill Quantity: 30,  # refills: 0   Last office visit: 6/29/2023 with prescribing provider:  Dr Shima Go   Future Office Visit:  none

## 2024-10-22 NOTE — PROGRESS NOTES
[place] [facility]    Rakel Shaw has a history of vasogenic TOS. Partial occlusion left medial subclavian vein noted.  No preoperative venogram performed prior to surgery    -- 4/25/2024: Left transaxillary first rib resection and scalenectomy with mobilization subclavian vein    -- 5/6/2024 venogram: Widely patent left upper arm and axillary vein.  Probable mild stenosis median subclavian vein that was venoplasty    -- 7/25/2024 venous duplex: Widely patent axillary subclavian vein.  Recommended 3 months additional Xarelto with patient stopped medication in August.    PRESENT SITUATION: Overall doing very well.  Taking no anticoagulations including aspirin since her last visit.  No left arm swelling.  Does occasionally have a tight.  I will around her left upper arm with some tingling.  Also some mild tingling in the axillary area.  No issues at all with her dominant right arm  But also want to ensure that the patient was not having significant right dominant arm venous compression with maneuvers.    Exam: Alert and appropriate pleasant and talkative   .Here with her significant other  `Blood pressure 135/83 right arm.  Pulse 81    Well-healed left axillary incisions.  No swelling.    Left arm venous duplex was performed revealing no thrombus or DVT with good flow.        Right arm subclavian vein with maneuvers is normal at neutral and 90 degrees measuring 12.1 mm.  With her arm in the 180 degrees this does decrease to 6.5 mm.  No other changes with other maneuvers.          IMPRESSION:   #1.  Widely patent left axillary/subclavian vein following treatment for vasogenic TOS.  Intermittent numbness and tingling which can be a result of the surgical procedure retraction of the brachial plexus.  I would expect that this should improve with time though she still may have some axillary numbness due to the surgical incision and potential injury to the second intercostal brachial cutaneous nerve.   Conservative treatment is indicated.    #2.  Mild compression of the right subclavian vein anatomy degrees.  Very unlikely this will lead to a DVT and a dominant right arm since this is the position that she is not having often.  I would not recommend any surgical decompression of the thoracic outlet with these findings and discussed reasoning with her and her significant other.    #3.  No neurogenic TOS symptoms on the right arm noted.    No specific vascular follow-up is indicated.    20 minutes today with patient.  Follow-up as needed.    Musa Figueroa MD   This note was created using Dragon voice recognition software which may result in transcription errors.

## 2024-10-23 ENCOUNTER — HOSPITAL ENCOUNTER (EMERGENCY)
Facility: CLINIC | Age: 34
Discharge: HOME | End: 2024-10-23
Attending: EMERGENCY MEDICINE | Admitting: EMERGENCY MEDICINE
Payer: COMMERCIAL

## 2024-10-23 ENCOUNTER — APPOINTMENT (OUTPATIENT)
Dept: CT IMAGING | Facility: CLINIC | Age: 34
End: 2024-10-23
Attending: EMERGENCY MEDICINE
Payer: COMMERCIAL

## 2024-10-23 VITALS
DIASTOLIC BLOOD PRESSURE: 72 MMHG | TEMPERATURE: 97.8 F | BODY MASS INDEX: 29.76 KG/M2 | RESPIRATION RATE: 18 BRPM | HEIGHT: 67 IN | WEIGHT: 189.6 LBS | HEART RATE: 69 BPM | SYSTOLIC BLOOD PRESSURE: 123 MMHG | OXYGEN SATURATION: 100 %

## 2024-10-23 DIAGNOSIS — R10.9 LEFT FLANK PAIN: ICD-10-CM

## 2024-10-23 DIAGNOSIS — D50.9 IRON DEFICIENCY ANEMIA, UNSPECIFIED IRON DEFICIENCY ANEMIA TYPE: ICD-10-CM

## 2024-10-23 DIAGNOSIS — R31.9 HEMATURIA, UNSPECIFIED TYPE: ICD-10-CM

## 2024-10-23 LAB
ALBUMIN UR-MCNC: 20 MG/DL
ANION GAP SERPL CALCULATED.3IONS-SCNC: 14 MMOL/L (ref 7–15)
APPEARANCE UR: CLEAR
BASOPHILS # BLD AUTO: 0.1 10E3/UL (ref 0–0.2)
BASOPHILS NFR BLD AUTO: 0 %
BILIRUB UR QL STRIP: NEGATIVE
BUN SERPL-MCNC: 11.5 MG/DL (ref 6–20)
CALCIUM SERPL-MCNC: 8.7 MG/DL (ref 8.8–10.4)
CHLORIDE SERPL-SCNC: 102 MMOL/L (ref 98–107)
COLOR UR AUTO: YELLOW
CREAT SERPL-MCNC: 0.74 MG/DL (ref 0.51–0.95)
EGFRCR SERPLBLD CKD-EPI 2021: >90 ML/MIN/1.73M2
EOSINOPHIL # BLD AUTO: 0.2 10E3/UL (ref 0–0.7)
EOSINOPHIL NFR BLD AUTO: 1 %
ERYTHROCYTE [DISTWIDTH] IN BLOOD BY AUTOMATED COUNT: 14.9 % (ref 10–15)
GLUCOSE SERPL-MCNC: 98 MG/DL (ref 70–99)
GLUCOSE UR STRIP-MCNC: NEGATIVE MG/DL
HCG UR QL: NEGATIVE
HCO3 SERPL-SCNC: 22 MMOL/L (ref 22–29)
HCT VFR BLD AUTO: 34.4 % (ref 35–47)
HGB BLD-MCNC: 10.3 G/DL (ref 11.7–15.7)
HGB UR QL STRIP: ABNORMAL
HOLD SPECIMEN: NORMAL
HOLD SPECIMEN: NORMAL
IMM GRANULOCYTES # BLD: 0.1 10E3/UL
IMM GRANULOCYTES NFR BLD: 0 %
KETONES UR STRIP-MCNC: ABNORMAL MG/DL
LEUKOCYTE ESTERASE UR QL STRIP: NEGATIVE
LYMPHOCYTES # BLD AUTO: 4.1 10E3/UL (ref 0.8–5.3)
LYMPHOCYTES NFR BLD AUTO: 30 %
MCH RBC QN AUTO: 22 PG (ref 26.5–33)
MCHC RBC AUTO-ENTMCNC: 29.9 G/DL (ref 31.5–36.5)
MCV RBC AUTO: 74 FL (ref 78–100)
MONOCYTES # BLD AUTO: 0.7 10E3/UL (ref 0–1.3)
MONOCYTES NFR BLD AUTO: 5 %
MUCOUS THREADS #/AREA URNS LPF: PRESENT /LPF
NEUTROPHILS # BLD AUTO: 8.8 10E3/UL (ref 1.6–8.3)
NEUTROPHILS NFR BLD AUTO: 63 %
NITRATE UR QL: NEGATIVE
NRBC # BLD AUTO: 0 10E3/UL
NRBC BLD AUTO-RTO: 0 /100
PH UR STRIP: 5.5 [PH] (ref 5–7)
PLATELET # BLD AUTO: 458 10E3/UL (ref 150–450)
POTASSIUM SERPL-SCNC: 4.2 MMOL/L (ref 3.4–5.3)
RBC # BLD AUTO: 4.68 10E6/UL (ref 3.8–5.2)
RBC URINE: 13 /HPF
SODIUM SERPL-SCNC: 138 MMOL/L (ref 135–145)
SP GR UR STRIP: 1.03 (ref 1–1.03)
SQUAMOUS EPITHELIAL: 4 /HPF
UROBILINOGEN UR STRIP-MCNC: NORMAL MG/DL
WBC # BLD AUTO: 13.9 10E3/UL (ref 4–11)
WBC URINE: 2 /HPF

## 2024-10-23 PROCEDURE — 250N000009 HC RX 250: Performed by: EMERGENCY MEDICINE

## 2024-10-23 PROCEDURE — 250N000011 HC RX IP 250 OP 636: Performed by: EMERGENCY MEDICINE

## 2024-10-23 PROCEDURE — 81001 URINALYSIS AUTO W/SCOPE: CPT | Performed by: EMERGENCY MEDICINE

## 2024-10-23 PROCEDURE — 85025 COMPLETE CBC W/AUTO DIFF WBC: CPT | Performed by: EMERGENCY MEDICINE

## 2024-10-23 PROCEDURE — 36415 COLL VENOUS BLD VENIPUNCTURE: CPT | Performed by: EMERGENCY MEDICINE

## 2024-10-23 PROCEDURE — 99285 EMERGENCY DEPT VISIT HI MDM: CPT | Mod: 25

## 2024-10-23 PROCEDURE — 81025 URINE PREGNANCY TEST: CPT | Performed by: EMERGENCY MEDICINE

## 2024-10-23 PROCEDURE — 250N000013 HC RX MED GY IP 250 OP 250 PS 637: Performed by: EMERGENCY MEDICINE

## 2024-10-23 PROCEDURE — 74177 CT ABD & PELVIS W/CONTRAST: CPT

## 2024-10-23 PROCEDURE — 80048 BASIC METABOLIC PNL TOTAL CA: CPT | Performed by: EMERGENCY MEDICINE

## 2024-10-23 RX ORDER — ACETAMINOPHEN 325 MG/1
975 TABLET ORAL ONCE
Status: COMPLETED | OUTPATIENT
Start: 2024-10-23 | End: 2024-10-23

## 2024-10-23 RX ORDER — OXYCODONE HYDROCHLORIDE 5 MG/1
5 TABLET ORAL EVERY 6 HOURS PRN
Qty: 10 TABLET | Refills: 0 | Status: SHIPPED | OUTPATIENT
Start: 2024-10-23 | End: 2024-10-24

## 2024-10-23 RX ORDER — LIDOCAINE 4 G/G
1 PATCH TOPICAL EVERY 24 HOURS
Qty: 7 PATCH | Refills: 0 | Status: SHIPPED | OUTPATIENT
Start: 2024-10-23 | End: 2024-10-30

## 2024-10-23 RX ORDER — IOPAMIDOL 755 MG/ML
500 INJECTION, SOLUTION INTRAVASCULAR ONCE
Status: COMPLETED | OUTPATIENT
Start: 2024-10-23 | End: 2024-10-23

## 2024-10-23 RX ORDER — LIDOCAINE 4 G/G
1 PATCH TOPICAL ONCE
Status: DISCONTINUED | OUTPATIENT
Start: 2024-10-23 | End: 2024-10-24 | Stop reason: HOSPADM

## 2024-10-23 RX ADMIN — SODIUM CHLORIDE 64 ML: 9 INJECTION, SOLUTION INTRAVENOUS at 20:24

## 2024-10-23 RX ADMIN — ACETAMINOPHEN 975 MG: 325 TABLET, FILM COATED ORAL at 18:28

## 2024-10-23 RX ADMIN — LIDOCAINE 1 PATCH: 4 PATCH TOPICAL at 22:07

## 2024-10-23 RX ADMIN — IOPAMIDOL 95 ML: 755 INJECTION, SOLUTION INTRAVENOUS at 20:24

## 2024-10-23 ASSESSMENT — COLUMBIA-SUICIDE SEVERITY RATING SCALE - C-SSRS
6. HAVE YOU EVER DONE ANYTHING, STARTED TO DO ANYTHING, OR PREPARED TO DO ANYTHING TO END YOUR LIFE?: NO
1. IN THE PAST MONTH, HAVE YOU WISHED YOU WERE DEAD OR WISHED YOU COULD GO TO SLEEP AND NOT WAKE UP?: NO
2. HAVE YOU ACTUALLY HAD ANY THOUGHTS OF KILLING YOURSELF IN THE PAST MONTH?: NO

## 2024-10-23 ASSESSMENT — ACTIVITIES OF DAILY LIVING (ADL)
ADLS_ACUITY_SCORE: 0

## 2024-10-23 NOTE — ED TRIAGE NOTES
Pt arrives for L lower back pain starting Monday. Reports recent treatment for yeast infection and UTI, wants to make sure kidneys are okay. Denies urinary symptoms at this time. CMS intact. Took ibuprofen at 1200.

## 2024-10-23 NOTE — ED PROVIDER NOTES
"Emergency Department Note      Code Status: Prior    History of Present Illness     Chief Complaint:  Flank Pain      HPI   Rakel Shaw is a 34 year old female with a history of fibroadenoma, breast lump, and rhabdomyolysis who presents to the ER for lower left flank pain that gets worse at night, when she is not moving, and when she got a massage and radiates to the right. Patient reports flank pain starting 2 days ago but getting bad yesterday. She states that she has had a hard time sitting at work and that she has tried heat, tylenol, and Advil with little alleviation. She endorses only left sided pain and finishing her antibiotics for UTI a week ago. Rakel denies new urinary symptoms, previous urinary symptoms from a UTI, radiation of pain to legs, or vomiting.     Independent Historian:    None    Review of External Notes  I reviewed telephone encounter 10/8/24 regarding patient's treatment of UTI with Macrobid and diphlocan and flagel for treatment of positive BV test.     Past Medical History   Medical History, Surgical History, Problem List, and Medications  Reviewed in Epic    Physical Exam   Patient Vitals for the past 24 hrs:   BP Temp Temp src Pulse Resp SpO2 Height Weight   10/23/24 1738 (!) 152/91 97.8  F (36.6  C) Temporal 91 18 100 % 1.702 m (5' 7\") 86 kg (189 lb 9.5 oz)       Physical Exam  Constitutional: Vital signs reviewed as above.   Eyes: PEERL, EOMI B/L  Neck: No JVD noted. FROM   Cardiovascular: normal rate, Regular rhythm and normal heart sounds.  No murmur heard. Equal B/L peripheral pulses.  Pulmonary/Chest: Effort normal and breath sounds normal. No respiratory distress. Patient has no wheezes. Patient has no rales.   Gastrointestinal: Soft. There is no significant tenderness.   Musculoskeletal/Extremities: No pitting edema noted. There is no midline Tor L spine tenderness. There is mild left CVA tenderness.   Skin: Skin is warm and dry. There is no diaphoresis noted. NO rash in noted on " the left flank.   Psychiatric: The patient appears calm.     Diagnostics     Laboratory: Imaging:   Labs Ordered and Resulted from Time of ED Arrival to Time of ED Departure   ROUTINE UA WITH MICROSCOPIC REFLEX TO CULTURE - Abnormal       Result Value    Color Urine Yellow      Appearance Urine Clear      Glucose Urine Negative      Bilirubin Urine Negative      Ketones Urine Trace (*)     Specific Gravity Urine 1.034      Blood Urine Moderate (*)     pH Urine 5.5      Protein Albumin Urine 20 (*)     Urobilinogen Urine Normal      Nitrite Urine Negative      Leukocyte Esterase Urine Negative      Mucus Urine Present (*)     RBC Urine 13 (*)     WBC Urine 2      Squamous Epithelials Urine 4 (*)    BASIC METABOLIC PANEL - Abnormal    Sodium 138      Potassium 4.2      Chloride 102      Carbon Dioxide (CO2) 22      Anion Gap 14      Urea Nitrogen 11.5      Creatinine 0.74      GFR Estimate >90      Calcium 8.7 (*)     Glucose 98     CBC WITH PLATELETS AND DIFFERENTIAL - Abnormal    WBC Count 13.9 (*)     RBC Count 4.68      Hemoglobin 10.3 (*)     Hematocrit 34.4 (*)     MCV 74 (*)     MCH 22.0 (*)     MCHC 29.9 (*)     RDW 14.9      Platelet Count 458 (*)     % Neutrophils 63      % Lymphocytes 30      % Monocytes 5      % Eosinophils 1      % Basophils 0      % Immature Granulocytes 0      NRBCs per 100 WBC 0      Absolute Neutrophils 8.8 (*)     Absolute Lymphocytes 4.1      Absolute Monocytes 0.7      Absolute Eosinophils 0.2      Absolute Basophils 0.1      Absolute Immature Granulocytes 0.1      Absolute NRBCs 0.0     HCG QUALITATIVE URINE - Normal    hCG Urine Qualitative Negative       CT Abdomen Pelvis w Contrast   Final Result   IMPRESSION:    1.  No acute abnormality in the abdomen or pelvis.    2.  Left ovarian simple cyst measures 2.2 cm. No follow up needed.            Independent Interpretation  See ED course    ED Course    Medications Administered  Medications   Lidocaine (LIDOCARE) 4 % Patch 1 patch  (has no administration in time range)   acetaminophen (TYLENOL) tablet 975 mg (975 mg Oral $Given 10/23/24 1828)   iopamidol (ISOVUE-370) solution 500 mL (95 mLs Intravenous $Given 10/23/24 2024)   CT scan flush (64 mLs Intravenous $Given 10/23/24 2024)       Discussion of Management  See ED Course    ED Course  ED Course as of 10/23/24 2202   Wed Oct 23, 2024   1801 I obtained the history and examined the patient as noted above.    1905 Updated patient on lab results and plan for CT.   2146 Rechecked and updated.   2146 I rechecked patient and explained findings and plan of care.         Optional/Additional Documentation: None    Medical Decision Making / Diagnosis     MIPS     None    Medical Decision Making:  This 34-year-old presents due to flank pain.  Please see the HPI and exam for specifics.  A broad differential was considered including muscle strain, urinary/kidney infection, ureteral stone, diverticulitis, etc.  The above workup was undertaken.    Findings are notable for anemia and leukocytosis.  CT is reassuring and does not demonstrate gross signs of pyelonephritis, diverticulitis, bowel obstruction, or ureteral stone.  There is a left-sided simple ovarian cyst noted.  The patient does not seem to have significant abdominal or pelvic tenderness on exam.  After shared decision-making discussion, ultrasound imaging was deferred.  Ultimately, the patient was discharged home with the below medications.  She should follow with primary care and return with new or worsening symptoms.      Anticipatory guidance given prior to discharge        Critical Care:  None.    Disposition:  See ED Course and MDM    ICD-10 Codes:    ICD-10-CM    1. Left flank pain  R10.9       2. Hematuria, unspecified type  R31.9       3. Iron deficiency anemia, unspecified iron deficiency anemia type  D50.9            Discharge Medications:  New Prescriptions    LIDOCAINE (LIDOCARE) 4 % PATCH    Place 1 patch over 12 hours onto the  skin every 24 hours for 7 days. To prevent lidocaine toxicity, patient should be patch free for 12 hrs daily.    OXYCODONE (ROXICODONE) 5 MG TABLET    Take 1 tablet (5 mg) by mouth every 6 hours as needed for severe pain.        10/23/2024   Raleigh Concepcion,      [employer]        Raleigh Concepcion,   10/23/24 [id number]

## 2024-10-24 ENCOUNTER — OFFICE VISIT (OUTPATIENT)
Dept: OTHER | Facility: CLINIC | Age: 34
End: 2024-10-24
Attending: SURGERY
Payer: COMMERCIAL

## 2024-10-24 ENCOUNTER — HOSPITAL ENCOUNTER (OUTPATIENT)
Dept: ULTRASOUND IMAGING | Facility: CLINIC | Age: 34
Discharge: HOME | End: 2024-10-24
Attending: SURGERY
Payer: COMMERCIAL

## 2024-10-24 VITALS — DIASTOLIC BLOOD PRESSURE: 83 MMHG | HEART RATE: 81 BPM | SYSTOLIC BLOOD PRESSURE: 135 MMHG

## 2024-10-24 DIAGNOSIS — G54.0 TOS (THORACIC OUTLET SYNDROME): Primary | ICD-10-CM

## 2024-10-24 DIAGNOSIS — G54.0 THORACIC OUTLET SYNDROME: ICD-10-CM

## 2024-10-24 PROCEDURE — 99213 OFFICE O/P EST LOW 20 MIN: CPT | Performed by: SURGERY

## 2024-10-24 PROCEDURE — 93970 EXTREMITY STUDY: CPT

## 2024-10-24 NOTE — PROGRESS NOTES
[facility]        Patient is here for a  follow up.    Pt is currently taking no meds that would impact our treatment plan.    /83 (BP Location: Right arm, Patient Position: Chair, Cuff Size: Adult Regular)   Pulse 81     The provider has been notified that the patient has no concerns.     Questions patient would like addressed today are: N/A.    Refills are needed: N/A    Has homecare services and agency name:  Susana Deal MA

## 2024-11-25 NOTE — CARE PLAN
Pt. admitted from L&D  via w/c and transferred to bed with sba. Pt. arrived with baby and was accompanied by mother and arrived with personal belongings. Report was taken from Yana in L&D. VSS. Fundus is firm and midline.  Vaginal bleeding is scant.  SL in hand.  Pt. oriented to the room and call light system.  
CONSTITUTIONAL: Well-appearing;  in no apparent distress.   HEAD: Normocephalic; atraumatic.   EYES: PERRL; EOM intact; conjunctiva and sclera clear  ENT: normal nose; no rhinorrhea; normal pharynx with no erythema or lesions.   NECK: Supple; non-tender; no LAD  CARDIOVASCULAR: Regular rate and rhythm.   RESPIRATORY: Breathing easily;   GI: Soft; non-distended; non-tender; no palpable organomegaly.   MSK: FROM at all extremities, normal tone; no CVAT

## 2025-01-06 ENCOUNTER — LAB (OUTPATIENT)
Dept: LAB | Facility: CLINIC | Age: 35
End: 2025-01-06
Payer: COMMERCIAL

## 2025-01-06 ENCOUNTER — TELEPHONE (OUTPATIENT)
Dept: OBGYN | Facility: CLINIC | Age: 35
End: 2025-01-06

## 2025-01-06 DIAGNOSIS — I82.90 THROMBOSIS: Primary | ICD-10-CM

## 2025-01-06 DIAGNOSIS — Z32.01 PREGNANCY TEST POSITIVE: ICD-10-CM

## 2025-01-06 LAB — HCG INTACT+B SERPL-ACNC: 3403 MIU/ML

## 2025-01-06 PROCEDURE — 84702 CHORIONIC GONADOTROPIN TEST: CPT

## 2025-01-06 PROCEDURE — 36415 COLL VENOUS BLD VENIPUNCTURE: CPT

## 2025-01-06 NOTE — TELEPHONE ENCOUNTER
[facility] Call Center    Phone Message    May a detailed message be left on voicemail: yes     Reason for Call: Other: Pt is requesting to be seen earlier than her scheduled prenatal appts due to having a clot in April and worrying about her health. Pt is scheduled for soonest appts with timeline and schedule. Please review and advise with pt.      Action Taken: Message routed to:  Other: [facility]    Travel Screening: Not Applicable     Date of Service:

## 2025-01-06 NOTE — RESULT ENCOUNTER NOTE
Appropriately increasing beta hcg, recommend patient call to schedule new Ob visit, US, and nurse visit between 8-10 weeks. Routed to scheduling to call to get patient set up.    JOHN Jones, CNM

## 2025-01-06 NOTE — TELEPHONE ENCOUNTER
LMP 11/29/24 - 5w3d  Previous Dr. Go pt       Upcoming provider appts scheduled, nurse visit 1/29 and 1st provider visit 1/31/25 with Dr. Vazquez, will be 9 weeks at time of appt    Hx left upper extremity DVT in April 2024    LMTCB    Marie Powell, RN on 1/6/2025 at 4:15 PM  [facility] Triage

## 2025-01-08 ENCOUNTER — TELEPHONE (OUTPATIENT)
Dept: OBGYN | Facility: CLINIC | Age: 35
End: 2025-01-08
Payer: COMMERCIAL

## 2025-01-08 NOTE — TELEPHONE ENCOUNTER
I am not familiar with this patient's history.  Looks like she was an Enadeghe/CNM pt.  If there are not any openings prior to 9 weeks then we will have to leave it at that.  I do recommend MFM consult ASAP given history of clot

## 2025-01-08 NOTE — TELEPHONE ENCOUNTER
Spoke with pt, per Rakel's request will send a [employer] message with [facility]'s contact info.  Cynthia Ospina RN on 1/8/2025 at 12:33 PM

## 2025-01-09 DIAGNOSIS — I82.890 JUGULAR VEIN THROMBOSIS: Primary | ICD-10-CM

## 2025-01-09 DIAGNOSIS — F41.9 ANXIETY: ICD-10-CM

## 2025-01-09 DIAGNOSIS — E28.2 PCOS (POLYCYSTIC OVARIAN SYNDROME): ICD-10-CM

## 2025-01-18 NOTE — PROGRESS NOTES
[facility]  [address] [place] [address] [address], [place] [address]  Main: [phone], Fax: [phone]       Rakel Shaw  : 1990  MRN: [id number]    REFERRAL: Dr. Vazquez     [facility]     Rakel Shaw is a 34 year old  at 7w 3d by LMP, consistent with 7w 0d ultrasound here for MFM consultation regarding her history of thromboembolism.  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 On 2024 she was admitted for treatment of a left subclavian and distal IJ thrombus, and was diagnosed with thoracic outlet syndrome. She underwent a left transaxillary first rib resection and scalenectomy with mobilization subclavian vein. Postoperative venogram on 2024 demonstrated a widely patent left upper arm and axillary vein. On 2024 ultrasound again demonstrated a widely patent axillary subclavian vein, and it was recommended at that time that she continue Xarelto for an additional three months, although she stopped the medication in 2024. She follows with Dr. Musa Figueroa, Vascular Surgery. She did not have a thrombophilia evaluation due to diagnosis of thoracic outlet syndrome was determined to be the etiology for the thromboembolism. She was on Elly oral contraceptives at the time of the thromboembolism, and discontinued them at the time of diagnosis.    Rakel has a history of preeclampsia in her first pregnancy. She underwent induction of labor and had a VAVD with shoulder dystocia (<30 seconds) and 3rd degree laceration. She met criteria for preeclampsia with severe features prior to discharge with a peak creatinine of 1.13 mg/dL. She does not have a history of chronic hypertension. While she does have two documented elevated blood pressures (139/95 on 2024, 152/91 on 10/23/2024) outside of pregnancy, these were non-sustained and associated with ED visits.    Rakel has questions today regarding delivery  given her history VAVD with shoulder dystocia. She also has been utilizing nicotine replacement therapy in the form of gum and inquired about the safety of this.    Prenatal Care:  Primary OB care this pregnancy is scheduled with Dr. Vazquez from [facility].     OB History    Para Term  AB Living   2 1 1 0 0 1   SAB IAB Ectopic Multiple Live Births   0 0 0 0 1      # Outcome Date GA Lbr Mike/2nd Weight Sex Type Anes PTL Lv   2 Current            1 Term 23 37w1d 01:55 / 04:00 3.32 kg (7 lb 5.1 oz) F Vag-Vacuum EPI N AARON      Complications: Preeclampsia/Hypertension      Name: Florentin      Apgar1: 7  Apgar5: 9     Past Medical History     Past Medical History:   Diagnosis Date    Anxiety     IUD (intrauterine device) in place 2023    Placed 2023     Type: Mirena  LOT [id number]  Remove/Replace by: 2031     Expiration Date:  2026    Mild pre-eclampsia 2023    PCOS (polycystic ovarian syndrome)     Uncomplicated asthma     as child     Past Surgical History     Past Surgical History:   Procedure Laterality Date    HEAD & NECK SURGERY      teeth extraction younger    IR UPPER EXTREMITY VENOGRAM LEFT  2024    TRANSAXILLARY RESECT FIRST RIB Left 2024    Procedure: EXCISION, RIB, FIRST, TRANSAXILLARY APPROACH;  Surgeon: Musa Figueroa MD;  Location:  OR     Medication List   Lexapro   Prenatal vitamin  Aspirin    Allergies   Anesthesia s-i-40 [propofol], Seafood, Morphine, and Pcn [penicillins]    Social History   Denies use of alcohol, drugs or smoking. Vaped for a short period of time after her first delivery, and currently using nicotine replacement therapy in the form of gum up to 2-3 times per week.    Family History   Please see genetic counseling note for detailed 3-generation family history.    Review of Systems   10-point ROS negative except as in HPI.    Physical Exam   /80 (BP Location: Right arm, Patient Position: Chair,  "Cuff Size: Adult Regular)   Pulse 70   Resp 16   LMP 2024   SpO2 99%     Gen: NAD  Abd: Gravid, non-tender  Ext: No edema    Ultrasound   See today's ultrasound report under the \"imaging\" tab.    Other Pertinent Evaluation     Narrative & Impression   US UPPER EXTREMITY VENOUS DUPLEX BILATERAL  10/24/2024 2:47 PM      HISTORY: History of left upper arm subclavian deep vein thrombosis,  status post left transaxillary first rib resection on 2024,  follow up deep vein thrombosis; evaluate right arm with maneuvers to  evaluate for thoracic outlet syndrome.     COMPARISON: None.     FINDINGS: Color Doppler and spectral waveform analysis was performed  throughout the veins of the left upper extremity and the right  subclavian vein with the arm in neutral, 90 degrees, 180 degrees,   press,  press head turned right and  press  head turned left.      Right: The right subclavian vein is patent with all maneuvers. No  evidence of compression or occlusion. Normal venous waveforms.     Left: The left jugular vein demonstrates normal compressibility,  color-flow and spectral waveform.     The left subclavian vein, axillary vein, cephalic vein, brachial vein  and basilic vein demonstrate normal compressibility, spectral  waveform, color flow and augmentation                                                                      IMPRESSION:   1. Right subclavian vein is patent with all maneuvers.  2. No deep vein thrombosis in the left upper extremity.      BETHANY FERRER,          Assessment/Counseling     Rakel Shaw is a 34 year old  at 7w 3d by LMP, consistent with 7w 0d ultrasound here for M consultation regarding her history of thromboembolism secondary to thoracic outlet syndrome.    History of DVT in the setting of thoracic outlet syndrome and oral contraceptives  We discussed that there is data to guide management of pregnancy following history of thromboembolism secondary " to thoracic outlet syndrome. Her history of DVT may be considered acquired given the presence of thoracic outlet syndrome at the time, which has since been surgically corrected. There may have been an additional estrogen related component as she was on oral contraceptives at the time. While she has not had evaluation for inherited or acquired thrombophilia, I don't think this is warranted given the history thoracic outlet syndrome at the time of her DVT was diagnosed. We discussed strategies to reduce the risk for thromboembolism in the current pregnancy, including clinical surveillance with postpartum thromboprophylaxis versus thrombopathy during pregnancy and the six week postpartum period. After shared decision making today Rakel desires to initiate Lovenox now and continue for six weeks postpartum.     Remote history of vaping  Rakel is not currently vaping, and had questions related to the safety of nicotine replacement therapy in pregnancy. We discussed that vaping or tobacco use in pregnancy is associated with many risks to mother and fetus.  In addition to the better-known risks to the patient, including cardiovascular and pulmonary risks, the offspring of patients who smoke in pregnancy are at higher risk of fetal growth restriction, placental abruption, stillbirth, asthma, and increased rates of sudden infant death syndrome (SIDS).  We discussed that nicotine replacement therapy is recommended over return to use, and is not associated with an increased risk for congenital anomalies.    History of shoulder dystocia  We discussed that the risk of shoulder dystocia recurring in a subsequent pregnancy is up to 30-fold higher than the general population. Risk factors for shoulder dystocia include a history of shoulder dystocia, operative vaginal delivery and a prolonged second stage. We reviewed the fetal/ risks associated with shoulder dystocia, including the risk for brachial plexus injury, as well as  hypoxic ischemic encephalopathy or death in cases of severe shoulder dystocia. We discussed that the estimated birthweight in the current pregnancy is also important to consider when deciding the optimal route of delivery. Recommended that Rakel continue to discuss the route of delivery with Dr. Vazquez as the pregnancy progresses.    Recommendations     Genetic screening  - Met with genetic counseling (see separate note for full details)  - Planning carrier screening for her and her partner today    Medications  - Aspirin 81 mg daily is recommended to continue throughout her pregnancy to reduce the risk for recurrent preeclampsia  - Lovenox 40 mg subcutaneous daily for thromboembolism prophylaxis. This can be started now or at the time of her new OB visit in one week    Laboratory evaluation  - Obtain baseline preeclampsia labs  - Hgb A1c with new OB labs    Maternal antepartum management  - Close monitoring of blood pressure in the third trimester    Ultrasound surveillance  - Anatomy ultrasound is recommended at 20 weeks gestation  - Consider an assessment of fetal growth at around 36 weeks gestation to assist with delivery planning    Timing and mode of delivery  - Delivery is recommended at 39 weeks gestation to facilitate timing of delivery off anticoagulation. Lovenox should be help at least 12 hours prior to neuraxial anesthesia.  - The route of delivery should be based on the usual obstetric indications, with on-going counseling regarding the route of delivery as the pregnancy progresses.    Postpartum management   - Continue Lovenox 40 mg subcutaneous daily for six weeks postpartum    At the end of our discussion, Ms. Shaw indicated that her questions were answered and she seemed satisfied with our discussion.  Thank you for allowing us to participate in the care of your patient. Please do not hesitate to contact us if you have further questions regarding the management of your patient.     Bijal Thomas,  MD  Specialist in Maternal-Fetal Medicine       I spent a total of 45 minutes on the date of this encounter including preparing to see the patient (reviewing medical records/tests), counseling and discussing the plan of care, documenting the visit in the electronic medical record, and communicating with other health care professionals and/or care coordination.

## 2025-01-20 ENCOUNTER — OFFICE VISIT (OUTPATIENT)
Dept: MATERNAL FETAL MEDICINE | Facility: CLINIC | Age: 35
End: 2025-01-20
Attending: OBSTETRICS & GYNECOLOGY
Payer: COMMERCIAL

## 2025-01-20 ENCOUNTER — LAB (OUTPATIENT)
Dept: LAB | Facility: CLINIC | Age: 35
End: 2025-01-20
Attending: OBSTETRICS & GYNECOLOGY
Payer: COMMERCIAL

## 2025-01-20 ENCOUNTER — HOSPITAL ENCOUNTER (OUTPATIENT)
Dept: ULTRASOUND IMAGING | Facility: CLINIC | Age: 35
Discharge: HOME | End: 2025-01-20
Attending: OBSTETRICS & GYNECOLOGY
Payer: COMMERCIAL

## 2025-01-20 VITALS
OXYGEN SATURATION: 99 % | HEART RATE: 70 BPM | SYSTOLIC BLOOD PRESSURE: 121 MMHG | DIASTOLIC BLOOD PRESSURE: 80 MMHG | RESPIRATION RATE: 16 BRPM

## 2025-01-20 DIAGNOSIS — E28.2 PCOS (POLYCYSTIC OVARIAN SYNDROME): ICD-10-CM

## 2025-01-20 DIAGNOSIS — F41.9 ANXIETY: ICD-10-CM

## 2025-01-20 DIAGNOSIS — Z31.430 ENCOUNTER OF FEMALE FOR TESTING FOR GENETIC DISEASE CARRIER STATUS FOR PROCREATIVE MANAGEMENT: Primary | ICD-10-CM

## 2025-01-20 DIAGNOSIS — I82.890 JUGULAR VEIN THROMBOSIS: ICD-10-CM

## 2025-01-20 DIAGNOSIS — Z31.430 ENCOUNTER OF FEMALE FOR TESTING FOR GENETIC DISEASE CARRIER STATUS FOR PROCREATIVE MANAGEMENT: ICD-10-CM

## 2025-01-20 PROCEDURE — 99214 OFFICE O/P EST MOD 30 MIN: CPT | Performed by: OBSTETRICS & GYNECOLOGY

## 2025-01-20 PROCEDURE — 96041 GENETIC COUNSELING SVC EA 30: CPT

## 2025-01-20 PROCEDURE — 76801 OB US < 14 WKS SINGLE FETUS: CPT

## 2025-01-20 PROCEDURE — 36415 COLL VENOUS BLD VENIPUNCTURE: CPT

## 2025-01-20 RX ORDER — ASPIRIN 81 MG/1
81 TABLET, CHEWABLE ORAL DAILY
COMMUNITY

## 2025-01-20 ASSESSMENT — PAIN SCALES - GENERAL: PAINLEVEL_OUTOF10: NO PAIN (0)

## 2025-01-20 NOTE — NURSING NOTE
Rakel seen in clinic today for genetic counseling, ultrasound, and MFM consult at 7w3d gestation due to hx DVT and preeclampsia (see report/notes). VSS. Pt denies vaginal bleeding or cramping. Dr. Thomas met with pt and discussed POC. Plan to carrier screening today. Pt discharged stable and ambulatory.     Martha Hurt RN

## 2025-01-20 NOTE — PROGRESS NOTES
"[facility]  Genetic Counseling Consult    Patient:  Rakel Shaw YOB: 1990   Date of Service:  25   MRN: [id number]    Rakel was seen at the [facility] [place] for genetic consultation. The indication for genetic counseling is personal medical history. The patient was accompanied to this visit by their partner, Nicholas.    The session was conducted in English.      IMPRESSION/ PLAN   1. Rakel has not had genetic screening in this pregnancy. She is currently to early for NIPT so she will complete this with her OB or reach back out to [facility] to have NIPT coordinated.     2. During today's [facility] visit, Rakel had a blood draw for carrier screening. Results are expected in 2-3 weeks. The patient will be called with results and if they do not answer they requested a detailed message with results on their voicemail.Patient was informed that results will be available in [facility].     3. Rakel had a first trimester complete ultrasound today. Please see the ultrasound report for further details.       PREGNANCY HISTORY   /Parity:       Rakel's pregnancy history is significant for:   2023 term    CURRENT PREGNANCY   Current Age: 34 year old     Age at Delivery: 35 year old    MITESH: 2025, by Last Menstrual Period                                     Gestational Age: 7w3d    This pregnancy is a single gestation.     This pregnancy was conceived spontaneously.    Rakel reports no bleeding, complications, illnesses, fever or exposure concerns with this pregnancy.     MEDICAL HISTORY   Rakel s reported medical history includes a DVT, and preeclampsia with her prior pregnancy. She had a [facility] consult today please see note for further details.   FAMILY HISTORY   A three-generation pedigree was obtained today and is scanned under the \"Media\" tab in Epic. The family history was reported by Rakel and their partner.    The following " significant findings were reported today:   Rakel's sister had thyroid cancer at 35yrs.   Rakel's father had a heart attack. She reports that it was likely due to his history of smoking tobacco.   The father of the pregnancy, Nicholas (31yrs), is healthy  Nicholas's youngest sister had seizures when she was little.   Seizure disorders including epilepsy can be isolated or part of a broader genetic syndrome. When isolated, seizure disorders often result from multiple interacting genetic and environmental factors and follow a multifactorial pattern of inheritance. Given there is thought to be a genetic component and this is a 2ne degree relative to the pregnancy, the risk may be increased, however there is no prenatal test to determine if a fetus will develop a seizure disorder.  Nicholas's mother had 9 children and had one still birth.     Otherwise, the reported family history is unremarkable for multiple miscarriages, birth defects, intellectual disabilities, known genetic conditions, and consanguinity.       RISK ASSESSMENT FOR CHROMOSOME CONDITIONS   We explained that the risk for fetal chromosome abnormalities increases with maternal age. We discussed specific features of common chromosome abnormalities, including Down syndrome, trisomy 13, trisomy 18, and sex chromosome trisomies.    At age 35 at midtrimester, the risk to have a baby with Down syndrome is 1 in 342.   At age 35 at midtrimester, the risk to have a baby with any chromosome abnormality is 1 in  172.     Rakel  is interested in doing NIPT but is too early today. She plans to follow up with her primary care provider to have NIPT completed.       RISK ASSESSMENT FOR INHERITED CONDITIONS   We discussed that every pregnancy has a chance to have an inherited single-gene condition, even when there is no family history of that condition. In fact, approximately 90% of couples at an increased reproductive risk for an inherited condition have no family history of that  condition. The average person may be a carrier for 5-10 different genetic variants that can increase the chance for their pregnancies to have that condition. We discussed autosomal recessive conditions and X-linked conditions. Autosomal recessive conditions happen when a mutation has been inherited from the egg and sperm and include conditions like cystic fibrosis, thalassemia, hearing loss, spinal muscular atrophy, and more. X-linked conditions happen when a mutation has been inherited from the egg and include conditions like fragile X syndrome.     We reviewed that when both biological parents carry a harmful genetic change in a gene associated with autosomal recessive inheritance, each of their pregnancies has a 1 in 4 (25%) chance to be affected by that condition. With x-linked conditions, the specific risk generally depends on the chromosomal sex of the fetus, with XY individuals (generally male) being most severely affected.     [place] was [place]. [place]  Scre[place]     The patient has not had carrier screening previously. The patient and their partner pursued carrier screening today. The screening will include 267 conditions (Universal Plus panel) through [employer]. .  See below for the more detailed information we discussed. See separate documentation in partners chart.     We discussed that expanded carrier screening is designed to identify carrier status for conditions that are primarily childhood or adolescent onset. Expanded carrier screening does not evaluate for adult-onset conditions such as hereditary cancer syndromes, dementia/ Alzheimer's disease, or cardiovascular disease risk factors. Additionally, expanded carrier screening is not comprehensive for all known genetic diseases or inherited conditions. It will not intentionally screen for autosomal dominant conditions. This is a screening test, and residual carrier status risk figures will be provided to the patient  after results become available. Carrier screening is not meant to diagnose the patient with a condition, and generally carriers are asymptomatic. However, certain genes may confer increased risks for various health concerns in carriers (including, but not limited to: DENI, DMD, FMR1).    We discussed that carrier screening can have implications for other family members and that couples are encouraged to share positive results with siblings and other family members of reproductive age. Additionally, even if there is not a high reproductive risk for a condition, it is possible that carrier status can be passed on to future generations.    We reviewed that there is a law in place, the Genetic Information Nondiscrimination Act (EPIFANIO), that protects patients from discrimination by health insurance companies and employers based on their genetic information. [place] does not protect against discrimination by life insurance companies or disability insurance.    We reviewed that carrier screening will report on variants classified by the lab as pathogenic or likely pathogenic. Although carrier status does not change over time, it is possible that a variant could be reclassified as more information about the variant is learned. If this occurs, the couple will be contacted and a new risk assessment will be provided.     GENETIC TESTING OPTIONS   Genetic testing during a pregnancy includes screening and diagnostic procedures.      Screening tests are non-invasive which means no risk to the pregnancy and includes ultrasounds and blood work. The benefits and limitations of screening were reviewed. Screening tests provide a risk assessment (chance) specific to the pregnancy for certain fetal chromosome abnormalities but cannot definitively diagnose or exclude a fetal chromosome abnormality. Follow-up genetic counseling and consideration of diagnostic testing is recommended with any abnormal screening result. Diagnostic testing during  a pregnancy is more certain and can test for more conditions. However, the tests do have a risk of miscarriage that requires careful consideration. These tests can detect fetal chromosome abnormalities with greater than 99% certainty. Results can be compromised by maternal cell contamination or mosaicism and are limited by the resolution of current genetic testing technology.     There is no screening or diagnostic test that detects all forms of birth defects or intellectual disability.     We discussed the following screening options:   Non-invasive prenatal testing (NIPT)  Also called cell-free DNA screening because it detect chromosome fragments from the placenta in the pregnant person's blood.  Can be done any time after 10 weeks gestation.  Screens for trisomy 21, trisomy 18, trisomy 13, and sex chromosome aneuploidies. ACMG also recommends consideration of screening for 22q11.2 microdeletion syndrome.  Does not screen for all known chromosomal conditions.  Even with negative results, a residual risk for screened conditions remains.  Cannot screen for open neural tube defects, maternal serum AFP after 15 weeks is recommended    Carrier screening  Risk assessment for certain autosomal recessive and x-linked conditions. These conditions are generally infantile- or childhood-onset conditions.   Can be done any time during the pregnancy or prior to pregnancy.  Can screen for over 500 different genetic conditions.  Is not intended to diagnosis a condition in the carrier parent.    Even with negative results, a residual risk for screened conditions remains.      We discussed the following ultrasound options:  Comprehensive level II ultrasound (Fetal Anatomy Ultrasound)  Ultrasound done between 18-20 weeks gestation  Screens for major birth defects and markers for aneuploidy (like trisomy 21 and trisomy 18)  Includes looking at the fetus/baby's growth, heart, organs (stomach, kidneys), placenta, and amniotic  fluid    We discussed the following diagnostic options:   Chorionic villus sampling (CVS)  Invasive diagnostic procedure done between 10w0d and 13w6d  The procedure collects a small sample from the placenta for the purpose of chromosomal testing and/or other genetic testing  Diagnostic result; more than 99% sensitivity for fetal chromosome abnormalities  Cannot screen for open neural tube defects, maternal serum AFP after 15 weeks is recommended  Amniocentesis  Invasive diagnostic procedure done after 15 weeks gestation  The procedure collects a small sample of amniotic fluid for the purpose of chromosomal testing and/or other genetic testing  Diagnostic result; more than 99% sensitivity for fetal chromosome abnormalities  Testing for AFP in the amniotic fluid can test for open neural tube defects     It was a pleasure to be involved with [facility] care. Time spent on the day of encounter was  50  minutes.    LELE RASHID MS, [facility]  Genetic Counselor  [facility]  Maternal Fetal Medicine  Office: [phone]   [facility]: [phone]   Fax: [phone]  [facility]

## 2025-01-29 ENCOUNTER — VIRTUAL VISIT (OUTPATIENT)
Dept: OBGYN | Facility: CLINIC | Age: 35
End: 2025-01-29
Payer: COMMERCIAL

## 2025-01-29 DIAGNOSIS — O36.80X0 PREGNANCY WITH INCONCLUSIVE FETAL VIABILITY: ICD-10-CM

## 2025-01-29 DIAGNOSIS — O09.91 SUPERVISION OF HIGH RISK PREGNANCY IN FIRST TRIMESTER: Primary | ICD-10-CM

## 2025-01-29 DIAGNOSIS — Z13.79 GENETIC SCREENING: ICD-10-CM

## 2025-01-29 PROBLEM — O09.90 SUPERVISION OF HIGH-RISK PREGNANCY: Status: ACTIVE | Noted: 2025-01-29

## 2025-01-29 PROCEDURE — 99207 PR NO CHARGE NURSE ONLY: CPT | Mod: 93

## 2025-01-29 NOTE — PATIENT INSTRUCTIONS
Learning About Pregnancy  Your Care Instructions     Your health in the early weeks of your pregnancy is particularly important for your baby's health. Take good care of yourself. Anything you do that harms your body can also harm your baby.  Make sure to go to all of your doctor appointments. Regular checkups will help keep you and your baby healthy.  How can you care for yourself at home?  Diet    Choose healthy foods like fruits, vegetables, whole grains, lean proteins, and healthy fats.     Choose foods that are good sources of calcium, iron, and folate. You can try dairy products, dark leafy greens, fortified orange juice and cereals, almonds, broccoli, dried fruit, and beans.     Do not skip meals or go for many hours without eating. If you are nauseated, try to eat a small, healthy snack every 2 to 3 hours.     Avoid fish that are high in mercury. These include shark, swordfish, tesha mackerel, marlin, orange roughy, and bigeye tuna, as well as tilefish from the [facility].     It's okay to eat up to 8 to 12 ounces a week of fish that are low in mercury or up to 4 ounces a week of fish that have medium levels of mercury. Some fish that are low in mercury are salmon, shrimp, canned light tuna, cod, and tilapia. Some fish that have medium levels of mercury are halibut and white albacore tuna.     Drink plenty of fluids. If you have kidney, heart, or liver disease and have to limit fluids, talk with your doctor before you increase the amount of fluids you drink.     Limit caffeine to about 200 to 300 mg per day. On average, a cup of brewed coffee has around 80 to 100 mg of caffeine.     Do not drink alcohol, such as beer, wine, or hard liquor.     Take a multivitamin that contains at least 400 micrograms (mcg) of folic acid to help prevent birth defects. Fortified cereal and whole wheat bread are good additional sources of folic acid.     Increase the calcium in your diet. Try to drink a quart of skim milk  each day. You may also take calcium supplements and choose foods such as cheese and yogurt.   Lifestyle    Make sure you go to your follow-up appointments.     Get plenty of rest. You may be unusually tired while you are pregnant.     Get at least 30 minutes of exercise on most days of the week. Walking is a good choice. If you have not exercised in the past, start out slowly. Take several short walks each day.     Do not smoke. If you need help quitting, talk to your doctor about stop-smoking programs. These can increase your chances of quitting for good.     Do not touch cat feces or litter boxes. Also, wash your hands after you handle raw meat, and fully cook all meat before you eat it. Wear gloves when you work in the yard or garden, and wash your hands well when you are done. Cat feces, raw or undercooked meat, and contaminated dirt can cause an infection that may harm your baby or lead to a miscarriage.     Avoid things that can make your body too hot and may be harmful to your baby, such as a hot tub or sauna. Or talk with your doctor before doing anything that raises your body temperature. Your doctor can tell you if it's safe.     Avoid chemical fumes, paint fumes, or poisons.     Do not use illegal drugs, marijuana, or alcohol.   Medicines    Review all of your medicines with your doctor. Some of your routine medicines may need to be changed to protect your baby.     Use acetaminophen (Tylenol) to relieve minor problems, such as a mild headache or backache or a mild fever with cold symptoms. Do not use nonsteroidal anti-inflammatory drugs (NSAIDs), such as ibuprofen (Advil, Motrin) or naproxen (Aleve), unless your doctor says it is okay.     Do not take two or more pain medicines at the same time unless the doctor told you to. Many pain medicines have acetaminophen, which is Tylenol. Too much acetaminophen (Tylenol) can be harmful.     Take your medicines exactly as prescribed. Call your doctor if you  "think you are having a problem with your medicine.   To manage morning sickness    Keep food in your stomach, but not too much at once. Try eating five or six small meals a day instead of three large meals.     For nausea when you wake up, eat a small snack, such as a couple of crackers or pretzels, before rising. Allow a few minutes for your stomach to settle before you slowly get up.     Try to avoid smells and foods that make you feel nauseated. High-fat or greasy foods, milk, and coffee may make nausea worse. Some foods that may be easier to tolerate include cold, spicy, sour, and salty foods.     Drink enough fluids. Water and other caffeine-free drinks are good choices.     Take your prenatal vitamins at night on a full stomach.     Try foods and drinks made with ledy. Ledy may help with nausea.     Get lots of rest. Morning sickness may be worse when you are tired.     Talk to your doctor about over-the-counter products, such as vitamin B6 or doxylamine, to help relieve symptoms.     Try a P6 acupressure wrist band. These anti-nausea wristbands help some people.   Follow-up care is a key part of your treatment and safety. Be sure to make and go to all appointments, and call your doctor if you are having problems. It's also a good idea to know your test results and keep a list of the medicines you take.  Where can you learn more?  Go to https://www.TRIBAX.net/patiented  Enter E868 in the search box to learn more about \"Learning About Pregnancy.\"  Current as of: April 30, 2024  Content Version: 14.3    2024 [employer].   Care instructions adapted under license by your healthcare professional. If you have questions about a medical condition or this instruction, always ask your healthcare professional. [employer] disclaims any warranty or liability for your use of this information.    Weeks 6 to 10 of Your Pregnancy: Care Instructions  During these weeks of pregnancy, your body " "goes through many changes. You may start to feel different, both in your body and your emotions. Each pregnancy is different, so there's no \"right\" way to feel. These early weeks are a time to make healthy choices for you and your pregnancy.    Take a daily prenatal vitamin. Choose one with folic acid in it.   Avoid alcohol, tobacco, and drugs (including marijuana). If you need help quitting, talk to your doctor.     Drink plenty of liquids.  Be sure to drink enough water. And limit sodas, other sweetened drinks, and caffeine.     Choose foods that are good sources of calcium, iron, and folate.  You can try dairy products, dark leafy greens, fortified orange juice and cereals, almonds, broccoli, dried fruit, and beans.     Avoid foods that may be harmful.  Don't eat raw meat, deli meat, raw seafood, or raw eggs. Avoid soft cheese and unpasteurized dairy, like Brie and blue cheese. And don't eat fish that contains a lot of mercury, like shark and swordfish.     Don't touch zia litter or cat poop.  They can cause an infection that could be harmful during pregnancy.     Avoid things that can make your body too hot.  For example, avoid hot tubs and saunas.     Soothe morning sickness.  Try eating 5 or 6 small meals a day, getting some fresh air, or using chandni to control symptoms.     Ask your doctor about flu and COVID-19 shots.  Getting them can help protect against infection.   Follow-up care is a key part of your treatment and safety. Be sure to make and go to all appointments, and call your doctor if you are having problems. It's also a good idea to know your test results and keep a list of the medicines you take.  Where can you learn more?  Go to https://www.[employer].net/patiented  Enter G112 in the search box to learn more about \"Weeks 6 to 10 of Your Pregnancy: Care Instructions.\"  Current as of: April 30, 2024  Content Version: 14.3    2024 [facility] [employer].   Care instructions adapted under license " by your healthcare professional. If you have questions about a medical condition or this instruction, always ask your healthcare professional. [employer] disclaims any warranty or liability for your use of this information.       Pregnancy: Managing Morning Sickness (01:48)  Your health professional recommends that you watch this short online health video.  Learn how to manage morning sickness during pregnancy.   Purpose: Learn how to manage morning sickness during pregnancy.  Goal: Learn how to manage morning sickness during pregnancy.    Watch: Scan the QR code or visit the link to view video       [email or web address]  Current as of: April 30, 2024  Content Version: 14.3    2024 [employer].   Care instructions adapted under license by your healthcare professional. If you have questions about a medical condition or this instruction, always ask your healthcare professional. [employer] disclaims any warranty or liability for your use of this information.    Pregnancy and Heartburn: Care Instructions  Overview     Heartburn is a common problem during pregnancy.  Heartburn happens when stomach acid backs up into the tube that carries food to the stomach. This tube is called the esophagus. Early in pregnancy, heartburn is caused by hormone changes that slow down digestion. Later on, it's also caused by the large uterus pushing up on the stomach.  Even though you can't fix the cause, there are things you can do to get relief. Treating heartburn during pregnancy focuses first on making lifestyle changes, like changing what and how you eat, and on taking medicines.  Heartburn usually improves or goes away after childbirth.  Follow-up care is a key part of your treatment and safety. Be sure to make and go to all appointments, and call your doctor if you are having problems. It's also a good idea to know your test results and keep a list of the medicines you take.  How can you  "care for yourself at home?  Eat small, frequent meals.  Avoid foods that make your symptoms worse, such as chocolate, peppermint, and spicy foods. Avoid drinks with caffeine, such as coffee, tea, and sodas.  Avoid bending over or lying down after meals.  Take a short walk after you eat.  If heartburn is a problem at night, do not eat for 2 hours before bedtime.  Take antacids like Mylanta, Maalox, Rolaids, or Tums. Do not take antacids that have sodium bicarbonate, magnesium trisilicate, or aspirin. Be careful when you take over-the-counter antacid medicines. Many of these medicines have aspirin in them. While you are pregnant, do not take aspirin or medicines that contain aspirin unless your doctor says it is okay.  If you're not getting relief, talk to your doctor. You may be able to take a stronger acid-reducing medicine.  When should you call for help?   Call your doctor now or seek immediate medical care if:    You have new or worse belly pain.     You are vomiting.   Watch closely for changes in your health, and be sure to contact your doctor if:    You have new or worse symptoms of reflux.     You are losing weight.     You have trouble or pain swallowing.     You do not get better as expected.   Where can you learn more?  Go to https://www.tuta.co.net/patiented  Enter U946 in the search box to learn more about \"Pregnancy and Heartburn: Care Instructions.\"  Current as of: April 30, 2024  Content Version: 14.3    2024 [employer].   Care instructions adapted under license by your healthcare professional. If you have questions about a medical condition or this instruction, always ask your healthcare professional. [employer] disclaims any warranty or liability for your use of this information.    Constipation: Care Instructions  Overview     Constipation means that you have a hard time passing stools (bowel movements). People pass stools from 3 times a day to once every 3 days. What is " normal for you may be different. Constipation may occur with pain in the rectum and cramping. The pain may get worse when you try to pass stools. Sometimes there are small amounts of bright red blood on toilet paper or the surface of stools. This is because of enlarged veins near the rectum (hemorrhoids).  A few changes in your diet and lifestyle may help you avoid ongoing constipation. Your doctor may also prescribe medicine to help loosen your stool.  Some medicines can cause constipation. These include pain medicines and antidepressants. Tell your doctor about all the medicines you take. Your doctor may want to make a medicine change to ease your symptoms.  Follow-up care is a key part of your treatment and safety. Be sure to make and go to all appointments, and call your doctor if you are having problems. It's also a good idea to know your test results and keep a list of the medicines you take.  How can you care for yourself at home?  Drink plenty of fluids. If you have kidney, heart, or liver disease and have to limit fluids, talk with your doctor before you increase the amount of fluids you drink.  Include high-fiber foods in your diet each day. These include fruits, vegetables, beans, and whole grains.  Get at least 30 minutes of exercise on most days of the week. Walking is a good choice. You also may want to do other activities, such as running, swimming, cycling, or playing tennis or team sports.  Take a fiber supplement, such as Citrucel or Metamucil, every day. Read and follow all instructions on the label.  Schedule time each day for a bowel movement. A daily routine may help. Take your time having a bowel movement, but don't sit for more than 10 minutes at a time. And don't strain too much.  Support your feet with a small step stool when you sit on the toilet. This helps flex your hips and places your pelvis in a squatting position.  Your doctor may recommend an over-the-counter laxative to relieve  "your constipation. Examples are Milk of Magnesia and MiraLax. Read and follow all instructions on the label. Do not use laxatives on a long-term basis.  When should you call for help?   Call your doctor now or seek immediate medical care if:    You have new or worse belly pain.     You have new or worse nausea or vomiting.     You have blood in your stools.   Watch closely for changes in your health, and be sure to contact your doctor if:    Your constipation is getting worse.     You do not get better as expected.   Where can you learn more?  Go to https://www.MyCrowd.net/patiented  Enter P343 in the search box to learn more about \"Constipation: Care Instructions.\"  Current as of: October 19, 2023  Content Version: 14.3    2024 [employer].   Care instructions adapted under license by your healthcare professional. If you have questions about a medical condition or this instruction, always ask your healthcare professional. [employer] disclaims any warranty or liability for your use of this information.    Learning About Rh Immunoglobulin Shots  Introduction     An Rh immunoglobulin shot is given to pregnant women who have Rh-negative blood.  You may have Rh-negative blood, and your baby may have Rh-positive blood. If the two types of blood mix, your body will make antibodies. This is called Rh sensitization. Most of the time, this is not a problem the first time you're pregnant. But it could cause problems in future pregnancies.  This shot keeps your body from making the antibodies. You get the shot around 28 weeks of pregnancy. After the birth, your baby's blood is tested. If the blood is Rh positive, you will get another shot. You may also get the shot if you have vaginal bleeding while you are pregnant or if you have a miscarriage. These shots protect future pregnancies.  Women with Rh negative blood will need this shot each time they get pregnant.  Example  Rh immunoglobulin (HypRho-D, " "MICRhoGAM, and RhoGAM)  Possible side effects  Rare side effects may include:  Some mild pain where you got the shot.  A slight fever.  An allergic reaction.  You may have other side effects not listed here. Check the information that comes with your medicine.  What to know about taking this medicine  You may need more than one shot. You may need the shot again:  After amniocentesis, fetal blood sampling, or chorionic villus sampling tests.  If you have bleeding in your second or third trimester.  After turning of a breech baby.  After an injury to the belly while you are pregnant.  After a miscarriage or an .  Before or right after treatment for an ectopic or a partial molar pregnancy.  Tell your doctor if you have any allergies or have had a bad response to medicines in the past.  If you get this shot within 3 months of getting a live-virus vaccine, the vaccine may not work. Your doctor will tell you if you need more vaccine.  Check with your doctor or pharmacist before you use any other medicines. This includes over-the-counter medicines. Make sure your doctor knows all of the medicines, vitamins, herbs, and supplements you take. Taking some medicines at the same time can cause problems.  Where can you learn more?  Go to https://www.EatStreet.net/patiented  Enter V615 in the search box to learn more about \"Learning About Rh Immunoglobulin Shots.\"  Current as of: 2024  Content Version: 14.3    2024 [employer].   Care instructions adapted under license by your healthcare professional. If you have questions about a medical condition or this instruction, always ask your healthcare professional. [employer] disclaims any warranty or liability for your use of this information.    Rubella (Central African Measles): Care Instructions  Overview  Rubella, also called [place] measles or 3-day measles, is a disease caused by a virus. It spreads by coughs, sneezes, and close contact. Rubella " usually is mild and does not cause long-term problems. But if you are pregnant and get it, you can give the disease to your unborn baby. This can cause serious birth defects.  While you have rubella, you may get a rash and a mild fever, and the lymph glands in your neck may swell. Older children often have a fever, eye pain, a sore throat, and body aches. You can relieve most symptoms with care at home. Avoid being around others, especially pregnant people, until your rash has been gone for at least 4 days. People who have not had this disease before or have not had the vaccine have the greatest chance of getting the virus.  Follow-up care is a key part of your treatment and safety. Be sure to make and go to all appointments, and call your doctor if you are having problems. It's also a good idea to know your test results and keep a list of the medicines you take.  How can you care for yourself at home?  Drink plenty of fluids. If you have kidney, heart, or liver disease and have to limit fluids, talk with your doctor before you increase the amount of fluids you drink.  Get plenty of rest to help your body heal.  Take an over-the-counter pain medicine, such as acetaminophen (Tylenol), ibuprofen (Advil, Motrin), or naproxen (Aleve), to reduce fever and discomfort. Read and follow all instructions on the label. Do not give aspirin to anyone younger than 20. It has been linked to Reye syndrome, a serious illness.  Do not take two or more pain medicines at the same time unless the doctor told you to. Many pain medicines have acetaminophen, which is Tylenol. Too much acetaminophen (Tylenol) can be harmful.  Try not to scratch the rash. Put cold, wet cloths on the rash to reduce itching.  Do not smoke. Smoking can make your symptoms worse. If you need help quitting, talk to your doctor about stop-smoking programs and medicines. These can increase your chances of quitting for good.  Avoid contact with people who have never  "had rubella and who have not been immunized.  When should you call for help?   Call your doctor now or seek immediate medical care if:    You have a fever with a stiff neck or a severe headache.     You are sensitive to light or feel very sleepy or confused.   Watch closely for changes in your health, and be sure to contact your doctor if:    You do not get better as expected.   Where can you learn more?  Go to https://www.[employer].net/patiented  Enter B812 in the search box to learn more about \"Rubella (Italian Measles): Care Instructions.\"  Current as of: 2024  Content Version: 14.3    2024 [employer].   Care instructions adapted under license by your healthcare professional. If you have questions about a medical condition or this instruction, always ask your healthcare professional. [employer] disclaims any warranty or liability for your use of this information.    Gonorrhea and Chlamydia: About These Tests  What is it?  These tests use a sample of urine or other body fluid to look for the bacteria that cause these sexually transmitted infections (STIs). The fluid sample can come from the cervix, vagina, rectum, throat, or eyes.  Why is this test done?  These tests may be done to:  Find out if symptoms are caused by gonorrhea or chlamydia.  Check people who are at high risk of being infected with gonorrhea or chlamydia.  Retest people several months after they have been treated for gonorrhea or chlamydia.  Check for infection in your  if you had a gonorrhea or chlamydia infection at the time of delivery.  How can you prepare for the test?  If you are going to have a urine test, do not urinate for at least 1 hour before the test.  If you think you may have chlamydia or gonorrhea, don't have sexual intercourse until you get your test results. And you may want to have tests for other STIs, such as HIV.  How is the test done?  For a direct sample, a swab is used to collect " "body fluid from the cervix, vagina, rectum, throat, or eyes. Your doctor may collect the sample. Or you may be given instructions on how to collect your own sample.  For a urine sample, you will collect the urine that comes out when you first start to urinate. Don't wipe the genital area clean before you urinate.  How long does the test take?  The test will take a few minutes.  What happens after the test?  You will be able to go home right away.  You can go back to your usual activities right away.  If you do have an infection, don't have sexual intercourse for 7 days after you start treatment. And your sex partner(s) should also be treated.  Follow-up care is a key part of your treatment and safety. Be sure to make and go to all appointments, and call your doctor if you are having problems. It's also a good idea to keep a list of the medicines you take. Ask your doctor when you can expect to have your test results.  Where can you learn more?  Go to https://www.StreetInvestor.net/patiented  Enter K976 in the search box to learn more about \"Gonorrhea and Chlamydia: About These Tests.\"  Current as of: April 30, 2024  Content Version: 14.3    2024 [employer].   Care instructions adapted under license by your healthcare professional. If you have questions about a medical condition or this instruction, always ask your healthcare professional. [employer] disclaims any warranty or liability for your use of this information.    Trichomoniasis: About This Test  What is it?     This test uses a sample of urine or other body fluid to look for the tiny parasite that causes trichomoniasis (also called trich). The fluid sample can come from the vagina, cervix, or urethra. Your doctor may choose to use one or more of many available tests.  Why is it done?  A trich test may be done to:  Find out if symptoms are caused by trich.  Check people who are at high risk for being infected with trich.  Check after " treatment to make sure that the infection is gone.  How do you prepare for the test?  If you are going to have a urine test, do not urinate for at least 1 hour before the test.  How is the test done?  For a direct sample, a swab is used to collect body fluid from the cervix, vagina, or urethra. Your doctor may collect the sample. Or you may be given instructions on how to collect your own sample.  For a urine sample, you will collect the urine that comes out when you first start to urinate. Don't wipe the area clean before you urinate.  How long does the test take?  It will take a few minutes to collect a sample.  What happens after the test?  You can go home right away.  You can go back to your usual activities right away.  You may get the test results the same day or several days later. It depends on the test used.  If you do have an infection, don't have sexual intercourse for 7 days after you start treatment. Your sex partner or partners should also be treated.  Follow-up care is a key part of your treatment and safety. Be sure to make and go to all appointments, and call your doctor if you are having problems. Ask your doctor when you can expect to have your test results.  Current as of: April 30, 2024  Content Version: 14.3    2024 [employer].   Care instructions adapted under license by your healthcare professional. If you have questions about a medical condition or this instruction, always ask your healthcare professional. [employer] disclaims any warranty or liability for your use of this information.    HIV Testing: Care Instructions  Overview  You can get tested for the human immunodeficiency virus (HIV). Most doctors use a blood test to check for HIV antibodies and antigens in your blood. It may also check for the genetic material (RNA) of HIV. Some tests use saliva to check for HIV antibodies. But these aren't as accurate. For example, they may give a false result if you've just  "been infected.  What do the results mean?        Normal (negative)   No HIV antibodies, antigens, or RNA were found.  You may need more testing. It can make sure your test results are correct.        Uncertain (indeterminate)   Test results didn't clearly show if you have an HIV infection.  HIV antibodies or antigens may not have formed yet.  Some other type of antibody or antigen may have affected the results.  You will need another test to be sure.        Abnormal (positive)   HIV antibodies, antigens, or RNA were found.  If you haven't had an RNA test yet, one will be done. If it's positive, you have HIV.  If your test result is positive, your doctor will talk to you. You will discuss starting treatment.  Follow-up care is a key part of your treatment and safety. Be sure to make and go to all appointments, and call your doctor if you are having problems. It's also a good idea to know your test results and keep a list of the medicines you take.  Where can you learn more?  Go to https://www.[employer]uu.net/patiented  Enter T792 in the search box to learn more about \"HIV Testing: Care Instructions.\"  Current as of: April 30, 2024  Content Version: 14.3    2024 [employer].   Care instructions adapted under license by your healthcare professional. If you have questions about a medical condition or this instruction, always ask your healthcare professional. [employer] disclaims any warranty or liability for your use of this information.    Hepatitis C Virus Tests: About These Tests  What are they?     Hepatitis C virus tests are blood tests that check for substances in the blood that show whether you have hepatitis C now or had it in the past. The tests can also tell you what type of hepatitis C you have and how severe the disease is. This can help your doctor with treatment.  If the tests show that you have long-term hepatitis C, you need to take steps to prevent spreading the disease.  Why are " "these tests done?  You may need these tests if:  You have symptoms of hepatitis.  You may have been exposed to the virus. You are more likely to have been exposed to the virus if you inject drugs or are exposed to body fluids (such as if you are a health care worker).  You've had other tests that show you have liver problems.  You are 18 to 79 years old.  You have an HIV infection.  The tests also are done to help your doctor decide about your treatment and see how well it works.  How do you prepare for the test?  In general, there's nothing you have to do before this test, unless your doctor tells you to.  How is the test done?  A health professional uses a needle to take a blood sample, usually from the arm.  What happens after these tests?  You will probably be able to go home right away.  You can go back to your usual activities right away.  Follow-up care is a key part of your treatment and safety. Be sure to make and go to all appointments, and call your doctor if you are having problems. It's also a good idea to keep a list of the medicines you take. Ask your doctor when you can expect to have your test results.  Where can you learn more?  Go to https://www.[employer].net/patiented  Enter W551 in the search box to learn more about \"Hepatitis C Virus Tests: About These Tests.\"  Current as of: April 30, 2024  Content Version: 14.3    2024 [employer].   Care instructions adapted under license by your healthcare professional. If you have questions about a medical condition or this instruction, always ask your healthcare professional. [employer] disclaims any warranty or liability for your use of this information.    Learning About Fetal Ultrasound Results  What is a fetal ultrasound?     Fetal ultrasound is a test that lets your doctor see an image of your baby. Your doctor learns information about your baby from this picture. You may find out, for example, if you are having a boy or a " girl. But the main reason you have this test is to get information about your baby's health.  (You may hear your baby called a fetus. This is a common medical term for a baby that's growing in the mother's uterus.)  What kind of information can you learn from this test?  The findings of an ultrasound fall into two categories, normal and abnormal.  Normal  The fetus is the right size for its age.  The placenta is the expected size and does not cover the cervix.  There is enough amniotic fluid in the uterus.  No birth defects can be seen.  Abnormal  The fetus is small or large for its age.  The placenta covers the cervix.  There is too much or too little amniotic fluid in the uterus.  The fetus may have a birth defect.  What does an abnormal result mean?  Abnormal seems to imply that something is wrong with your baby. But what it means is that the test has shown something the doctor wants to take a closer look at.  And that's what happens next. Your doctor will talk to you about what further test or tests you may need.  What do the results mean?  Some of the things your doctor may see on an abnormal ultrasound include:  Echogenic bowel.  The bowel looks very bright on the screen. This could mean that there's blood in the bowel. Or it could mean that something is blocking the small bowel.  Increased nuchal translucency.  The ultrasound measures the thickness at the back of the baby's neck. An increase in thickness is sometimes an early sign of Down syndrome.  Increased or decreased amniotic fluid.  The doctor will look for a reason for the level of amniotic fluid and will watch the pregnancy closely as it progresses.  Large ventricles.  Ventricles in the brain look larger than they should. Your doctor may take a closer look at the brain.  Renal pyelectasis/hydronephrosis.  The ultrasound measures the fluid around the kidney. If there is more fluid than expected, there is a chance of urinary tract or kidney  "problems.  Short long bones.  The ultrasound measures certain arm and leg bones. A long bone (humerus or femur) that is shorter than average could be a sign of Down syndrome.  Subchorionic hemorrhage.  An ultrasound can show bleeding under one of the membranes that surrounds the fetus. Some women don't have symptoms of bleeding. The ultrasound can find this problem when women are not bleeding from their vagina. Women who have this condition have a slightly higher chance of miscarriage.  What do you do now?  Take a deep breath, and let it out. Keep in mind that an abnormal finding on an ultrasound, after it's coupled with more information, may:  Turn out to be nothing.  Turn out to be something mild that won't affect the baby.  Turn out to be something more serious. But if this happens, early diagnosis helps you and your doctor plan treatment options sooner rather than later.  Your medical team is there for you. So are your family and friends. Ask questions, and get the help and support you need.  Follow-up care is a key part of your treatment and safety. Be sure to make and go to all appointments, and call your doctor if you are having problems. It's also a good idea to know your test results and keep a list of the medicines you take.  Where can you learn more?  Go to https://www.[employer].net/patiented  Enter K451 in the search box to learn more about \"Learning About Fetal Ultrasound Results.\"  Current as of: April 30, 2024  Content Version: 14.3    2024 [employer].   Care instructions adapted under license by your healthcare professional. If you have questions about a medical condition or this instruction, always ask your healthcare professional. [employer] disclaims any warranty or liability for your use of this information.    Learning About Prenatal Visits  Overview     Regular prenatal visits are very important during any pregnancy. These quick office visits may seem simple and " routine. But they can help you have a safe and healthy pregnancy. Your doctor is watching for problems that can only be found through regular checkups. The visits also give you and your doctor time to build a good relationship.  After your first visit, you will most likely start on a schedule of monthly visits. In your third trimester, the visits will get more frequent. Based on your health, your age, and if you've had a normal, full-term pregnancy before, your doctor may want to see you more or less often.  At different times in your pregnancy, you will have exams and tests. Some are routine. Others are done only when there is a chance of a problem. Everything healthy you do for your body helps you have a healthy pregnancy. Rest when you need it. Eat well, drink plenty of water, and exercise regularly.  What happens during a prenatal visit?  You will have blood pressure checks, along with urine tests. You also may have blood tests. If you need to go to the bathroom while waiting for the doctor, tell the nurse. You will be given a sample cup so your urine can be tested.  You will be weighed and have your belly measured.  Your doctor may listen to the fetal heartbeat with a special device.  At about 24 weeks, and possibly earlier in your pregnancy, your doctor will check your blood sugar (glucose tolerance test) for diabetes that can occur during pregnancy. This is gestational diabetes, which can be harmful.  You will have tests to check for infections that could harm your . These include group B streptococcus and hepatitis B.  Your doctor may do ultrasounds to check for problems. This also checks the position of the fetus. An ultrasound uses sound waves to produce a picture of the fetus.  You may get your vaccines updated.  Your doctor may ask you questions to check for signs of anxiety or depression. Tell your doctor if you feel sad, anxious, or hopeless for more than a few days.  You may have other tests at  "any time during your pregnancy.  Use your visits to discuss with your doctor any concerns you have.  How can you care for yourself at home?  Get plenty of rest.  Try to exercise every day, if your doctor says it is okay. If you have not exercised in the past, start out slowly. For example, you can take short walks each day.  Choose healthy foods, such as fruits, vegetables, whole grains, lean proteins, low-fat dairy, and healthy fats.  Drink plenty of fluids. Cut down on drinks with caffeine, such as coffee, tea, and cola. If you have kidney, heart, or liver disease and have to limit fluids, talk with your doctor before you increase the amount of fluids you drink.  Try to avoid chemical fumes, paint fumes, and poisons.  If you smoke, vape, or use alcohol, marijuana, or other drugs, quit or cut back as much as you can. Talk to your doctor if you need help quitting.  Review all of your medicines, including over-the-counter medicines and supplements, with your doctor. Some of your routine medicines may need to be changed. Do not stop or start taking any medicines without talking to your doctor first.  Follow-up care is a key part of your treatment and safety. Be sure to make and go to all appointments, and call your doctor if you are having problems. It's also a good idea to know your test results and keep a list of the medicines you take.  Where can you learn more?  Go to https://www.[employer].net/patiented  Enter J502 in the search box to learn more about \"Learning About Prenatal Visits.\"  Current as of: April 30, 2024  Content Version: 14.3    2024 [employer].   Care instructions adapted under license by your healthcare professional. If you have questions about a medical condition or this instruction, always ask your healthcare professional. [employer] disclaims any warranty or liability for your use of this information.    Intimate Partner Violence: Care Instructions  Overview     If you " want to save this information but don't think it is safe to take it home, see if a trusted friend can keep it for you. Plan ahead. Know who you can call for help, and memorize the phone number.   Be careful online too. Your online activity may be seen by others. Do not use your personal computer or device to read about this topic. Use a safe computer, such as one at work, a friend's home, or a library.    Intimate partner violence--a type of domestic abuse--is different from an argument now and then. It is a pattern of abuse that one person may use to control another person's behavior. It may start with threats and name-calling. Then, it may lead to more serious acts, like pushing and slapping. The abuse also may occur in other areas. For example, the abuser may withhold money or spend a partner's money without their knowledge.  Abuse can cause serious harm. You are more likely to have a long-term health problem from the injuries and stress of living in a violent relationship. People who are sexually abused by their partners have more sexually transmitted infections and unplanned pregnancies. Anyone who is abused also faces emotional pain. Anyone can be abused in relationships. In some relationships, both people use abusive behavior.  If you are pregnant, abuse can cause problems such as poor weight gain, infections, and bleeding. Abuse during this time may increase your baby's risk of low birth weight, premature birth, and death.  Follow-up care is a key part of your treatment and safety. Be sure to make and go to all appointments, and call your doctor if you are having problems. It's also a good idea to know your test results and keep a list of the medicines you take.  How can you care for yourself at home?  If you do not have a safe place to stay, discuss this with your doctor before you leave.  Have a plan for where to go, how to leave your home, and where to stay in case of an emergency. Do not tell your  "partner about your plan. Contact:  The National Domestic Violence Hotline toll-free at 1-661.615.3539. They can help you find resources in your area.  Your local police department, hospital, or clinic for information about shelters and safe homes near you.  Talk to a trusted friend or neighbor, a counselor, or a vance leader. Do not feel that you have to hide what happened.  Teach your children how to call for help in an emergency.  Be alert to warning signs, such as threats, heavy alcohol use, or drug use. This can help you avoid danger.  If you can, make sure that there are no guns or other weapons in your home.  When should you call for help?   Call 911 anytime you think you may need emergency care. For example, call if:    You or someone else has just been abused.     You think you or someone else is in danger of being abused.   Watch closely for changes in your health, and be sure to contact your doctor if you have any problems.  Where can you learn more?  Go to https://www.[employer].net/patiented  Enter G282 in the search box to learn more about \"Intimate Partner Violence: Care Instructions.\"  Current as of: July 31, 2024  Content Version: 14.3    2024 [employer].   Care instructions adapted under license by your healthcare professional. If you have questions about a medical condition or this instruction, always ask your healthcare professional. [employer] disclaims any warranty or liability for your use of this information.    Intimate Partner Violence Safety Instructions: Care Instructions  Overview     If you want to save this information but don't think it is safe to take it home, see if a trusted friend can keep it for you. Plan ahead. Know who you can call for help, and memorize the phone number.   Be careful online too. Your online activity may be seen by others. Do not use your personal computer or device to read about this topic. Use a safe computer, such as one at work, a " friend's home, or a library.    When you are abused by a spouse or partner, you can take actions to protect yourself and your children.  You can increase your safety whether you decide to stay with your spouse or partner or you decide to leave. You may want to make a safety plan and pack a bag ahead of time. This will help you leave quickly when you decide to. Remember, you cannot change your partner's actions, but you can find help for you and your children. No one deserves to be abused.  Follow-up care is a key part of your treatment and safety. Be sure to make and go to all appointments, and call your doctor if you are having problems. It's also a good idea to know your test results and keep a list of the medicines you take.  How can you care for yourself at home?  Make a plan for your safety   If you decide to stay with your abusive spouse or partner, you can do the following to increase your safety:  Decide what works best to keep you safe in an emergency.  Know who you can call to help you in an emergency.  Decide if you will call the police if you get hurt again. If you can, agree on a signal with your children or neighbor to call the police for you if you need help. You can flash lights or hang something out of a window.  Choose a safe place to go for a short time if you need to leave home. Memorize the address and phone number.  Learn escape routes out of your home in case you need to leave in a hurry. Teach your children different ways to get out of your home quickly if they need to.  If you can, hide or lock up things that can be used as weapons (guns, knives, hammers).  Learn the number of a domestic violence shelter. Talk to the people there about how they can help.  Find out about other community resources that can help you.  Take pictures of bruises or other injuries if you can. You can also take pictures of things your abuser has broken.  Teach your children that violence is never okay. Tell them  that they do not deserve to be hurt.  Pack a bag   Prepare a bag with things you will need if you leave suddenly. Leave it with a friend, a relative, or someone else you trust. You could include the following items in the bag:  A set of keys to your home and car.  Emergency phone numbers and addresses.  Money such as cash or checks. You can also ask a friend, a relative, or someone else you trust to hold money for you.  Copies of legal documents such as house and car titles or rent receipts, birth certificates, Social Security card, voter registration, marriage and 's licenses, and your children's health records.  Personal items you would need for a few days, such as clothes, a toothbrush, toothpaste, and any medicines you or your children need.  A favorite toy or book for your child or children.  Diapers and bottles, if you have very young children.  Pictures that show signs of abuse and violence. You may also add pictures of your abuser.  If you leave   If you decide to leave, you can take the following steps:  Go to the emergency room at a hospital if you have been hurt.  Think about asking the police to be with you as you leave. They can protect you as you leave your home.  If you decide to leave secretly, remember that activities can be tracked. Your abuser may still have access to your cell phone, email, and credit cards. It may be possible for these to be traced. Always be aware of your surroundings.  If this is an emergency, do not worry about gathering up anything. Just leave--your safety is most important.  If your abuser moves out, change the locks on the doors. If you have a security system, change the access code.  When should you call for help?   Call 911 anytime you think you may need emergency care. For example, call if:    You or someone else has just been abused.     You think you or someone else is in danger of being abused.   Watch closely for changes in your health, and be sure to contact  "your doctor if you have any problems.  Where can you learn more?  Go to https://www.[employer].net/patiented  Enter A752 in the search box to learn more about \"Intimate Partner Violence Safety Instructions: Care Instructions.\"  Current as of: July 31, 2024  Content Version: 14.3    2024 [employer].   Care instructions adapted under license by your healthcare professional. If you have questions about a medical condition or this instruction, always ask your healthcare professional. [employer] disclaims any warranty or liability for your use of this information.    Learning About Intimate Partner Violence  What is intimate partner violence?  Intimate partner violence is a type of domestic abuse. It's threatening, emotionally harmful, or violent behavior in a personal relationship. It can happen between past or current partners or spouses. In some relationships both people abuse each other. One partner may be more abusive. Or the abuse may be equal.  Abuse can affect people of any ethnic group, race, or Denominational. It can affect teens, adults, or the elderly. And it can happen to people of any sexual orientation, gender, or social status.  Abusers use fear, bullying, and threats to control their partners. They may control what their partners do. They may control where their partners go or who they see. They may act jealous, controlling, or possessive. These early signs of abuse may happen soon after the start of the relationship. Sometimes it can be hard to notice abuse at first. But after the relationship becomes more serious, the abuse may get worse.  If you are being abused in your relationship, it's important to get help. The abuse is not your fault. You don't have to face it alone.  Be careful  It may not be safe to take home domestic abuse information like this handout. Some people ask a trusted friend to keep it for them. It's also important to plan ahead and to memorize the phone number of " places you can go for help. If you are concerned about your safety, do not use your computer, smartphone, or tablet to read about domestic abuse.   What are the types of intimate partner violence?  Abuse can happen in different ways. Each type can happen on its own or in combination with others.  Emotional abuse  Emotional abuse is a pattern of threats, insults, or controlling behavior. It includes verbal abuse. It goes beyond healthy disagreements in a relationship. It's a sign of an unhealthy relationship.  Do you feel threatened, intimidated, or controlled?  Does your partner:  Threaten your children, other family members, or pets?  Use jokes meant to embarrass or shame you?  Call you names?  Tell you that you are a bad parent?  Threaten to take away your children?  Threaten to have you or your family members deported?  Control your access to money or other basic needs?  Control what you do, who you see or talk to, or where you go?  Another form of emotional abuse is denying that it is happening. Or the abuser may act like the abuse is no big deal or is your fault.  Sexual abuse  With sexual abuse, abusers may try to convince or force you to have sex. They may force you into sex acts you're not comfortable with. Or they may sexually assault you. Sexual abuse can happen even if you are in a committed relationship.  Physical abuse  Physical abuse means that a partner hits, kicks, or does something else to physically hurt you. Physical abuse that starts with a slap might lead to kicking, shoving, and choking over time. The abuser may also threaten to hurt or kill you.  Stalking  Stalking means that an abuser gives you attention that you do not want and that causes you fear. Examples of stalking include:  Following you.  Showing up at places where the abuser isn't invited, such as at your work or school.  Constantly calling or texting you.  What problems can  to?  Intimate partner violence can be very  dangerous. It can cause serious, repeated injury. It can even lead to death.  All forms of abuse can cause long-term health problems from the stress of a violent relationship. Verbal abuse can lead to sexual and physical abuse.  Abuse causes:  Emotional pain.  Depression.  Anxiety.  Post-traumatic stress.  Sexual abuse can lead to sexually transmitted infections (such as HIV/AIDS) and unplanned pregnancy.  Pregnancy can be a very dangerous time for people in abusive relationships. Abuse can cause or increase the risk of problems during pregnancy. These include low weight gain, anemia, infections, and bleeding. Abuse may also increase your baby's risk of low birth weight, premature birth, and death.  It can be hard for some victims of abuse to ask for help or to leave their relationship. You may feel scared, stuck, or not sure what steps to take. But it's important not to ignore abuse. Talking to someone you trust could be the first step to ending the abuse and taking care of your own health and happiness again. There are resources available that can help keep you safe.  Where can you get help?  Talk to a trusted friend. Find a local advocacy group, or talk to your doctor about the abuse.  Contact the National Domestic Violence Hotline at 7-954-063-RMBI (1-891.736.7955) for more safety tips. They can guide you to groups in your area that can help. Or go to the National Coalition Against Domestic Violence website at www.thehotline.org to learn more.  Domestic violence groups or a counselor in your area can help you make a safety plan for yourself and your children.  When to call for help  Call 911 anytime you think you may need emergency care. For example, call if:  You think that you or someone you know is in danger of being abused.  You have been hurt and can't have someone safely take you to emergency care.  You have just been abused.  A family member has just been abused.  Where can you learn more?  Go to  "https://www.[employer].net/patiented  Enter S665 in the search box to learn more about \"Learning About Intimate Partner Violence.\"  Current as of: July 31, 2024  Content Version: 14.3    2024 [employer].   Care instructions adapted under license by your healthcare professional. If you have questions about a medical condition or this instruction, always ask your healthcare professional. [employer] disclaims any warranty or liability for your use of this information.    Vaginal Bleeding During Pregnancy: Care Instructions  Overview     It's common to have some vaginal spotting when you are pregnant. In some cases, the bleeding isn't serious. And there aren't any more problems with the pregnancy.  But sometimes bleeding is a sign of a more serious problem. This is more common if the bleeding is heavy or painful. Examples of more serious problems include miscarriage, an ectopic pregnancy, and a problem with the placenta.  You may have to see your doctor again to be sure everything is okay. You may also need more tests to find the cause of the bleeding.  Home treatment may be all you need. But it depends on what is causing the bleeding. Be sure to tell your doctor if you have any new symptoms or if your symptoms get worse.  The doctor has checked you carefully, but problems can develop later. If you notice any problems or new symptoms, get medical treatment right away.  Follow-up care is a key part of your treatment and safety. Be sure to make and go to all appointments, and call your doctor if you are having problems. It's also a good idea to know your test results and keep a list of the medicines you take.  How can you care for yourself at home?  If your doctor prescribed medicines, take them exactly as directed. Call your doctor if you think you are having a problem with your medicine.  Do not have vaginal sex until your doctor says it's okay.  Do not put anything in your vagina until your " doctor says it's okay.  Ask your doctor about other activities you can or can't do.  Get a lot of rest. Being pregnant can make you tired.  Do not use nonsteroidal anti-inflammatory drugs (NSAIDs), such as ibuprofen (Advil, Motrin), naproxen (Aleve), or aspirin, unless your doctor says it is okay.  When should you call for help?   Call 911 anytime you think you may need emergency care. For example, call if:    You passed out (lost consciousness).     You have severe vaginal bleeding. This means you are soaking through a pad each hour for 2 or more hours.     You have sudden, severe pain in your belly or pelvis.   Call your doctor now or seek immediate medical care if:    You have new or worse vaginal bleeding.     You are dizzy or lightheaded, or you feel like you may faint.     You have pain in your belly, pelvis, or lower back.     You think that you are in labor.     You have a sudden release of fluid from your vagina.     You've been having regular contractions for an hour. This means that you've had at least 8 contractions within 1 hour or at least 4 contractions within 20 minutes, even after you change your position and drink fluids.     You notice that your baby has stopped moving or is moving much less than normal.   Watch closely for changes in your health, and be sure to contact your doctor if you have any problems.  Current as of: April 30, 2024  Content Version: 14.3    2024 [employer].   Care instructions adapted under license by your healthcare professional. If you have questions about a medical condition or this instruction, always ask your healthcare professional. [employer] disclaims any warranty or liability for your use of this information.

## 2025-01-29 NOTE — PROGRESS NOTES
SUBJECTIVE:     HPI:    This is a 34 year old female patient,  who presents for her first obstetrical visit.    MITESH: 2025, by Last Menstrual Period.  She is 8w5d weeks.  Her cycles are regular.  Her last menstrual period was normal.   Since her LMP, she has experienced  nausea and fatigue).   She denies emesis, abdominal pain, headache, loss of appetite, vaginal discharge, dysuria, pelvic pain, urinary urgency, lightheadedness, urinary frequency, vaginal bleeding, hemorrhoids, and constipation.    Additional History: Second Pregnancy  Hx: PreE, DVT due to throacic outlet syndrome, did remove one rib  Anx-Lexapro 10mg    Desires NIPS    Already met with MFM, will need blood thinners in pregnancy  Already started apirin    Shoulder dystocia with vacuum, <30 seconds, 3rd degree laceration    Pt sister with Type I diabetes and malignant hyperthermia     Have you travelled during the pregnancy?No  Have your sexual partner(s) travelled during the pregnancy?No      HISTORY:   Planned Pregnancy: Yes  Marital Status: Single  Occupation:   Living in Household: Spouse and Children    Past History:  Her past medical history   Past Medical History:   Diagnosis Date    Anxiety     DVT (deep venous thrombosis) (H)     jugular vein    IUD (intrauterine device) in place 2023    Placed 2023     Type: Mirena  LOT [id number]  Remove/Replace by: 2031     Expiration Date:  2026    Mild pre-eclampsia 2023    PCOS (polycystic ovarian syndrome)     Uncomplicated asthma     as child   .      She has a history of  preeclampsia and shoulder dystocia, DVT    Since her last LMP she denies use of alcohol, tobacco and street drugs.    Past medical, surgical, social and family history were reviewed and updated in [facility].      Current Outpatient Medications   Medication Sig Dispense Refill    aspirin (ASA) 81 MG chewable tablet Take 81 mg by mouth daily.      escitalopram (LEXAPRO) 10 MG tablet Take 1  tablet (10 mg) by mouth at bedtime 30 tablet 0    Prenatal Vit-Fe Fumarate-FA (PRENATAL VITAMIN PO) Take 1 tablet by mouth daily.       No current facility-administered medications for this visit.       ROS:   12 point review of systems negative other than symptoms noted below or in the HPI.  Constitutional: Fatigue  Gastrointestinal: Nausea and Vomiting      OBJECTIVE:     EXAM:  LMP 2024  There is no height or weight on file to calculate BMI.      ASSESSMENT/PLAN:       ICD-10-CM    1. Supervision of high risk pregnancy in first trimester  O09.91           34 year old , 8w5d weeks of pregnancy with MITESH of 2025, by Last Menstrual Period    Confirmed patient DESIRES delivery at [facility] with the [facility] provider.    Nurse phone visit completed. Prenatal book and folder (containing standard educational hand-outs and brochures) will be given at next visit to patient. Information in folder reviewed over the phone. Questions answered. Brochure given on optional screening available to assess chromosomal anomalies. Pt desires NIPS. Pt advised to call the clinic if she has any questions or concerns related to her pregnancy. Prenatal labs future ordered. New prenatal visit scheduled  with George Meeks RN on 2025 at 11:23 AM   [facility]  .    Lab Results   Component Value Date    PAP NIL 2020       PHQ-9 score:        2025    10:17 AM   PHQ   PHQ-9 Total Score 5    Q9: Thoughts of better off dead/self-harm past 2 weeks Not at all       Patient-reported                   10/11/2022     3:38 PM 2023     4:13 PM 2025    10:18 AM   HAO-7 SCORE   Total Score 1 (minimal anxiety)  2 (minimal anxiety)   Total Score 1 11 2        Patient-reported           Patient supplied answers from flow sheet for:  Prenatal OB Questionnaire.  Past Medical History  Have you ever recieved care for your mental health? : No  Have you ever been in a major  accident or suffered serious trauma?: No  Within the last year, has anyone hit, slapped, kicked or otherwise hurt you?: No  In the last year, has anyone forced you to have sex when you didn't want to?: No    Past Medical History 2   Have you ever received a blood transfusion?: No  Would you accept a blood transfusion if was medically recommended?: Yes  Does anyone in your home smoke?: No   Is your blood type Rh negative?: Unknown  Have you ever ?: (!) Yes  Have you been hospitalized for a nonsurgical reason excluding normal delivery?: (!) Yes  Have you ever had an abnormal pap smear?: No    Past Medical History (Continued)  Do you have a history of abnormalities of the uterus?: No  Did your mother take EDUARD or any other hormones when she was pregnant with you?: No  Do you have any other problems we have not asked about which you feel may be important to this pregnancy?: No

## 2025-01-31 ENCOUNTER — PRENATAL OFFICE VISIT (OUTPATIENT)
Dept: OBGYN | Facility: CLINIC | Age: 35
End: 2025-01-31
Attending: OBSTETRICS & GYNECOLOGY
Payer: COMMERCIAL

## 2025-01-31 ENCOUNTER — ANCILLARY PROCEDURE (OUTPATIENT)
Dept: ULTRASOUND IMAGING | Facility: CLINIC | Age: 35
End: 2025-01-31
Attending: OBSTETRICS & GYNECOLOGY
Payer: COMMERCIAL

## 2025-01-31 VITALS — SYSTOLIC BLOOD PRESSURE: 102 MMHG | BODY MASS INDEX: 29.13 KG/M2 | DIASTOLIC BLOOD PRESSURE: 64 MMHG | WEIGHT: 186 LBS

## 2025-01-31 DIAGNOSIS — Z86.718 PERSONAL HISTORY OF DVT (DEEP VEIN THROMBOSIS): ICD-10-CM

## 2025-01-31 DIAGNOSIS — O36.80X0 PREGNANCY WITH INCONCLUSIVE FETAL VIABILITY: ICD-10-CM

## 2025-01-31 DIAGNOSIS — O09.299 HX OF PRE-ECLAMPSIA IN PRIOR PREGNANCY, CURRENTLY PREGNANT: ICD-10-CM

## 2025-01-31 DIAGNOSIS — Z3A.09 9 WEEKS GESTATION OF PREGNANCY: ICD-10-CM

## 2025-01-31 DIAGNOSIS — O09.91 SUPERVISION OF HIGH RISK PREGNANCY IN FIRST TRIMESTER: Primary | ICD-10-CM

## 2025-01-31 DIAGNOSIS — Z12.4 CERVICAL CANCER SCREENING: ICD-10-CM

## 2025-01-31 DIAGNOSIS — O09.91 SUPERVISION OF HIGH RISK PREGNANCY IN FIRST TRIMESTER: ICD-10-CM

## 2025-01-31 LAB
ALBUMIN UR-MCNC: 30 MG/DL
APPEARANCE UR: ABNORMAL
BACTERIA #/AREA URNS HPF: ABNORMAL /HPF
BILIRUB UR QL STRIP: NEGATIVE
COLOR UR AUTO: YELLOW
GLUCOSE UR STRIP-MCNC: NEGATIVE MG/DL
HGB UR QL STRIP: ABNORMAL
KETONES UR STRIP-MCNC: NEGATIVE MG/DL
LEUKOCYTE ESTERASE UR QL STRIP: ABNORMAL
MUCOUS THREADS #/AREA URNS LPF: PRESENT /LPF
NITRATE UR QL: NEGATIVE
PH UR STRIP: 5.5 [PH] (ref 5–7)
RBC #/AREA URNS AUTO: ABNORMAL /HPF
SP GR UR STRIP: >=1.03 (ref 1–1.03)
SQUAMOUS #/AREA URNS AUTO: ABNORMAL /LPF
UROBILINOGEN UR STRIP-ACNC: 0.2 E.U./DL
WBC #/AREA URNS AUTO: ABNORMAL /HPF

## 2025-01-31 PROCEDURE — 81001 URINALYSIS AUTO W/SCOPE: CPT | Performed by: OBSTETRICS & GYNECOLOGY

## 2025-01-31 PROCEDURE — 76817 TRANSVAGINAL US OBSTETRIC: CPT | Performed by: OBSTETRICS & GYNECOLOGY

## 2025-01-31 PROCEDURE — 87086 URINE CULTURE/COLONY COUNT: CPT | Performed by: OBSTETRICS & GYNECOLOGY

## 2025-01-31 PROCEDURE — G2211 COMPLEX E/M VISIT ADD ON: HCPCS | Performed by: OBSTETRICS & GYNECOLOGY

## 2025-01-31 PROCEDURE — G0145 SCR C/V CYTO,THINLAYER,RESCR: HCPCS | Performed by: OBSTETRICS & GYNECOLOGY

## 2025-01-31 PROCEDURE — 99459 PELVIC EXAMINATION: CPT | Performed by: OBSTETRICS & GYNECOLOGY

## 2025-01-31 PROCEDURE — 99215 OFFICE O/P EST HI 40 MIN: CPT | Performed by: OBSTETRICS & GYNECOLOGY

## 2025-01-31 PROCEDURE — 87624 HPV HI-RISK TYP POOLED RSLT: CPT | Performed by: OBSTETRICS & GYNECOLOGY

## 2025-01-31 RX ORDER — ENOXAPARIN SODIUM 100 MG/ML
40 INJECTION SUBCUTANEOUS DAILY
Qty: 40 ML | Refills: 2 | Status: SHIPPED | OUTPATIENT
Start: 2025-01-31

## 2025-01-31 NOTE — PROGRESS NOTES
SUBJECTIVE:      HPI:     This is a 34 year old female patient,  who presents for her first obstetrical visit.     MITESH: 2025, by Last Menstrual Period.  She is 8w5d weeks.  Her cycles are regular.  Her last menstrual period was normal.   Since her LMP, she has experienced  nausea and fatigue).   She denies emesis, abdominal pain, headache, loss of appetite, vaginal discharge, dysuria, pelvic pain, urinary urgency, lightheadedness, urinary frequency, vaginal bleeding, hemorrhoids, and constipation.     Additional History:   Pre-eclampsia at 37 weeks with prior pregnancy. Patient expresses frustration with how long it took to be diagnosed.  She was in triage multiple night in a row with complaints/symptoms of pre-eclampsia and felt that she was dismissed. She is very concerned about what her experience will be this time.   April of this year patient developed DVT due to throacic outlet syndrome, did remove one rib. Saw [facility] and was recommended to start on Lovenox 40mg daily. She has not started this medication yet.    Anxiety-Lexapro 10mg     Desires NIPS     Already started aspirin for pre-eclampsia prevention    -Additionally recommended    1. Level 2 US at 18-20 weeks   2. Continue Lovenox through 6 weeks PP   3. Growth US @ 36 weeks to discuss route of delivery given history of shoulder dystocia     Shoulder dystocia with vacuum, <30 seconds, 3rd degree laceration, Dr. Alatorre delivered last pregnancy. She is worried about reoccurrence.      Pt sister with Type I diabetes and malignant hyperthermia      Have you travelled during the pregnancy?No  Have your sexual partner(s) travelled during the pregnancy?No        HISTORY:   Planned Pregnancy: Yes  Marital Status: Single  Occupation:   Living in Household: Spouse and Children     Past History:  Her past medical history   Past Medical History        Past Medical History:   Diagnosis Date    Anxiety      DVT (deep venous thrombosis) (H)        jugular vein    IUD (intrauterine device) in place 2023     Placed 2023     Type: Mirena  LOT [id number]  Remove/Replace by: 2031     Expiration Date:  2026    Mild pre-eclampsia 2023    PCOS (polycystic ovarian syndrome)     Uncomplicated asthma       as child      .       She has a history of  preeclampsia and shoulder dystocia, DVT     Since her last LMP she denies use of alcohol, tobacco and street drugs.     Past medical, surgical, social and family history were reviewed and updated in [facility].        Current Facility-Administered Medications          Current Outpatient Medications   Medication Sig Dispense Refill    aspirin (ASA) 81 MG chewable tablet Take 81 mg by mouth daily.        escitalopram (LEXAPRO) 10 MG tablet Take 1 tablet (10 mg) by mouth at bedtime 30 tablet 0    Prenatal Vit-Fe Fumarate-FA (PRENATAL VITAMIN PO) Take 1 tablet by mouth daily.          No current facility-administered medications for this visit.            ROS:   12 point review of systems negative other than symptoms noted below or in the HPI.  Constitutional: Fatigue  Gastrointestinal: Nausea and Vomiting        OBJECTIVE:      EXAM:  LMP 2024  There is no height or weight on file to calculate BMI.        ASSESSMENT/PLAN:          ICD-10-CM     1. Supervision of high risk pregnancy in first trimester  O09.91               34 year old , 8w5d weeks of pregnancy with MITESH of 2025, by Last Menstrual Period     Confirmed patient DESIRES delivery at [facility] with the [facility] provider.     Nurse phone visit completed. Prenatal book and folder (containing standard educational hand-outs and brochures) will be given at next visit to patient. Information in folder reviewed over the phone. Questions answered. Brochure given on optional screening available to assess chromosomal anomalies. Pt desires NIPS. Pt advised to call the clinic if she has any questions or  concerns related to her pregnancy. Prenatal labs future ordered. New prenatal visit scheduled  with George Meeks RN on 1/29/2025 at 11:23 AM   [facility]  .           Lab Results   Component Value Date     PAP NIL 09/29/2020         PHQ-9 score:         1/28/2025    10:17 AM   PHQ   PHQ-9 Total Score 5    Q9: Thoughts of better off dead/self-harm past 2 weeks Not at all       Patient-reported              10/11/2022     3:38 PM 6/29/2023     4:13 PM 1/28/2025    10:18 AM   HAO-7 SCORE   Total Score 1 (minimal anxiety)   2 (minimal anxiety)   Total Score 1 11 2        Patient-reported         Patient supplied answers from flow sheet for:  Prenatal OB Questionnaire.  Past Medical History  Have you ever recieved care for your mental health? : No  Have you ever been in a major accident or suffered serious trauma?: No  Within the last year, has anyone hit, slapped, kicked or otherwise hurt you?: No  In the last year, has anyone forced you to have sex when you didn't want to?: No     Past Medical History 2   Have you ever received a blood transfusion?: No  Would you accept a blood transfusion if was medically recommended?: Yes  Does anyone in your home smoke?: No   Is your blood type Rh negative?: Unknown  Have you ever ?: (!) Yes  Have you been hospitalized for a nonsurgical reason excluding normal delivery?: (!) Yes  Have you ever had an abnormal pap smear?: No     Past Medical History (Continued)  Do you have a history of abnormalities of the uterus?: No  Did your mother take EDUARD or any other hormones when she was pregnant with you?: No  Do you have any other problems we have not asked about which you feel may be important to this pregnancy?: No         OBJECTIVE:     EXAM:  /64   Wt 84.4 kg (186 lb)   LMP 11/29/2024   Breastfeeding No   BMI 29.13 kg/m   Body mass index is 29.13 kg/m .    GENERAL: alert and no distress  EYES: Eyes grossly normal to inspection, PERRL and conjunctivae  and sclerae normal  NECK: no adenopathy, no asymmetry, masses, or scars  RESP: lungs clear to auscultation - no rales, rhonchi or wheezes  CV: regular rate and rhythm, normal S1 S2, no S3 or S4, no murmur, click or rub, no peripheral edema  ABDOMEN: soft, nontender, no hepatosplenomegaly, no masses and bowel sounds normal   (female) w/bimanual: normal female external genitalia, normal urethral meatus, normal vaginal mucosa, and normal cervix/adnexa/uterus without masses or discharge. Pap collected  MS: no gross musculoskeletal defects noted, no edema  SKIN: no suspicious lesions or rashes  NEURO: Normal strength and tone, mentation intact and speech normal  PSYCH: mentation appears normal, affect normal/bright    ASSESSMENT/PLAN:       ICD-10-CM    1. Supervision of high risk pregnancy in first trimester  O09.91 Urine Culture Aerobic Bacterial     *UA reflex to Microscopic     Urine Microscopic Exam     [facility] Referral - Pregnancy      2. Cervical cancer screening  Z12.4 HPV and Gynecologic Cytology Panel - Recommended Age 30-65 Years      3. Personal history of DVT (deep vein thrombosis)  Z86.718 enoxaparin ANTICOAGULANT (LOVENOX) 40 MG/0.4ML syringe     [facility] Referral - Pregnancy      4. Hx of pre-eclampsia in prior pregnancy, currently pregnant  O09.299       5. 9 weeks gestation of pregnancy  Z3A.09           34 year old , On 2025 patient is 9w0d weeks of pregnancy with MITESH of 2025, by Last Menstrual Period    PLAN/PATIENT INSTRUCTIONS:  - Reviewed early pregnancy education, diet, exercise, prenatal vitamins, intercourse, and travel. Reviewed the call schedule, labor and delivery, and the schedule of prenatal visits.  - Reviewed MFM consult note  - Prenatal labs that will be done reviewed. Will also obtain baseline pre-eclampsia labs  - Pap smear done today: Yes.    Discussed options for screening for and diagnosis of chromosomal anomalies, including first  trimester screen, noninvasive prenatal testing/cell-free fetal DNA testing, CVS/amniocentesis, quad screen, and ultrasound or comprehensive Level II US at 18-20 weeks. She agreed noninvasive prenatal testing.  - NIPT pending; AFP will reviewed at 16 week visit  - Level 2 US pending   - Pre-eclampsia prevention:   81mg aspirin started at 12 weeks   - 3rd tri labs: due at 28 weeks  - GBS due at 36 weeks  - Reviewed Labor and PTL precautions, expected fetal movement and kick counts, PreE signs/symptoms  - TWG: -2 lbs. Pregravid BMI 29 Expect 15-25 lbs.  - follow-up in 4 weeks    Sara Vazquez MD    I SPENT 45 MINUTES TOTAL ON 2/1/2025 PERFORMING THE FOLLOWING: Preparing to see patient (review tests, chart), Obtaining/reviewing separately obtained history, Counseling/educating patient/family/caregiver, Ordering medication/tests/procedures, Documenting into the EHR, Independently interpreting/communicating results to patient/family/caregiver, and Coordination of care

## 2025-02-01 LAB — BACTERIA UR CULT: NORMAL

## 2025-02-03 ENCOUNTER — TELEPHONE (OUTPATIENT)
Dept: MATERNAL FETAL MEDICINE | Facility: CLINIC | Age: 35
End: 2025-02-03
Payer: COMMERCIAL

## 2025-02-03 LAB
HPV HR 12 DNA CVX QL NAA+PROBE: NEGATIVE
HPV16 DNA CVX QL NAA+PROBE: NEGATIVE
HPV18 DNA CVX QL NAA+PROBE: NEGATIVE
HUMAN PAPILLOMA VIRUS FINAL DIAGNOSIS: NORMAL

## 2025-02-03 NOTE — TELEPHONE ENCOUNTER
February 3, 2025    I called Rakel to discuss the results of her and her partner, Madison, carrier screening.     The couple did not screen mutually positive for any of the conditions assessed. Rakel did not screen positive for any of the x-linked conditions. This significantly reduces the couple's reproductive risk for this group of conditions.    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 Biotinidase deficiency is caused by mutations in the BTD gene.  Nicholas's carrier screen was negative for disease-causing variants in BTD. Therefore, the pregnancy is at low risk for this condition.    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 Individuals with  APS1 have at least two of the disease's main symptoms: fungal infections of the skin and mucous membranes (chronic mucocutaneous  candidiasis), decreased function in the parathyroid glands (hypoparathyroidism), and decreased function in the adrenal glands  (Mike's disease).  [place] carrier screen was negative for disease-causing variants in AIRE. Therefore, the pregnancy is at low risk for this condition.    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  Nicholas's carrier screen was negative for disease-causing variants in NPHS2. Therefore, the pregnancy is at low risk for this condition.      Nicholas screened positive for 1 condition:  Pseudocholinesterase Deficiency  (BCHE:c.158dupT)  Pseudocholinesterase deficiency is a condition that causes individuals to have problems with certain anesthesia medications. It is caused by harmful genetic changes (variants) in the BCHE gene. Individuals with pseudocholinesterase deficiency do not have enough of an enzyme called pseudocholinesterase, which is needed to break down medications such as succinylcholine or mivacurium properly. These medications are often used during surgery to help relax muscles in the body. Individuals who have pseudocholinesterase deficiency may have muscle paralysis for a longer time than most people after surgery. They may also stop breathing after taking these medications. Individuals usually do not know that they have pseudocholinesterase deficiency until they experience symptoms after receiving anesthesia. While the condition is not inherited in an X-linked manner, biological males are more often affected than biological females.  ADDITIONAL CONSIDERATIONS FOR CARRIERS   Carriers generally do not have symptoms, but they may sometimes experience a short period of breathing paralysis following anesthesia. Encouraged Rakel to have Nicholas share this with his primary care provider. Additionally she can share this information with her children's pediatricians.     Any of the couple's children will have a 50% chance of being carriers of these conditions. This is also true for the couple's siblings. Other family members could also be at risk to be carriers and the couple was encouraged to share this information with their families.       LELE RASHID MS, [facility]  Genetic Counselor   [facility]  Maternal Fetal Medicine  Office: [phone]   [facility]: [phone]   Fax: [phone]  [facility]

## 2025-02-05 LAB
BKR AP ASSOCIATED HPV REPORT: NORMAL
BKR LAB AP GYN ADEQUACY: NORMAL
BKR LAB AP GYN INTERPRETATION: NORMAL
BKR LAB AP PREVIOUS ABNORMAL: NORMAL
PATH REPORT.COMMENTS IMP SPEC: NORMAL
PATH REPORT.COMMENTS IMP SPEC: NORMAL
PATH REPORT.RELEVANT HX SPEC: NORMAL

## 2025-02-06 LAB
ABO + RH BLD: NORMAL
BLD GP AB SCN SERPL QL: NEGATIVE
SPECIMEN EXP DATE BLD: NORMAL

## 2025-02-07 ENCOUNTER — LAB (OUTPATIENT)
Dept: LAB | Facility: CLINIC | Age: 35
End: 2025-02-07
Payer: COMMERCIAL

## 2025-02-07 DIAGNOSIS — Z13.79 GENETIC SCREENING: ICD-10-CM

## 2025-02-07 DIAGNOSIS — D50.9 MICROCYTIC HYPOCHROMIC ANEMIA: ICD-10-CM

## 2025-02-07 DIAGNOSIS — O09.91 SUPERVISION OF HIGH RISK PREGNANCY IN FIRST TRIMESTER: ICD-10-CM

## 2025-02-07 LAB
ERYTHROCYTE [DISTWIDTH] IN BLOOD BY AUTOMATED COUNT: 17.9 % (ref 10–15)
EST. AVERAGE GLUCOSE BLD GHB EST-MCNC: 111 MG/DL
HBA1C MFR BLD: 5.5 % (ref 0–5.6)
HCT VFR BLD AUTO: 37.9 % (ref 35–47)
HGB BLD-MCNC: 11.8 G/DL (ref 11.7–15.7)
MCH RBC QN AUTO: 23.6 PG (ref 26.5–33)
MCHC RBC AUTO-ENTMCNC: 31.1 G/DL (ref 31.5–36.5)
MCV RBC AUTO: 76 FL (ref 78–100)
PLATELET # BLD AUTO: 379 10E3/UL (ref 150–450)
RBC # BLD AUTO: 5 10E6/UL (ref 3.8–5.2)
T PALLIDUM AB SER QL: NONREACTIVE
WBC # BLD AUTO: 14.3 10E3/UL (ref 4–11)

## 2025-02-07 PROCEDURE — 86850 RBC ANTIBODY SCREEN: CPT

## 2025-02-07 PROCEDURE — 83550 IRON BINDING TEST: CPT

## 2025-02-07 PROCEDURE — 83036 HEMOGLOBIN GLYCOSYLATED A1C: CPT

## 2025-02-07 PROCEDURE — 85027 COMPLETE CBC AUTOMATED: CPT

## 2025-02-07 PROCEDURE — 80053 COMPREHEN METABOLIC PANEL: CPT

## 2025-02-07 PROCEDURE — 36415 COLL VENOUS BLD VENIPUNCTURE: CPT

## 2025-02-07 PROCEDURE — 86762 RUBELLA ANTIBODY: CPT

## 2025-02-07 PROCEDURE — 83540 ASSAY OF IRON: CPT

## 2025-02-07 PROCEDURE — 86780 TREPONEMA PALLIDUM: CPT

## 2025-02-07 PROCEDURE — 84156 ASSAY OF PROTEIN URINE: CPT

## 2025-02-07 PROCEDURE — 87340 HEPATITIS B SURFACE AG IA: CPT

## 2025-02-07 PROCEDURE — 82728 ASSAY OF FERRITIN: CPT

## 2025-02-07 PROCEDURE — 87389 HIV-1 AG W/HIV-1&-2 AB AG IA: CPT

## 2025-02-07 PROCEDURE — 86901 BLOOD TYPING SEROLOGIC RH(D): CPT

## 2025-02-07 PROCEDURE — 86900 BLOOD TYPING SEROLOGIC ABO: CPT

## 2025-02-07 PROCEDURE — 86803 HEPATITIS C AB TEST: CPT

## 2025-02-08 LAB
ALBUMIN MFR UR ELPH: 20 MG/DL
ALBUMIN SERPL BCG-MCNC: 3.5 G/DL (ref 3.5–5.2)
ALP SERPL-CCNC: 52 U/L (ref 40–150)
ALT SERPL W P-5'-P-CCNC: 7 U/L (ref 0–50)
ANION GAP SERPL CALCULATED.3IONS-SCNC: 12 MMOL/L (ref 7–15)
AST SERPL W P-5'-P-CCNC: 17 U/L (ref 0–45)
BILIRUB SERPL-MCNC: 0.2 MG/DL
BUN SERPL-MCNC: 7.7 MG/DL (ref 6–20)
CALCIUM SERPL-MCNC: 9.3 MG/DL (ref 8.8–10.4)
CHLORIDE SERPL-SCNC: 106 MMOL/L (ref 98–107)
CREAT SERPL-MCNC: 0.65 MG/DL (ref 0.51–0.95)
CREAT UR-MCNC: 219 MG/DL
EGFRCR SERPLBLD CKD-EPI 2021: >90 ML/MIN/1.73M2
GLUCOSE SERPL-MCNC: 70 MG/DL (ref 70–99)
HBV SURFACE AG SERPL QL IA: NONREACTIVE
HCO3 SERPL-SCNC: 19 MMOL/L (ref 22–29)
HCV AB SERPL QL IA: NONREACTIVE
HIV 1+2 AB+HIV1 P24 AG SERPL QL IA: NONREACTIVE
POTASSIUM SERPL-SCNC: 3.5 MMOL/L (ref 3.4–5.3)
PROT SERPL-MCNC: 6.7 G/DL (ref 6.4–8.3)
PROT/CREAT 24H UR: 0.09 MG/MG CR (ref 0–0.2)
SODIUM SERPL-SCNC: 137 MMOL/L (ref 135–145)

## 2025-02-09 ENCOUNTER — NURSE TRIAGE (OUTPATIENT)
Dept: NURSING | Facility: CLINIC | Age: 35
End: 2025-02-09
Payer: COMMERCIAL

## 2025-02-10 DIAGNOSIS — D50.8 IRON DEFICIENCY ANEMIA SECONDARY TO INADEQUATE DIETARY IRON INTAKE: Primary | ICD-10-CM

## 2025-02-10 DIAGNOSIS — D50.9 MICROCYTIC HYPOCHROMIC ANEMIA: Primary | ICD-10-CM

## 2025-02-10 LAB
FERRITIN SERPL-MCNC: 19 NG/ML (ref 6–175)
IRON BINDING CAPACITY (ROCHE): 329 UG/DL (ref 240–430)
IRON SATN MFR SERPL: 7 % (ref 15–46)
IRON SERPL-MCNC: 24 UG/DL (ref 37–145)
RUBV IGG SERPL QL IA: 1.61 INDEX
RUBV IGG SERPL QL IA: POSITIVE

## 2025-02-10 RX ORDER — MEPERIDINE HYDROCHLORIDE 25 MG/ML
25 INJECTION INTRAMUSCULAR; INTRAVENOUS; SUBCUTANEOUS
OUTPATIENT
Start: 2025-02-10

## 2025-02-10 RX ORDER — HEPARIN SODIUM (PORCINE) LOCK FLUSH IV SOLN 100 UNIT/ML 100 UNIT/ML
5 SOLUTION INTRAVENOUS
OUTPATIENT
Start: 2025-02-10

## 2025-02-10 RX ORDER — ALBUTEROL SULFATE 0.83 MG/ML
2.5 SOLUTION RESPIRATORY (INHALATION)
OUTPATIENT
Start: 2025-02-10

## 2025-02-10 RX ORDER — METHYLPREDNISOLONE SODIUM SUCCINATE 40 MG/ML
40 INJECTION INTRAMUSCULAR; INTRAVENOUS
Start: 2025-02-10

## 2025-02-10 RX ORDER — DIPHENHYDRAMINE HYDROCHLORIDE 50 MG/ML
25 INJECTION INTRAMUSCULAR; INTRAVENOUS
Start: 2025-02-10

## 2025-02-10 RX ORDER — ALBUTEROL SULFATE 90 UG/1
1-2 INHALANT RESPIRATORY (INHALATION)
Start: 2025-02-10

## 2025-02-10 RX ORDER — HEPARIN SODIUM,PORCINE 10 UNIT/ML
5-20 VIAL (ML) INTRAVENOUS DAILY PRN
OUTPATIENT
Start: 2025-02-10

## 2025-02-10 RX ORDER — DIPHENHYDRAMINE HYDROCHLORIDE 50 MG/ML
50 INJECTION INTRAMUSCULAR; INTRAVENOUS
Start: 2025-02-10

## 2025-02-10 RX ORDER — EPINEPHRINE 1 MG/ML
0.3 INJECTION, SOLUTION, CONCENTRATE INTRAVENOUS EVERY 5 MIN PRN
OUTPATIENT
Start: 2025-02-10

## 2025-02-10 NOTE — TELEPHONE ENCOUNTER
Nurse Triage SBAR    Is this a 2nd Level Triage? NO    Situation: weakness, headache, dizziness    Background: Patient calling to report weakness, frontal headache and dizziness over the past few days.  Reports she recently had labs done and noticed her hemoglobin is on the lower end of the range and she has begun taking iron as of Friday.  Reports leaving work due to fatigue and is finding it hard to do things during the day.  Notes that she has had a headache and its located in the front portion of her head and she has also experienced some dizziness when going from sitting to standing. BP today was 100/64.      Assessment: generalized weakness, headache, dizziness with position change    Protocol Recommended Disposition:   No disposition on file.    Recommendation: Advised patient to be seen by provider within the next 4 hours. Reviewed concerning symptoms and when to call back.      Does the patient meet one of the following criteria for ADS visit consideration? No  Toya Andrews RN on 2/9/2025 at 6:42 PM      Reason for Disposition   [1] MODERATE weakness (i.e., interferes with work, school, normal activities) AND [2] cause unknown  (Exceptions: Weakness from acute minor illness or poor fluid intake; weakness is chronic and not worse.)    Additional Information   Negative: SEVERE difficulty breathing (e.g., struggling for each breath, speaks in single words)   Negative: Shock suspected (e.g., cold/pale/clammy skin, too weak to stand, low BP, rapid pulse)   Negative: Difficult to awaken or acting confused (e.g., disoriented, slurred speech)   Negative: [1] Fainted > 15 minutes ago AND [2] still feels too weak or dizzy to stand   Negative: [1] SEVERE weakness (i.e., unable to walk or barely able to walk, requires support) AND [2] new-onset or worsening   Negative: Sounds like a life-threatening emergency to the triager   Negative: Weakness of the face, arm or leg on one side of the body   Negative: [1] Diabetes  "mellitus AND [2] weakness from low blood sugar (i.e., < 60 mg/dl or 3.5 mmol/l)   Negative: Heat exhaustion suspected (i.e., dehydration from heat exposure)   Negative: Chest pain   Negative: Vomiting is main symptom   Negative: Diarrhea is main symptom   Negative: Difficulty breathing   Negative: Heart beating < 50 beats per minute OR > 140 beats per minute   Negative: Extra heartbeats, irregular heart beating, or heart is beating very fast  (i.e., \"palpitations\")   Negative: Follows large amount of bleeding (e.g., from vomiting, rectum, vagina  (Exception: Small brief weakness from sight of a small amount blood.)   Negative: Black or tarry bowel movements   Negative: [1] Drinking very little AND [2] dehydration suspected (e.g., no urine > 12 hours, very dry mouth, very lightheaded)   Negative: Patient sounds very sick or weak to the triager    Protocols used: Weakness (Generalized) and Fatigue-A-AH    "

## 2025-02-11 ENCOUNTER — TELEPHONE (OUTPATIENT)
Dept: OBGYN | Facility: CLINIC | Age: 35
End: 2025-02-11
Payer: COMMERCIAL

## 2025-02-11 NOTE — TELEPHONE ENCOUNTER
[facility] Call [facility]    Phone Message    May a detailed message be left on voicemail: yes     Reason for Call: Other: Pt returned missed call about lab results but writer was unable to reach secure chat. Please contact pt.      Action Taken: Message routed to:  Other: [facility]    Travel Screening: Not Applicable     Date of Service:

## 2025-02-11 NOTE — TELEPHONE ENCOUNTER
Informed of recommendations for iron infusions. Pt will call today to schedule.   Cynthia Ospina RN on 2/11/2025 at 9:44 AM

## 2025-02-17 ENCOUNTER — INFUSION THERAPY VISIT (OUTPATIENT)
Dept: INFUSION THERAPY | Facility: CLINIC | Age: 35
End: 2025-02-17
Attending: OBSTETRICS & GYNECOLOGY
Payer: COMMERCIAL

## 2025-02-17 VITALS
DIASTOLIC BLOOD PRESSURE: 80 MMHG | HEART RATE: 90 BPM | TEMPERATURE: 97.6 F | SYSTOLIC BLOOD PRESSURE: 125 MMHG | RESPIRATION RATE: 16 BRPM | OXYGEN SATURATION: 99 %

## 2025-02-17 DIAGNOSIS — D50.8 IRON DEFICIENCY ANEMIA SECONDARY TO INADEQUATE DIETARY IRON INTAKE: Primary | ICD-10-CM

## 2025-02-17 PROCEDURE — 96374 THER/PROPH/DIAG INJ IV PUSH: CPT

## 2025-02-17 PROCEDURE — 258N000003 HC RX IP 258 OP 636: Performed by: OBSTETRICS & GYNECOLOGY

## 2025-02-17 PROCEDURE — 250N000011 HC RX IP 250 OP 636: Performed by: OBSTETRICS & GYNECOLOGY

## 2025-02-17 RX ORDER — ALBUTEROL SULFATE 0.83 MG/ML
2.5 SOLUTION RESPIRATORY (INHALATION)
OUTPATIENT
Start: 2025-02-19

## 2025-02-17 RX ORDER — HEPARIN SODIUM,PORCINE 10 UNIT/ML
5-20 VIAL (ML) INTRAVENOUS DAILY PRN
OUTPATIENT
Start: 2025-02-19

## 2025-02-17 RX ORDER — DIPHENHYDRAMINE HYDROCHLORIDE 50 MG/ML
50 INJECTION INTRAMUSCULAR; INTRAVENOUS
Start: 2025-02-19

## 2025-02-17 RX ORDER — ALBUTEROL SULFATE 90 UG/1
1-2 INHALANT RESPIRATORY (INHALATION)
Start: 2025-02-19

## 2025-02-17 RX ORDER — HEPARIN SODIUM (PORCINE) LOCK FLUSH IV SOLN 100 UNIT/ML 100 UNIT/ML
5 SOLUTION INTRAVENOUS
OUTPATIENT
Start: 2025-02-19

## 2025-02-17 RX ORDER — DIPHENHYDRAMINE HYDROCHLORIDE 50 MG/ML
25 INJECTION INTRAMUSCULAR; INTRAVENOUS
Start: 2025-02-19

## 2025-02-17 RX ORDER — METHYLPREDNISOLONE SODIUM SUCCINATE 40 MG/ML
40 INJECTION INTRAMUSCULAR; INTRAVENOUS
Start: 2025-02-19

## 2025-02-17 RX ORDER — MEPERIDINE HYDROCHLORIDE 25 MG/ML
25 INJECTION INTRAMUSCULAR; INTRAVENOUS; SUBCUTANEOUS
OUTPATIENT
Start: 2025-02-19

## 2025-02-17 RX ORDER — EPINEPHRINE 1 MG/ML
0.3 INJECTION, SOLUTION INTRAMUSCULAR; SUBCUTANEOUS EVERY 5 MIN PRN
OUTPATIENT
Start: 2025-02-19

## 2025-02-17 RX ADMIN — IRON SUCROSE 200 MG: 20 INJECTION, SOLUTION INTRAVENOUS at 08:51

## 2025-02-17 RX ADMIN — SODIUM CHLORIDE 250 ML: 9 INJECTION, SOLUTION INTRAVENOUS at 08:49

## 2025-02-17 NOTE — PROGRESS NOTES
Infusion Nursing Note:  Rakel Shaw presents today for Venofer 200 mg 1 of 5.    Patient seen by provider today: No   present during visit today: Not Applicable.    Note: N/A.      Intravenous Access:  Peripheral IV placed.    Treatment Conditions:  Not Applicable.      Post Infusion Assessment:  Patient tolerated infusion without incident.  Patient observed for 15 minutes post Venofer 200 mg per protocol.  Blood return noted pre and post infusion.  Site patent and intact, free from redness, edema or discomfort.  No evidence of extravasations.  Access discontinued per protocol.       Discharge Plan:   Patient declined prescription refills.  Discharge instructions reviewed with: Patient.  Patient and/or family verbalized understanding of discharge instructions and all questions answered.  AVS to patient via [employer].  Patient will return 2/25 for next appointment.   Patient discharged in stable condition accompanied by: self.  Departure Mode: Ambulatory.      Sen Asher RN

## 2025-02-18 LAB — SCANNED LAB RESULT: NORMAL

## 2025-03-04 ENCOUNTER — INFUSION THERAPY VISIT (OUTPATIENT)
Dept: INFUSION THERAPY | Facility: CLINIC | Age: 35
End: 2025-03-04
Payer: COMMERCIAL

## 2025-03-04 VITALS
TEMPERATURE: 98.3 F | OXYGEN SATURATION: 98 % | RESPIRATION RATE: 16 BRPM | SYSTOLIC BLOOD PRESSURE: 118 MMHG | HEART RATE: 95 BPM | DIASTOLIC BLOOD PRESSURE: 71 MMHG

## 2025-03-04 DIAGNOSIS — D50.8 IRON DEFICIENCY ANEMIA SECONDARY TO INADEQUATE DIETARY IRON INTAKE: Primary | ICD-10-CM

## 2025-03-04 DIAGNOSIS — Z86.718 PERSONAL HISTORY OF DVT (DEEP VEIN THROMBOSIS): ICD-10-CM

## 2025-03-04 PROCEDURE — 96374 THER/PROPH/DIAG INJ IV PUSH: CPT

## 2025-03-04 PROCEDURE — 250N000011 HC RX IP 250 OP 636: Performed by: OBSTETRICS & GYNECOLOGY

## 2025-03-04 PROCEDURE — 258N000003 HC RX IP 258 OP 636: Performed by: OBSTETRICS & GYNECOLOGY

## 2025-03-04 RX ORDER — ENOXAPARIN SODIUM 100 MG/ML
40 INJECTION SUBCUTANEOUS DAILY
Qty: 36 ML | Refills: 2 | Status: SHIPPED | OUTPATIENT
Start: 2025-03-04

## 2025-03-04 RX ORDER — EPINEPHRINE 1 MG/ML
0.3 INJECTION, SOLUTION INTRAMUSCULAR; SUBCUTANEOUS EVERY 5 MIN PRN
OUTPATIENT
Start: 2025-03-06

## 2025-03-04 RX ORDER — MEPERIDINE HYDROCHLORIDE 25 MG/ML
25 INJECTION INTRAMUSCULAR; INTRAVENOUS; SUBCUTANEOUS
OUTPATIENT
Start: 2025-03-06

## 2025-03-04 RX ORDER — HEPARIN SODIUM (PORCINE) LOCK FLUSH IV SOLN 100 UNIT/ML 100 UNIT/ML
5 SOLUTION INTRAVENOUS
OUTPATIENT
Start: 2025-03-06

## 2025-03-04 RX ORDER — DIPHENHYDRAMINE HYDROCHLORIDE 50 MG/ML
25 INJECTION, SOLUTION INTRAMUSCULAR; INTRAVENOUS
Start: 2025-03-06

## 2025-03-04 RX ORDER — ALBUTEROL SULFATE 0.83 MG/ML
2.5 SOLUTION RESPIRATORY (INHALATION)
OUTPATIENT
Start: 2025-03-06

## 2025-03-04 RX ORDER — HEPARIN SODIUM,PORCINE 10 UNIT/ML
5-20 VIAL (ML) INTRAVENOUS DAILY PRN
OUTPATIENT
Start: 2025-03-06

## 2025-03-04 RX ORDER — ALBUTEROL SULFATE 90 UG/1
1-2 INHALANT RESPIRATORY (INHALATION)
Start: 2025-03-06

## 2025-03-04 RX ORDER — DIPHENHYDRAMINE HYDROCHLORIDE 50 MG/ML
50 INJECTION, SOLUTION INTRAMUSCULAR; INTRAVENOUS
Start: 2025-03-06

## 2025-03-04 RX ORDER — METHYLPREDNISOLONE SODIUM SUCCINATE 40 MG/ML
40 INJECTION INTRAMUSCULAR; INTRAVENOUS
Start: 2025-03-06

## 2025-03-04 RX ADMIN — IRON SUCROSE 200 MG: 20 INJECTION, SOLUTION INTRAVENOUS at 15:26

## 2025-03-04 RX ADMIN — SODIUM CHLORIDE 250 ML: 0.9 INJECTION, SOLUTION INTRAVENOUS at 15:25

## 2025-03-04 ASSESSMENT — PAIN SCALES - GENERAL: PAINLEVEL_OUTOF10: MODERATE PAIN (6)

## 2025-03-04 NOTE — PROGRESS NOTES
Infusion Nursing Note:  Rakel Shaw presents today for venofer.    Patient seen by provider today: No   present during visit today: Not Applicable.    Note: Patient reports no new concerns since last venofer infusion on 2/28. Reports improvement in fatigue and dizziness.      Intravenous Access:  Peripheral IV placed.    Treatment Conditions:  Not Applicable.      Post Infusion Assessment:  Patient tolerated infusion without incident.  Patient observed for 15 minutes post venofer per protocol.  Blood return noted pre and post infusion.  Site patent and intact, free from redness, edema or discomfort.  No evidence of extravasations.  Access discontinued per protocol.       Discharge Plan:   Discharge instructions reviewed with: Patient.  Patient and/or family verbalized understanding of discharge instructions and all questions answered.  AVS to patient via [employer].  Patient will return 3/6/25 for next appointment.   Patient discharged in stable condition accompanied by: self.  Departure Mode: Ambulatory.      Marce Troncoso RN

## 2025-03-04 NOTE — TELEPHONE ENCOUNTER
Last Written Prescription Date:  01/31/2025  Last Fill Quantity: 40ml ,  # refills: 2   Last office visit: 6/29/2023 ; last virtual visit: 1/29/2025 with prescribing provider:  lj   Future Office Visit: 03/4/2025  Next 5 appointments (look out 90 days)      Mar 21, 2025 8:00 AM  (Arrive by 7:45 AM)  Return OB Visit with Sara Vazquez MD  [facility] ([facility] ) [address] [place] [id number] [phone]             Requested Prescriptions   Pending Prescriptions Disp Refills    enoxaparin ANTICOAGULANT (LOVENOX) 40 MG/0.4ML syringe 40 mL 2     Sig: Inject 0.4 mLs (40 mg) subcutaneously daily.       There is no refill protocol information for this order

## 2025-03-13 ENCOUNTER — INFUSION THERAPY VISIT (OUTPATIENT)
Dept: INFUSION THERAPY | Facility: CLINIC | Age: 35
End: 2025-03-13
Attending: OBSTETRICS & GYNECOLOGY
Payer: COMMERCIAL

## 2025-03-13 VITALS
HEART RATE: 89 BPM | OXYGEN SATURATION: 100 % | SYSTOLIC BLOOD PRESSURE: 108 MMHG | TEMPERATURE: 97.5 F | RESPIRATION RATE: 18 BRPM | DIASTOLIC BLOOD PRESSURE: 72 MMHG

## 2025-03-13 DIAGNOSIS — D50.8 IRON DEFICIENCY ANEMIA SECONDARY TO INADEQUATE DIETARY IRON INTAKE: Primary | ICD-10-CM

## 2025-03-13 PROCEDURE — 250N000011 HC RX IP 250 OP 636: Performed by: OBSTETRICS & GYNECOLOGY

## 2025-03-13 PROCEDURE — 258N000003 HC RX IP 258 OP 636: Performed by: OBSTETRICS & GYNECOLOGY

## 2025-03-13 RX ORDER — DIPHENHYDRAMINE HYDROCHLORIDE 50 MG/ML
25 INJECTION, SOLUTION INTRAMUSCULAR; INTRAVENOUS
Start: 2025-03-14

## 2025-03-13 RX ORDER — EPINEPHRINE 1 MG/ML
0.3 INJECTION, SOLUTION INTRAMUSCULAR; SUBCUTANEOUS EVERY 5 MIN PRN
OUTPATIENT
Start: 2025-03-14

## 2025-03-13 RX ORDER — MEPERIDINE HYDROCHLORIDE 25 MG/ML
25 INJECTION INTRAMUSCULAR; INTRAVENOUS; SUBCUTANEOUS
OUTPATIENT
Start: 2025-03-14

## 2025-03-13 RX ORDER — METHYLPREDNISOLONE SODIUM SUCCINATE 40 MG/ML
40 INJECTION INTRAMUSCULAR; INTRAVENOUS
Start: 2025-03-14

## 2025-03-13 RX ORDER — DIPHENHYDRAMINE HYDROCHLORIDE 50 MG/ML
50 INJECTION, SOLUTION INTRAMUSCULAR; INTRAVENOUS
Start: 2025-03-14

## 2025-03-13 RX ORDER — HEPARIN SODIUM,PORCINE 10 UNIT/ML
5-20 VIAL (ML) INTRAVENOUS DAILY PRN
OUTPATIENT
Start: 2025-03-14

## 2025-03-13 RX ORDER — ALBUTEROL SULFATE 90 UG/1
1-2 INHALANT RESPIRATORY (INHALATION)
Start: 2025-03-14

## 2025-03-13 RX ORDER — HEPARIN SODIUM (PORCINE) LOCK FLUSH IV SOLN 100 UNIT/ML 100 UNIT/ML
5 SOLUTION INTRAVENOUS
OUTPATIENT
Start: 2025-03-14

## 2025-03-13 RX ORDER — ALBUTEROL SULFATE 0.83 MG/ML
2.5 SOLUTION RESPIRATORY (INHALATION)
OUTPATIENT
Start: 2025-03-14

## 2025-03-13 RX ADMIN — IRON SUCROSE 200 MG: 20 INJECTION, SOLUTION INTRAVENOUS at 16:04

## 2025-03-13 RX ADMIN — SODIUM CHLORIDE 250 ML: 0.9 INJECTION, SOLUTION INTRAVENOUS at 16:10

## 2025-03-13 NOTE — PROGRESS NOTES
Infusion Nursing Note:  Rakel Shaw presents today for venofer 200.    Patient seen by provider today: No   present during visit today: Not Applicable.    Note: N/A.      Intravenous Access:  Peripheral IV placed.    Treatment Conditions:  Not Applicable.      Post Infusion Assessment:  Patient tolerated infusion without incident.  Patient observed for 15 minutes post venofer per protocol.  Blood return noted pre and post infusion.  Site patent and intact, free from redness, edema or discomfort.  No evidence of extravasations.  Access discontinued per protocol.       Discharge Plan:   Discharge instructions reviewed with: Patient.  Patient and/or family verbalized understanding of discharge instructions and all questions answered.  AVS to patient via [employer].  Patient will return when scheduled for next appointment.   Patient discharged in stable condition accompanied by: self.  Departure Mode: Ambulatory.      Aidee Yap RN

## 2025-04-07 ENCOUNTER — OFFICE VISIT (OUTPATIENT)
Dept: MATERNAL FETAL MEDICINE | Facility: CLINIC | Age: 35
End: 2025-04-07
Attending: OBSTETRICS & GYNECOLOGY
Payer: COMMERCIAL

## 2025-04-07 ENCOUNTER — HOSPITAL ENCOUNTER (OUTPATIENT)
Dept: ULTRASOUND IMAGING | Facility: CLINIC | Age: 35
Discharge: HOME | End: 2025-04-07
Attending: OBSTETRICS & GYNECOLOGY
Payer: COMMERCIAL

## 2025-04-07 DIAGNOSIS — O26.90 PREGNANCY RELATED CONDITION, ANTEPARTUM: ICD-10-CM

## 2025-04-07 DIAGNOSIS — Z36.89 ENCOUNTER FOR FETAL ANATOMIC SURVEY: ICD-10-CM

## 2025-04-07 DIAGNOSIS — O26.92 PREGNANCY RELATED CONDITION IN SECOND TRIMESTER: Primary | ICD-10-CM

## 2025-04-07 PROCEDURE — 76811 OB US DETAILED SNGL FETUS: CPT

## 2025-04-07 NOTE — PROGRESS NOTES
The patient was seen for an ultrasound in the [facility] today.  For a detailed report of the ultrasound examination, please see the ultrasound report which can be found under the imaging tab.    If you have questions regarding today's evaluation or if we can be of further service, please contact the [facility].    Renata Cedillo MD  , OB/GYN  Maternal-Fetal Medicine

## 2025-04-07 NOTE — NURSING NOTE
Patient presents to [facility] for L2 at 18w3d due to Hx DVT. Positive fetal movement. Denies LOF, vaginal bleeding or cramping/contractions. SBAR given to [facility], see their note in Epic.

## 2025-04-29 ENCOUNTER — HOSPITAL ENCOUNTER (OUTPATIENT)
Dept: ULTRASOUND IMAGING | Facility: CLINIC | Age: 35
Discharge: HOME | End: 2025-04-29
Attending: OBSTETRICS & GYNECOLOGY
Payer: COMMERCIAL

## 2025-04-29 ENCOUNTER — OFFICE VISIT (OUTPATIENT)
Dept: MATERNAL FETAL MEDICINE | Facility: CLINIC | Age: 35
End: 2025-04-29
Attending: OBSTETRICS & GYNECOLOGY
Payer: COMMERCIAL

## 2025-04-29 DIAGNOSIS — O09.92 SUPERVISION OF HIGH RISK PREGNANCY IN SECOND TRIMESTER: Primary | ICD-10-CM

## 2025-04-29 PROCEDURE — 76816 OB US FOLLOW-UP PER FETUS: CPT

## 2025-04-29 NOTE — NURSING NOTE
Rakel presents to [facility] for follow up ultrasound due to suboptimal anatomy. Patient reports positive fetal movement,  denies contractions, leaking of fluid, or bleeding.  SBAR given to M MD, see their note in Epic.

## 2025-04-29 NOTE — PROGRESS NOTES
Please see full imaging report from [employer] program under imaging tab.    Seth Fernandez MD  Maternal Fetal Medicine

## 2025-05-11 ENCOUNTER — HOSPITAL ENCOUNTER (OUTPATIENT)
Facility: CLINIC | Age: 35
End: 2025-05-11
Admitting: OBSTETRICS & GYNECOLOGY
Payer: COMMERCIAL

## 2025-05-11 ENCOUNTER — NURSE TRIAGE (OUTPATIENT)
Dept: NURSING | Facility: CLINIC | Age: 35
End: 2025-05-11
Payer: COMMERCIAL

## 2025-05-11 ENCOUNTER — HOSPITAL ENCOUNTER (OUTPATIENT)
Facility: CLINIC | Age: 35
Discharge: HOME | End: 2025-05-11
Attending: OBSTETRICS & GYNECOLOGY | Admitting: OBSTETRICS & GYNECOLOGY
Payer: COMMERCIAL

## 2025-05-11 VITALS
RESPIRATION RATE: 16 BRPM | WEIGHT: 189 LBS | BODY MASS INDEX: 29.66 KG/M2 | SYSTOLIC BLOOD PRESSURE: 119 MMHG | DIASTOLIC BLOOD PRESSURE: 71 MMHG | TEMPERATURE: 97.4 F | HEART RATE: 93 BPM | HEIGHT: 67 IN

## 2025-05-11 DIAGNOSIS — O09.92 SUPERVISION OF HIGH RISK PREGNANCY IN SECOND TRIMESTER: Primary | ICD-10-CM

## 2025-05-11 PROBLEM — Z36.89 ENCOUNTER FOR TRIAGE IN PREGNANT PATIENT: Status: ACTIVE | Noted: 2025-05-11

## 2025-05-11 LAB
ALBUMIN MFR UR ELPH: 24.5 MG/DL
ALBUMIN SERPL BCG-MCNC: 3.2 G/DL (ref 3.5–5.2)
ALP SERPL-CCNC: 56 U/L (ref 40–150)
ALT SERPL W P-5'-P-CCNC: 6 U/L (ref 0–50)
ANION GAP SERPL CALCULATED.3IONS-SCNC: 13 MMOL/L (ref 7–15)
AST SERPL W P-5'-P-CCNC: 9 U/L (ref 0–45)
BILIRUB SERPL-MCNC: 0.2 MG/DL
BUN SERPL-MCNC: 9.9 MG/DL (ref 6–20)
CALCIUM SERPL-MCNC: 9.3 MG/DL (ref 8.8–10.4)
CHLORIDE SERPL-SCNC: 103 MMOL/L (ref 98–107)
CREAT SERPL-MCNC: 0.61 MG/DL (ref 0.51–0.95)
CREAT UR-MCNC: 214.6 MG/DL
EGFRCR SERPLBLD CKD-EPI 2021: >90 ML/MIN/1.73M2
ERYTHROCYTE [DISTWIDTH] IN BLOOD BY AUTOMATED COUNT: 14.7 % (ref 10–15)
GLUCOSE SERPL-MCNC: 106 MG/DL (ref 70–99)
HCO3 SERPL-SCNC: 22 MMOL/L (ref 22–29)
HCT VFR BLD AUTO: 35.3 % (ref 35–47)
HGB BLD-MCNC: 12.4 G/DL (ref 11.7–15.7)
HOLD SPECIMEN: NORMAL
HOLD SPECIMEN: NORMAL
MCH RBC QN AUTO: 28.4 PG (ref 26.5–33)
MCHC RBC AUTO-ENTMCNC: 35.1 G/DL (ref 31.5–36.5)
MCV RBC AUTO: 81 FL (ref 78–100)
PLATELET # BLD AUTO: 276 10E3/UL (ref 150–450)
POTASSIUM SERPL-SCNC: 3.6 MMOL/L (ref 3.4–5.3)
PROT SERPL-MCNC: 6.3 G/DL (ref 6.4–8.3)
PROT/CREAT 24H UR: 0.11 MG/MG CR (ref 0–0.2)
RBC # BLD AUTO: 4.36 10E6/UL (ref 3.8–5.2)
SODIUM SERPL-SCNC: 138 MMOL/L (ref 135–145)
WBC # BLD AUTO: 11.7 10E3/UL (ref 4–11)

## 2025-05-11 PROCEDURE — 96372 THER/PROPH/DIAG INJ SC/IM: CPT | Performed by: OBSTETRICS & GYNECOLOGY

## 2025-05-11 PROCEDURE — 85041 AUTOMATED RBC COUNT: CPT | Performed by: OBSTETRICS & GYNECOLOGY

## 2025-05-11 PROCEDURE — 250N000011 HC RX IP 250 OP 636: Performed by: OBSTETRICS & GYNECOLOGY

## 2025-05-11 PROCEDURE — 84156 ASSAY OF PROTEIN URINE: CPT | Performed by: OBSTETRICS & GYNECOLOGY

## 2025-05-11 PROCEDURE — 36415 COLL VENOUS BLD VENIPUNCTURE: CPT | Performed by: OBSTETRICS & GYNECOLOGY

## 2025-05-11 PROCEDURE — 80053 COMPREHEN METABOLIC PANEL: CPT | Performed by: OBSTETRICS & GYNECOLOGY

## 2025-05-11 PROCEDURE — G0463 HOSPITAL OUTPT CLINIC VISIT: HCPCS

## 2025-05-11 PROCEDURE — 250N000013 HC RX MED GY IP 250 OP 250 PS 637: Performed by: OBSTETRICS & GYNECOLOGY

## 2025-05-11 RX ORDER — LIDOCAINE 40 MG/G
CREAM TOPICAL
Status: DISCONTINUED | OUTPATIENT
Start: 2025-05-11 | End: 2025-05-12 | Stop reason: HOSPADM

## 2025-05-11 RX ORDER — ENOXAPARIN SODIUM 100 MG/ML
40 INJECTION SUBCUTANEOUS ONCE
Status: COMPLETED | OUTPATIENT
Start: 2025-05-11 | End: 2025-05-11

## 2025-05-11 RX ORDER — METOCLOPRAMIDE 10 MG/1
10 TABLET ORAL ONCE
Status: COMPLETED | OUTPATIENT
Start: 2025-05-11 | End: 2025-05-11

## 2025-05-11 RX ORDER — ACETAMINOPHEN 325 MG/1
TABLET ORAL
Status: COMPLETED
Start: 2025-05-11 | End: 2025-05-11

## 2025-05-11 RX ORDER — ACETAMINOPHEN 325 MG/1
650 TABLET ORAL ONCE
Status: COMPLETED | OUTPATIENT
Start: 2025-05-11 | End: 2025-05-11

## 2025-05-11 RX ADMIN — ACETAMINOPHEN 650 MG: 325 TABLET ORAL at 22:15

## 2025-05-11 RX ADMIN — METOCLOPRAMIDE 10 MG: 10 TABLET ORAL at 22:15

## 2025-05-11 RX ADMIN — ACETAMINOPHEN 650 MG: 325 TABLET, FILM COATED ORAL at 22:15

## 2025-05-11 RX ADMIN — ENOXAPARIN SODIUM 40 MG: 40 INJECTION SUBCUTANEOUS at 22:15

## 2025-05-11 ASSESSMENT — ACTIVITIES OF DAILY LIVING (ADL)
ADLS_ACUITY_SCORE: 36
ADLS_ACUITY_SCORE: 36

## 2025-05-11 NOTE — TELEPHONE ENCOUNTER
"OB Triage Call      Is patient's OB/Midwife with the [facility] or [facility]? [facility]- Proceed with triage     Reason for call:  Headache.     Assessment:  Per pt, she has a headache since 10 AM today that is not being  relieved by Tylenol. Per pt, headache pain is at 5/10, which per pt is severe for her. Per pt, she also has pressure behind both eyes. Per pt, she is concerned for preeclampsia, as she has a history of preeclampsia with prior pregnancy. Per pt, her BP prior to calling [facility] was /86.    Plan:  Go to  Now.     Patient plans to deliver at [facility]    Patient's primary OB Provider is Sara Vazquez. .      Per protocol recommendations Patient to be evaluated in L&D. Patient's primary OB is [facility].  Labor and delivery at [facility] ([phone]) notified of patient's pending arrival.  Report given to Jocelyn.      Is patient's delivering hospital on divert? No      23w2d    Estimated Date of Delivery: Sep 5, 2025        OB History    Para Term  AB Living   2 1 1 0 0 1   SAB IAB Ectopic Multiple Live Births   0 0 0 0 1      # Outcome Date GA Lbr Mike/2nd Weight Sex Type Anes PTL Lv   2 Current            1 Term 23 37w1d 01:55 / 04:00 3.32 kg (7 lb 5.1 oz) F Vag-Vacuum EPI N AARON      Complications: Preeclampsia/Hypertension, Shoulder dystocia during labor and delivery      Name: Florentin      Apgar1: 7  Apgar5: 9       No results found for: \"GBS\"       Daren Mae RN   Reason for Disposition   [1] Pregnant 20 or more weeks AND [2] SEVERE headache (e.g., excruciating) AND [3] not improved after 2 hours of pain medicine    Additional Information   Negative: Difficult to awaken or acting confused (e.g., disoriented, slurred speech)   Negative: [1] Weakness of the face, arm or leg on one side of the body AND [2] new-onset   Negative: [1] Numbness of the face, arm or leg on one side of the body AND [2] new-onset   Negative: [1] Loss of speech or garbled speech AND [2] " "new-onset   Negative: Sounds like a life-threatening emergency to the triager   Negative: Followed a head injury within last 3 days   Negative: Traumatic Brain Injury (TBI) is suspected   Negative: Sinus pain of forehead and yellow or green nasal discharge   Negative: Severe pain in one eye   Negative: Unable to walk, or can only walk with assistance (e.g., requires support)   Negative: Stiff neck (can't touch chin to chest)   Negative: [1] Other family members (or people in same household) with headaches AND [2] possibility of carbon monoxide exposure   Negative: [1] SEVERE headache (e.g., excruciating) AND [2] having contractions or other symptoms of labor   Negative: [1] Pregnant 20 or more weeks AND [2] Systolic BP >= 140 OR Diastolic BP >= 90   Negative: [1] SEVERE headache (e.g., excruciating) AND [2] \"worst headache\" of life   Negative: [1] SEVERE headache AND [2] sudden-onset (i.e., reaching maximum intensity within seconds to 1 hour)   Negative: [1] SEVERE headache AND [2] fever   Negative: Loss of vision or double vision  (Exception: Similar to previous migraines.)   Negative: [1] Fever > 100.0 F (37.8 C) AND [2] diabetes mellitus or weak immune system (e.g., HIV positive, cancer chemo, splenectomy, organ transplant, chronic steroids)   Negative: Patient sounds very sick or weak to the triager    Protocols used: Pregnancy - Headache-A-AH    "

## 2025-05-12 NOTE — DISCHARGE INSTRUCTIONS
Learning About When to Call Your Doctor During Pregnancy (After 20 Weeks)  Overview  It's common to have concerns about what might be a problem when you're pregnant. Most pregnancies don't have any serious problems. But it's still important to know when to call your doctor if you have certain symptoms or signs of labor.  These are general suggestions. Your doctor may give you some more information about when to call.  When to call your doctor (after 20 weeks)  Call 911  anytime you think you may need emergency care. For example, call if:  You have severe vaginal bleeding. This means you are soaking through a pad each hour for 2 or more hours.  You have sudden, severe pain in your belly.  You have chest pain, are short of breath, or cough up blood.  You passed out (lost consciousness).  You have a seizure.  You see or feel the umbilical cord.  You think you are about to deliver your baby and can't make it safely to the hospital or birthing center.  Call your doctor now or seek immediate medical care if:  You have vaginal bleeding.  You have belly pain.  You have a fever.  You are dizzy or lightheaded, or you feel like you may faint.  You have signs of a blood clot in your leg (called a deep vein thrombosis), such as:  Pain in the calf, back of the knee, thigh, or groin.  Swelling in your leg or groin.  A color change on the leg or groin. The skin may be reddish or purplish, depending on your usual skin color.  You have symptoms of preeclampsia, such as:  Sudden swelling of your face, hands, or feet.  New vision problems (such as dimness, blurring, or seeing spots).  A severe headache.  You have a sudden release of fluid from your vagina. (You think your water broke.)  You've been having regular contractions for an hour. This means that you've had at least 6 contractions within 1 hour, even after you change your position and drink fluids.  You notice that your baby has stopped moving or is moving less than  "normal.  You have signs of heart failure, such as:  New or increased shortness of breath.  New or worse swelling in your legs, ankles, or feet.  Sudden weight gain, such as more than 2 to 3 pounds in a day or 5 pounds in a week.  Feeling so tired or weak that you cannot do your usual activities.  You have symptoms of a urinary tract infection. These may include:  Pain or burning when you urinate.  A frequent need to urinate without being able to pass much urine.  Pain in the flank, which is just below the rib cage and above the waist on either side of the back.  Blood in your urine.  Watch closely for changes in your health, and be sure to contact your doctor if:  You have vaginal discharge that smells bad.  You feel sad, anxious, or hopeless for more than a few days.  You have skin changes, such as a rash, itching, or a yellow color to your skin.  You have other concerns about your pregnancy.  If you have labor signs at 37 weeks or more  If you have signs of labor at 37 weeks or more, your doctor may tell you to call when your labor becomes more active. Symptoms of active labor include:  Contractions that are regular.  Contractions that are less than 5 minutes apart.  Contractions that are hard to talk through.  Follow-up care is a key part of your treatment and safety. Be sure to make and go to all appointments, and call your doctor if you are having problems. It's also a good idea to know your test results and keep a list of the medicines you take.  Where can you learn more?  Go to https://www.[employer].net/patiented  Enter N531 in the search box to learn more about \"Learning About When to Call Your Doctor During Pregnancy (After 20 Weeks).\"  Current as of: April 30, 2024  Content Version: 14.4    [phone] [facility] [employer].   Care instructions adapted under license by your healthcare professional. If you have questions about a medical condition or this instruction, always ask your healthcare professional. " [employer], [facility] disclaims any warranty or liability for your use of this information.

## 2025-05-12 NOTE — PLAN OF CARE
Data: Patient assessed in the [facility] for pre-eclampsia rule out. Cervical exam deferred. Membranes intact. Contractions are not present. See flowsheets for fetal assessment documentation.     Action: Presumed adequate fetal oxygenation documented. Discharge instructions reviewed. Patient instructed to report change in fetal movement, vaginal leaking of fluid or bleeding, abdominal pain, or any concerns related to the pregnancy to provider/clinic.      Response: Orders to discharge home per Marielena Hughes. Patient verbalized understanding of education and agreement with plan. Discharged to home at 22:20.

## 2025-05-12 NOTE — PROVIDER NOTIFICATION
05/11/25 2100   Provider Notification   Provider Name/Title Dr. Peguero   Method of Notification Electronic Page   Notification Reason Patient Arrived     Dr. Peguero responded to page. Notified her of the pt's arrival, OB/medical history, primary concern,FHTs, uterine activity and pain rating. Dr. Peguero stated she would place orders for lab work to further evaluate for pre-eclampsia. Will contact Dr. Peguero with lab results once they are available.

## 2025-05-12 NOTE — PROVIDER NOTIFICATION
05/11/25 2201   Provider Notification   Provider Name/Title Dr. Moulton   Method of Notification Electronic Page   Notification Reason Lab/Diagnostic Study     Dr. Peguero responded to page. Reviewed lab results with her. Received orders to give the pt her Lovenox and offer her Tylenol and Reglan and then the pt can be discharged to home.

## 2025-05-12 NOTE — PLAN OF CARE
Data: [facility]: 2025  8:35 PM.  Reason for maternal/fetal assessment is headache and pressure behind her eyes. Patient reports feeling a headache since 10:00am today that have not decreased with the use of Tylenol along with mild pressure behind both eyes. Pt states she has a history of pre-eclampsia and wanted to be checked out to be sure everything is normal. Patient denies uterine contractions, leaking of vaginal fluid/rupture of membranes, vaginal bleeding, abdominal pain, pelvic pressure, visual disturbances, epigastric or RUQ pain, significant edema. Patient reports fetal movement is normal. Patient is a 23w2d .  Prenatal record reviewed. Pregnancy has been complicated by history of DVT and currently on Lovenox.    Vital signs wnl. Support person is present.     Action: Verbal consent for EFM. Triage assessment completed.     Response: Patient verbalized agreement with plan. Will contact Dr. Peguero with update and further orders.

## 2025-05-31 ENCOUNTER — HEALTH MAINTENANCE LETTER (OUTPATIENT)
Age: 35
End: 2025-05-31

## 2025-06-18 ENCOUNTER — LAB (OUTPATIENT)
Dept: LAB | Facility: CLINIC | Age: 35
End: 2025-06-18
Payer: COMMERCIAL

## 2025-06-18 ENCOUNTER — RESULTS FOLLOW-UP (OUTPATIENT)
Dept: OBGYN | Facility: CLINIC | Age: 35
End: 2025-06-18

## 2025-06-18 ENCOUNTER — PRENATAL OFFICE VISIT (OUTPATIENT)
Dept: OBGYN | Facility: CLINIC | Age: 35
End: 2025-06-18
Payer: COMMERCIAL

## 2025-06-18 VITALS
WEIGHT: 205 LBS | BODY MASS INDEX: 32.18 KG/M2 | DIASTOLIC BLOOD PRESSURE: 54 MMHG | HEIGHT: 67 IN | SYSTOLIC BLOOD PRESSURE: 104 MMHG

## 2025-06-18 DIAGNOSIS — R10.2 PELVIC PAIN AFFECTING PREGNANCY IN THIRD TRIMESTER, ANTEPARTUM: ICD-10-CM

## 2025-06-18 DIAGNOSIS — Z3A.28 28 WEEKS GESTATION OF PREGNANCY: ICD-10-CM

## 2025-06-18 DIAGNOSIS — D50.8 IRON DEFICIENCY ANEMIA SECONDARY TO INADEQUATE DIETARY IRON INTAKE: ICD-10-CM

## 2025-06-18 DIAGNOSIS — Z23 NEED FOR DIPHTHERIA-TETANUS-PERTUSSIS (TDAP) VACCINE: ICD-10-CM

## 2025-06-18 DIAGNOSIS — O09.93 SUPERVISION OF HIGH RISK PREGNANCY IN THIRD TRIMESTER: Primary | ICD-10-CM

## 2025-06-18 DIAGNOSIS — O26.893 PELVIC PAIN AFFECTING PREGNANCY IN THIRD TRIMESTER, ANTEPARTUM: ICD-10-CM

## 2025-06-18 DIAGNOSIS — Z36.9 ENCOUNTER FOR ANTENATAL SCREENING OF MOTHER: ICD-10-CM

## 2025-06-18 LAB
ERYTHROCYTE [DISTWIDTH] IN BLOOD BY AUTOMATED COUNT: 13.6 % (ref 10–15)
GLUCOSE 1H P 50 G GLC PO SERPL-MCNC: 93 MG/DL (ref 70–129)
HCT VFR BLD AUTO: 37.7 % (ref 35–47)
HGB BLD-MCNC: 12.4 G/DL (ref 11.7–15.7)
MCH RBC QN AUTO: 28.6 PG (ref 26.5–33)
MCHC RBC AUTO-ENTMCNC: 32.9 G/DL (ref 31.5–36.5)
MCV RBC AUTO: 87 FL (ref 78–100)
PLATELET # BLD AUTO: 294 10E3/UL (ref 150–450)
RBC # BLD AUTO: 4.34 10E6/UL (ref 3.8–5.2)
WBC # BLD AUTO: 12.4 10E3/UL (ref 4–11)

## 2025-06-18 PROCEDURE — 3078F DIAST BP <80 MM HG: CPT | Performed by: OBSTETRICS & GYNECOLOGY

## 2025-06-18 PROCEDURE — 36415 COLL VENOUS BLD VENIPUNCTURE: CPT

## 2025-06-18 PROCEDURE — 85027 COMPLETE CBC AUTOMATED: CPT

## 2025-06-18 PROCEDURE — 0502F SUBSEQUENT PRENATAL CARE: CPT | Performed by: OBSTETRICS & GYNECOLOGY

## 2025-06-18 PROCEDURE — 99207 PR PRENATAL VISIT: CPT | Performed by: OBSTETRICS & GYNECOLOGY

## 2025-06-18 PROCEDURE — 82950 GLUCOSE TEST: CPT

## 2025-06-18 PROCEDURE — 3074F SYST BP LT 130 MM HG: CPT | Performed by: OBSTETRICS & GYNECOLOGY

## 2025-06-18 PROCEDURE — 90471 IMMUNIZATION ADMIN: CPT | Performed by: OBSTETRICS & GYNECOLOGY

## 2025-06-18 PROCEDURE — 90715 TDAP VACCINE 7 YRS/> IM: CPT | Performed by: OBSTETRICS & GYNECOLOGY

## 2025-06-18 NOTE — PROGRESS NOTES
Rakel Shaw is a 34 year old  at 28w5d     She had no complaints   Denies LOF, VB, ctx. +FM.    Notes more pelvic pressure. No pain. Denies dysuria.       ICD-10-CM    1. Supervision of high risk pregnancy in third trimester  O09.93       2. Iron deficiency anemia secondary to inadequate dietary iron intake  D50.8       3. Need for diphtheria-tetanus-pertussis (Tdap) vaccine  Z23 TDAP 7+ (ADACEL,BOOSTRIX)      4. Pelvic pain affecting pregnancy in third trimester, antepartum  O26.893 Physical Therapy  Referral    R10.2       5. 28 weeks gestation of pregnancy  Z3A.28         - MFM consult note:              1. Continue Lovenox through 6 weeks PP              2. Growth US @ 36 weeks to discuss route of delivery given history of shoulder dystocia   - First trimester labs WNL except for anemia. Baseline PreE labs WNL  - Anemia: IV iron (last dose ). Plan recheck 6 weeks after last infusion.  - NIPT neg/XY; AFP 3/21  - Level 2 ():  - Pre-eclampsia prevention:              81mg aspirin started at 12 weeks   - 3rd tri labs:   - GBS due at 36 weeks  - Reviewed Labor and PTL precautions, expected fetal movement and kick counts, PreE signs/symptoms  - TW lbs. Pregravid BMI 29 Expect 15-25 lbs.  - follow-up in 2 weeks     Sara Vazquez MD

## 2025-07-10 ENCOUNTER — PRENATAL OFFICE VISIT (OUTPATIENT)
Dept: OBGYN | Facility: CLINIC | Age: 35
End: 2025-07-10
Payer: COMMERCIAL

## 2025-07-10 VITALS
DIASTOLIC BLOOD PRESSURE: 58 MMHG | WEIGHT: 208.6 LBS | BODY MASS INDEX: 32.74 KG/M2 | SYSTOLIC BLOOD PRESSURE: 100 MMHG | HEIGHT: 67 IN

## 2025-07-10 DIAGNOSIS — O09.299 HX OF PRE-ECLAMPSIA IN PRIOR PREGNANCY, CURRENTLY PREGNANT: ICD-10-CM

## 2025-07-10 DIAGNOSIS — Z3A.31 31 WEEKS GESTATION OF PREGNANCY: ICD-10-CM

## 2025-07-10 DIAGNOSIS — Z86.718 PERSONAL HISTORY OF DVT (DEEP VEIN THROMBOSIS): ICD-10-CM

## 2025-07-10 DIAGNOSIS — D50.8 IRON DEFICIENCY ANEMIA SECONDARY TO INADEQUATE DIETARY IRON INTAKE: ICD-10-CM

## 2025-07-10 DIAGNOSIS — O09.93 SUPERVISION OF HIGH RISK PREGNANCY IN THIRD TRIMESTER: Primary | ICD-10-CM

## 2025-07-10 PROBLEM — R74.8 ELEVATED CK: Status: ACTIVE | Noted: 2024-04-25

## 2025-07-10 PROBLEM — E55.9 VITAMIN D DEFICIENCY: Status: ACTIVE | Noted: 2018-02-15

## 2025-07-10 PROBLEM — D24.2 FIBROADENOMA OF LEFT BREAST IN FEMALE: Status: ACTIVE | Noted: 2019-03-01

## 2025-07-10 PROBLEM — F41.9 ANXIETY: Status: ACTIVE | Noted: 2022-08-29

## 2025-07-10 PROBLEM — E61.1 IRON DEFICIENCY: Status: ACTIVE | Noted: 2018-02-15

## 2025-07-10 NOTE — PROGRESS NOTES
"Rakel Shaw is a 35 year old  at 31w6d by LMP c/w 8 wk US here today for routine OB visit.    Doing well and denies vaginal bleeding, abnormal discharge, LOF, abdominal pain/ctx. +FM. She has been noticing some tightening and abdominal pressure. Also some swelling of the lower extremities and hands but this resolves with rest.    /58   Ht 1.702 m (5' 7\")   Wt 94.6 kg (208 lb 9.6 oz)   LMP 2024   BMI 32.67 kg/m        ICD-10-CM    1. Supervision of high risk pregnancy in third trimester  O09.93 US OB Follow Up >= 14 Weeks      2. Iron deficiency anemia secondary to inadequate dietary iron intake  D50.8       3. Hx of pre-eclampsia in prior pregnancy, currently pregnant  O09.299       4. Personal history of DVT (deep vein thrombosis)  Z86.718       5. 31 weeks gestation of pregnancy  Z3A.31           - MFM consult note:              1. Continue Lovenox through 6 weeks PP              2. Growth US @ 36 weeks to discuss route of delivery given history of shoulder dystocia            - First trimester labs WNL except for anemia. Baseline PreE labs WNL  - Anemia: IV iron (last dose ). Hgb 12.4  - NIPT neg/XY; AFP 3/21  - Level 2 ():  - Pre-eclampsia prevention:              81mg aspirin started at 12 weeks   - 3rd tri labs: 1hr GCT passed  - GBS due at 36 weeks  - Reviewed Labor and PTL precautions, expected fetal movement and kick counts, PreE signs/symptoms  - TW lbs. Pregravid BMI 29 Expect 15-25 lbs.  - follow-up in 2 weeks      Ruthie Quiñones MD    "

## 2025-07-25 ENCOUNTER — PRENATAL OFFICE VISIT (OUTPATIENT)
Dept: OBGYN | Facility: CLINIC | Age: 35
End: 2025-07-25
Payer: COMMERCIAL

## 2025-07-25 VITALS
SYSTOLIC BLOOD PRESSURE: 100 MMHG | BODY MASS INDEX: 33.49 KG/M2 | WEIGHT: 213.4 LBS | DIASTOLIC BLOOD PRESSURE: 60 MMHG | HEIGHT: 67 IN

## 2025-07-25 DIAGNOSIS — Z86.718 PERSONAL HISTORY OF DVT (DEEP VEIN THROMBOSIS): ICD-10-CM

## 2025-07-25 DIAGNOSIS — D50.8 IRON DEFICIENCY ANEMIA SECONDARY TO INADEQUATE DIETARY IRON INTAKE: ICD-10-CM

## 2025-07-25 DIAGNOSIS — O09.93 SUPERVISION OF HIGH RISK PREGNANCY IN THIRD TRIMESTER: Primary | ICD-10-CM

## 2025-07-25 DIAGNOSIS — Z3A.34 34 WEEKS GESTATION OF PREGNANCY: ICD-10-CM

## 2025-07-25 DIAGNOSIS — O09.299 HX OF PRE-ECLAMPSIA IN PRIOR PREGNANCY, CURRENTLY PREGNANT: ICD-10-CM

## 2025-07-25 PROCEDURE — 0502F SUBSEQUENT PRENATAL CARE: CPT | Performed by: STUDENT IN AN ORGANIZED HEALTH CARE EDUCATION/TRAINING PROGRAM

## 2025-07-25 PROCEDURE — 99207 PR COMPLICATED OB VISIT: CPT | Performed by: STUDENT IN AN ORGANIZED HEALTH CARE EDUCATION/TRAINING PROGRAM

## 2025-07-25 PROCEDURE — 3074F SYST BP LT 130 MM HG: CPT | Performed by: STUDENT IN AN ORGANIZED HEALTH CARE EDUCATION/TRAINING PROGRAM

## 2025-07-25 PROCEDURE — 3078F DIAST BP <80 MM HG: CPT | Performed by: STUDENT IN AN ORGANIZED HEALTH CARE EDUCATION/TRAINING PROGRAM

## 2025-07-28 PROBLEM — Z86.718 PERSONAL HISTORY OF DVT (DEEP VEIN THROMBOSIS): Status: ACTIVE | Noted: 2025-07-28

## 2025-07-28 NOTE — PROGRESS NOTES
"Rakel Shaw is a 35 year old  at 34w3d by LMP c/w 8 wk US here today for routine OB visit.    Doing well and denies vaginal bleeding, abnormal discharge, LOF, abdominal pain/ctx. +FM. Denies vision changes, CP, SOB, severe N/V, RUQ tenderness, worsening swelling. She does report lately feeling more uncomfortable, intermittent headaches, more swelling (that does resolve), and just more vague symptoms similar to her last pregnancy before she started having high blood pressures and developed preeclampsia. She is worried about timing of delivery and hx of shoulder dystocia with her being on lovenox.    /60   Ht 1.702 m (5' 7\")   Wt 96.8 kg (213 lb 6.4 oz)   LMP 2024   BMI 33.42 kg/m        ICD-10-CM    1. Supervision of high risk pregnancy in third trimester  O09.93       2. Iron deficiency anemia secondary to inadequate dietary iron intake  D50.8       3. Hx of pre-eclampsia in prior pregnancy, currently pregnant  O09.299       4. Personal history of DVT (deep vein thrombosis)  Z86.718       5. Lactating mother  Z39.1 Breast Pump Order for DME - ONLY FOR DME      6. 34 weeks gestation of pregnancy  Z3A.34           - encouraged supportive measures and magnesium for her headaches  - long discussion on considerations for her hx of shoulder dystocia, pree precautions, and timing for induction. She is very strongly desiring earlier induction and given her history and with her prior delivery at 37 weeks, I do think it is reasonable. We discussed risks vs benefits of earlier induction and she is agreeable for 38 weeks if no blood pressure issues arise      -- she is interested in  as she will be 38/0 that day and I will be working Fri/Sun that weekend    - breast pump rx given    - MFM consult note:              1. Continue Lovenox through 6 weeks PP              2. Growth US @ 36 weeks to discuss route of delivery given history of shoulder dystocia            - First trimester labs WNL except for " anemia. Baseline PreE labs WNL  - Anemia: IV iron (last dose ). Hgb 12.4  - NIPT neg/XY; AFP 3/21  - Level 2 ():  - Pre-eclampsia prevention:              81mg aspirin started at 12 weeks   - 3rd tri labs: 1hr GCT passed  - GBS due at 36 weeks, next visit  - Reviewed Labor and PTL precautions, expected fetal movement and kick counts, PreE signs/symptoms  - TW lbs. Pregravid BMI 29 Expect 15-25 lbs.  - follow-up in 2 weeks      Ruthie Quiñones MD

## 2025-08-01 ENCOUNTER — HOSPITAL ENCOUNTER (OUTPATIENT)
Facility: CLINIC | Age: 35
Discharge: HOME | End: 2025-08-01
Attending: OBSTETRICS & GYNECOLOGY | Admitting: OBSTETRICS & GYNECOLOGY
Payer: COMMERCIAL

## 2025-08-01 VITALS — RESPIRATION RATE: 16 BRPM | SYSTOLIC BLOOD PRESSURE: 118 MMHG | DIASTOLIC BLOOD PRESSURE: 64 MMHG | TEMPERATURE: 97.8 F

## 2025-08-01 DIAGNOSIS — D50.8 IRON DEFICIENCY ANEMIA SECONDARY TO INADEQUATE DIETARY IRON INTAKE: ICD-10-CM

## 2025-08-01 DIAGNOSIS — O09.93 SUPERVISION OF HIGH RISK PREGNANCY IN THIRD TRIMESTER: Primary | ICD-10-CM

## 2025-08-01 LAB
ALBUMIN UR-MCNC: 70 MG/DL
APPEARANCE UR: CLEAR
BILIRUB UR QL STRIP: NEGATIVE
CLUE CELLS: ABNORMAL
COLOR UR AUTO: YELLOW
GLUCOSE UR STRIP-MCNC: NEGATIVE MG/DL
HGB UR QL STRIP: ABNORMAL
KETONES UR STRIP-MCNC: ABNORMAL MG/DL
LEUKOCYTE ESTERASE UR QL STRIP: NEGATIVE
MUCOUS THREADS #/AREA URNS LPF: PRESENT /LPF
NITRATE UR QL: NEGATIVE
PH UR STRIP: 6 [PH] (ref 5–7)
RBC URINE: 3 /HPF
SP GR UR STRIP: 1.03 (ref 1–1.03)
SQUAMOUS EPITHELIAL: 2 /HPF
TRICHOMONAS, WET PREP: ABNORMAL
UROBILINOGEN UR STRIP-MCNC: 2 MG/DL
WBC URINE: 2 /HPF
WBC'S/HIGH POWER FIELD, WET PREP: ABNORMAL
YEAST, WET PREP: ABNORMAL

## 2025-08-01 PROCEDURE — 87086 URINE CULTURE/COLONY COUNT: CPT | Performed by: OBSTETRICS & GYNECOLOGY

## 2025-08-01 PROCEDURE — 87210 SMEAR WET MOUNT SALINE/INK: CPT | Performed by: OBSTETRICS & GYNECOLOGY

## 2025-08-01 PROCEDURE — G0463 HOSPITAL OUTPT CLINIC VISIT: HCPCS | Mod: 25

## 2025-08-01 PROCEDURE — 81001 URINALYSIS AUTO W/SCOPE: CPT | Performed by: OBSTETRICS & GYNECOLOGY

## 2025-08-01 PROCEDURE — 59025 FETAL NON-STRESS TEST: CPT

## 2025-08-01 ASSESSMENT — ACTIVITIES OF DAILY LIVING (ADL)
ADLS_ACUITY_SCORE: 36
ADLS_ACUITY_SCORE: 59
ADLS_ACUITY_SCORE: 36
ADLS_ACUITY_SCORE: 59

## 2025-08-03 LAB — BACTERIA UR CULT: NORMAL

## 2025-08-06 ENCOUNTER — PRENATAL OFFICE VISIT (OUTPATIENT)
Dept: OBGYN | Facility: CLINIC | Age: 35
End: 2025-08-06
Payer: COMMERCIAL

## 2025-08-06 ENCOUNTER — ANCILLARY PROCEDURE (OUTPATIENT)
Dept: ULTRASOUND IMAGING | Facility: CLINIC | Age: 35
End: 2025-08-06
Attending: STUDENT IN AN ORGANIZED HEALTH CARE EDUCATION/TRAINING PROGRAM
Payer: COMMERCIAL

## 2025-08-06 VITALS
DIASTOLIC BLOOD PRESSURE: 66 MMHG | SYSTOLIC BLOOD PRESSURE: 108 MMHG | WEIGHT: 220.8 LBS | HEIGHT: 67 IN | BODY MASS INDEX: 34.65 KG/M2

## 2025-08-06 DIAGNOSIS — Z3A.35 35 WEEKS GESTATION OF PREGNANCY: ICD-10-CM

## 2025-08-06 DIAGNOSIS — D50.8 IRON DEFICIENCY ANEMIA SECONDARY TO INADEQUATE DIETARY IRON INTAKE: ICD-10-CM

## 2025-08-06 DIAGNOSIS — O40.9XX1 POLYHYDRAMNIOS, ANTEPARTUM, FETUS 1 OF MULTIPLE GESTATION: ICD-10-CM

## 2025-08-06 DIAGNOSIS — Z86.718 PERSONAL HISTORY OF DVT (DEEP VEIN THROMBOSIS): ICD-10-CM

## 2025-08-06 DIAGNOSIS — O09.93 SUPERVISION OF HIGH RISK PREGNANCY IN THIRD TRIMESTER: ICD-10-CM

## 2025-08-06 DIAGNOSIS — O09.299 HX OF PRE-ECLAMPSIA IN PRIOR PREGNANCY, CURRENTLY PREGNANT: ICD-10-CM

## 2025-08-06 DIAGNOSIS — O09.93 SUPERVISION OF HIGH RISK PREGNANCY IN THIRD TRIMESTER: Primary | ICD-10-CM

## 2025-08-06 DIAGNOSIS — Z36.85 SCREENING, ANTENATAL, FOR STREPTOCOCCUS B: ICD-10-CM

## 2025-08-06 PROCEDURE — 76816 OB US FOLLOW-UP PER FETUS: CPT | Performed by: STUDENT IN AN ORGANIZED HEALTH CARE EDUCATION/TRAINING PROGRAM

## 2025-08-06 PROCEDURE — 3078F DIAST BP <80 MM HG: CPT | Performed by: STUDENT IN AN ORGANIZED HEALTH CARE EDUCATION/TRAINING PROGRAM

## 2025-08-06 PROCEDURE — 0502F SUBSEQUENT PRENATAL CARE: CPT | Performed by: STUDENT IN AN ORGANIZED HEALTH CARE EDUCATION/TRAINING PROGRAM

## 2025-08-06 PROCEDURE — 3074F SYST BP LT 130 MM HG: CPT | Performed by: STUDENT IN AN ORGANIZED HEALTH CARE EDUCATION/TRAINING PROGRAM

## 2025-08-06 PROCEDURE — 87653 STREP B DNA AMP PROBE: CPT | Performed by: STUDENT IN AN ORGANIZED HEALTH CARE EDUCATION/TRAINING PROGRAM

## 2025-08-06 PROCEDURE — 99207 PR COMPLICATED OB VISIT: CPT | Performed by: STUDENT IN AN ORGANIZED HEALTH CARE EDUCATION/TRAINING PROGRAM

## 2025-08-07 LAB — GP B STREP DNA SPEC QL NAA+PROBE: NEGATIVE

## 2025-08-13 ENCOUNTER — PRENATAL OFFICE VISIT (OUTPATIENT)
Dept: OBGYN | Facility: CLINIC | Age: 35
End: 2025-08-13
Payer: COMMERCIAL

## 2025-08-13 ENCOUNTER — ANCILLARY PROCEDURE (OUTPATIENT)
Dept: ULTRASOUND IMAGING | Facility: CLINIC | Age: 35
End: 2025-08-13
Attending: STUDENT IN AN ORGANIZED HEALTH CARE EDUCATION/TRAINING PROGRAM
Payer: COMMERCIAL

## 2025-08-13 VITALS
SYSTOLIC BLOOD PRESSURE: 100 MMHG | HEIGHT: 67 IN | BODY MASS INDEX: 34.4 KG/M2 | WEIGHT: 219.2 LBS | DIASTOLIC BLOOD PRESSURE: 66 MMHG

## 2025-08-13 DIAGNOSIS — Z3A.36 36 WEEKS GESTATION OF PREGNANCY: ICD-10-CM

## 2025-08-13 DIAGNOSIS — O09.299 HX OF PRE-ECLAMPSIA IN PRIOR PREGNANCY, CURRENTLY PREGNANT: ICD-10-CM

## 2025-08-13 DIAGNOSIS — Z86.718 PERSONAL HISTORY OF DVT (DEEP VEIN THROMBOSIS): ICD-10-CM

## 2025-08-13 DIAGNOSIS — O09.93 SUPERVISION OF HIGH RISK PREGNANCY IN THIRD TRIMESTER: Primary | ICD-10-CM

## 2025-08-13 DIAGNOSIS — D50.8 IRON DEFICIENCY ANEMIA SECONDARY TO INADEQUATE DIETARY IRON INTAKE: ICD-10-CM

## 2025-08-13 DIAGNOSIS — O40.9XX1 POLYHYDRAMNIOS, ANTEPARTUM, FETUS 1 OF MULTIPLE GESTATION: ICD-10-CM

## 2025-08-13 PROCEDURE — 0502F SUBSEQUENT PRENATAL CARE: CPT | Performed by: STUDENT IN AN ORGANIZED HEALTH CARE EDUCATION/TRAINING PROGRAM

## 2025-08-13 PROCEDURE — 99207 PR PRENATAL VISIT: CPT | Performed by: STUDENT IN AN ORGANIZED HEALTH CARE EDUCATION/TRAINING PROGRAM

## 2025-08-13 PROCEDURE — 76819 FETAL BIOPHYS PROFIL W/O NST: CPT | Performed by: STUDENT IN AN ORGANIZED HEALTH CARE EDUCATION/TRAINING PROGRAM

## 2025-08-13 PROCEDURE — 3074F SYST BP LT 130 MM HG: CPT | Performed by: STUDENT IN AN ORGANIZED HEALTH CARE EDUCATION/TRAINING PROGRAM

## 2025-08-13 PROCEDURE — 3078F DIAST BP <80 MM HG: CPT | Performed by: STUDENT IN AN ORGANIZED HEALTH CARE EDUCATION/TRAINING PROGRAM

## 2025-08-13 RX ORDER — OXYTOCIN 10 [USP'U]/ML
10 INJECTION, SOLUTION INTRAMUSCULAR; INTRAVENOUS
OUTPATIENT
Start: 2025-08-13

## 2025-08-13 RX ORDER — METOCLOPRAMIDE 5 MG/1
10 TABLET ORAL EVERY 6 HOURS PRN
OUTPATIENT
Start: 2025-08-13

## 2025-08-13 RX ORDER — HYDROXYZINE HYDROCHLORIDE 50 MG/1
50 TABLET, FILM COATED ORAL
OUTPATIENT
Start: 2025-08-13

## 2025-08-13 RX ORDER — ONDANSETRON 4 MG/1
4 TABLET, ORALLY DISINTEGRATING ORAL EVERY 6 HOURS PRN
OUTPATIENT
Start: 2025-08-13

## 2025-08-13 RX ORDER — LIDOCAINE 40 MG/G
CREAM TOPICAL
OUTPATIENT
Start: 2025-08-13

## 2025-08-13 RX ORDER — ONDANSETRON 2 MG/ML
4 INJECTION INTRAMUSCULAR; INTRAVENOUS EVERY 6 HOURS PRN
OUTPATIENT
Start: 2025-08-13

## 2025-08-13 RX ORDER — LOPERAMIDE HYDROCHLORIDE 2 MG/1
4 CAPSULE ORAL
OUTPATIENT
Start: 2025-08-13

## 2025-08-13 RX ORDER — KETOROLAC TROMETHAMINE 30 MG/ML
15 INJECTION, SOLUTION INTRAMUSCULAR; INTRAVENOUS
OUTPATIENT
Start: 2025-08-13

## 2025-08-13 RX ORDER — CITRIC ACID/SODIUM CITRATE 334-500MG
30 SOLUTION, ORAL ORAL ONCE
OUTPATIENT
Start: 2025-08-13 | End: 2025-08-13

## 2025-08-13 RX ORDER — MISOPROSTOL 200 UG/1
800 TABLET ORAL
OUTPATIENT
Start: 2025-08-13

## 2025-08-13 RX ORDER — FENTANYL CITRATE 50 UG/ML
100 INJECTION, SOLUTION INTRAMUSCULAR; INTRAVENOUS
Refills: 0 | OUTPATIENT
Start: 2025-08-13

## 2025-08-13 RX ORDER — CARBOPROST TROMETHAMINE 250 UG/ML
250 INJECTION, SOLUTION INTRAMUSCULAR
OUTPATIENT
Start: 2025-08-13

## 2025-08-13 RX ORDER — METHYLERGONOVINE MALEATE 0.2 MG/ML
200 INJECTION INTRAVENOUS
OUTPATIENT
Start: 2025-08-13

## 2025-08-13 RX ORDER — ACETAMINOPHEN 325 MG/1
650 TABLET ORAL EVERY 4 HOURS PRN
OUTPATIENT
Start: 2025-08-13

## 2025-08-13 RX ORDER — PROCHLORPERAZINE MALEATE 5 MG/1
10 TABLET ORAL EVERY 6 HOURS PRN
OUTPATIENT
Start: 2025-08-13

## 2025-08-13 RX ORDER — OXYTOCIN/0.9 % SODIUM CHLORIDE 30/500 ML
100-340 PLASTIC BAG, INJECTION (ML) INTRAVENOUS CONTINUOUS PRN
OUTPATIENT
Start: 2025-08-13

## 2025-08-13 RX ORDER — OXYTOCIN/0.9 % SODIUM CHLORIDE 30/500 ML
1-24 PLASTIC BAG, INJECTION (ML) INTRAVENOUS CONTINUOUS
OUTPATIENT
Start: 2025-08-13

## 2025-08-13 RX ORDER — CITRIC ACID/SODIUM CITRATE 334-500MG
30 SOLUTION, ORAL ORAL
OUTPATIENT
Start: 2025-08-13

## 2025-08-13 RX ORDER — MISOPROSTOL 100 UG/1
400 TABLET ORAL
OUTPATIENT
Start: 2025-08-13

## 2025-08-13 RX ORDER — SODIUM CHLORIDE, SODIUM LACTATE, POTASSIUM CHLORIDE, CALCIUM CHLORIDE 600; 310; 30; 20 MG/100ML; MG/100ML; MG/100ML; MG/100ML
INJECTION, SOLUTION INTRAVENOUS CONTINUOUS PRN
OUTPATIENT
Start: 2025-08-13

## 2025-08-13 RX ORDER — TERBUTALINE SULFATE 1 MG/ML
0.25 INJECTION SUBCUTANEOUS
OUTPATIENT
Start: 2025-08-13

## 2025-08-13 RX ORDER — MISOPROSTOL 100 UG/1
25 TABLET ORAL EVERY 4 HOURS
OUTPATIENT
Start: 2025-08-13 | End: 2025-08-14

## 2025-08-13 RX ORDER — LOPERAMIDE HYDROCHLORIDE 2 MG/1
2 CAPSULE ORAL
OUTPATIENT
Start: 2025-08-13

## 2025-08-13 RX ORDER — OXYTOCIN/0.9 % SODIUM CHLORIDE 30/500 ML
340 PLASTIC BAG, INJECTION (ML) INTRAVENOUS CONTINUOUS PRN
OUTPATIENT
Start: 2025-08-13

## 2025-08-13 RX ORDER — IBUPROFEN 200 MG
800 TABLET ORAL
OUTPATIENT
Start: 2025-08-13

## 2025-08-13 RX ORDER — METOCLOPRAMIDE HYDROCHLORIDE 5 MG/ML
10 INJECTION INTRAMUSCULAR; INTRAVENOUS EVERY 6 HOURS PRN
OUTPATIENT
Start: 2025-08-13

## 2025-08-20 ENCOUNTER — PRENATAL OFFICE VISIT (OUTPATIENT)
Dept: OBGYN | Facility: CLINIC | Age: 35
End: 2025-08-20
Payer: COMMERCIAL

## 2025-08-20 VITALS
SYSTOLIC BLOOD PRESSURE: 100 MMHG | WEIGHT: 221.4 LBS | HEIGHT: 67 IN | DIASTOLIC BLOOD PRESSURE: 68 MMHG | BODY MASS INDEX: 34.75 KG/M2

## 2025-08-20 DIAGNOSIS — O40.9XX1 POLYHYDRAMNIOS, ANTEPARTUM, FETUS 1 OF MULTIPLE GESTATION: ICD-10-CM

## 2025-08-20 DIAGNOSIS — O09.93 SUPERVISION OF HIGH RISK PREGNANCY IN THIRD TRIMESTER: Primary | ICD-10-CM

## 2025-08-20 DIAGNOSIS — Z3A.37 37 WEEKS GESTATION OF PREGNANCY: ICD-10-CM

## 2025-08-20 DIAGNOSIS — D50.8 IRON DEFICIENCY ANEMIA SECONDARY TO INADEQUATE DIETARY IRON INTAKE: ICD-10-CM

## 2025-08-20 DIAGNOSIS — O09.299 HX OF PRE-ECLAMPSIA IN PRIOR PREGNANCY, CURRENTLY PREGNANT: ICD-10-CM

## 2025-08-20 DIAGNOSIS — Z86.718 PERSONAL HISTORY OF DVT (DEEP VEIN THROMBOSIS): ICD-10-CM

## 2025-08-20 PROCEDURE — 3078F DIAST BP <80 MM HG: CPT | Performed by: STUDENT IN AN ORGANIZED HEALTH CARE EDUCATION/TRAINING PROGRAM

## 2025-08-20 PROCEDURE — 3074F SYST BP LT 130 MM HG: CPT | Performed by: STUDENT IN AN ORGANIZED HEALTH CARE EDUCATION/TRAINING PROGRAM

## 2025-08-20 PROCEDURE — 99207 PR PRENATAL VISIT: CPT | Performed by: STUDENT IN AN ORGANIZED HEALTH CARE EDUCATION/TRAINING PROGRAM

## 2025-08-20 PROCEDURE — 0502F SUBSEQUENT PRENATAL CARE: CPT | Performed by: STUDENT IN AN ORGANIZED HEALTH CARE EDUCATION/TRAINING PROGRAM

## 2025-08-21 ENCOUNTER — HOSPITAL ENCOUNTER (INPATIENT)
Facility: CLINIC | Age: 35
LOS: 3 days | Discharge: HOME | End: 2025-08-24
Attending: STUDENT IN AN ORGANIZED HEALTH CARE EDUCATION/TRAINING PROGRAM | Admitting: STUDENT IN AN ORGANIZED HEALTH CARE EDUCATION/TRAINING PROGRAM
Payer: COMMERCIAL

## 2025-08-21 DIAGNOSIS — O09.93 SUPERVISION OF HIGH RISK PREGNANCY IN THIRD TRIMESTER: Primary | ICD-10-CM

## 2025-08-21 DIAGNOSIS — D50.8 IRON DEFICIENCY ANEMIA SECONDARY TO INADEQUATE DIETARY IRON INTAKE: ICD-10-CM

## 2025-08-21 PROBLEM — Z34.90 ENCOUNTER FOR INDUCTION OF LABOR: Status: ACTIVE | Noted: 2025-08-21

## 2025-08-21 LAB
ABO + RH BLD: NORMAL
BLD GP AB SCN SERPL QL: NEGATIVE
ERYTHROCYTE [DISTWIDTH] IN BLOOD BY AUTOMATED COUNT: 13.8 % (ref 10–15)
HCT VFR BLD AUTO: 34.9 % (ref 35–47)
HGB BLD-MCNC: 11.8 G/DL (ref 11.7–15.7)
MCH RBC QN AUTO: 27.9 PG (ref 26.5–33)
MCHC RBC AUTO-ENTMCNC: 33.8 G/DL (ref 31.5–36.5)
MCV RBC AUTO: 82.5 FL (ref 78–100)
PLATELET # BLD AUTO: 324 10E3/UL (ref 150–450)
RBC # BLD AUTO: 4.23 10E6/UL (ref 3.8–5.2)
SPECIMEN EXP DATE BLD: NORMAL
WBC # BLD AUTO: 13.11 10E3/UL (ref 4–11)

## 2025-08-21 PROCEDURE — 120N000013 HC R&B IMCU

## 2025-08-21 PROCEDURE — 86901 BLOOD TYPING SEROLOGIC RH(D): CPT | Performed by: STUDENT IN AN ORGANIZED HEALTH CARE EDUCATION/TRAINING PROGRAM

## 2025-08-21 PROCEDURE — 85014 HEMATOCRIT: CPT | Performed by: STUDENT IN AN ORGANIZED HEALTH CARE EDUCATION/TRAINING PROGRAM

## 2025-08-21 PROCEDURE — 250N000013 HC RX MED GY IP 250 OP 250 PS 637: Performed by: STUDENT IN AN ORGANIZED HEALTH CARE EDUCATION/TRAINING PROGRAM

## 2025-08-21 PROCEDURE — 86780 TREPONEMA PALLIDUM: CPT | Performed by: STUDENT IN AN ORGANIZED HEALTH CARE EDUCATION/TRAINING PROGRAM

## 2025-08-21 RX ORDER — IBUPROFEN 400 MG/1
800 TABLET, FILM COATED ORAL
Status: DISCONTINUED | OUTPATIENT
Start: 2025-08-21 | End: 2025-08-24 | Stop reason: HOSPADM

## 2025-08-21 RX ORDER — KETOROLAC TROMETHAMINE 15 MG/ML
15 INJECTION, SOLUTION INTRAMUSCULAR; INTRAVENOUS
Status: DISCONTINUED | OUTPATIENT
Start: 2025-08-21 | End: 2025-08-24 | Stop reason: HOSPADM

## 2025-08-21 RX ORDER — ONDANSETRON 4 MG/1
4 TABLET, ORALLY DISINTEGRATING ORAL EVERY 6 HOURS PRN
Status: DISCONTINUED | OUTPATIENT
Start: 2025-08-21 | End: 2025-08-22 | Stop reason: HOSPADM

## 2025-08-21 RX ORDER — METOCLOPRAMIDE HYDROCHLORIDE 5 MG/ML
10 INJECTION INTRAMUSCULAR; INTRAVENOUS EVERY 6 HOURS PRN
Status: DISCONTINUED | OUTPATIENT
Start: 2025-08-21 | End: 2025-08-22 | Stop reason: HOSPADM

## 2025-08-21 RX ORDER — LOPERAMIDE HYDROCHLORIDE 2 MG/1
4 CAPSULE ORAL
Status: DISCONTINUED | OUTPATIENT
Start: 2025-08-21 | End: 2025-08-22 | Stop reason: HOSPADM

## 2025-08-21 RX ORDER — ONDANSETRON 2 MG/ML
4 INJECTION INTRAMUSCULAR; INTRAVENOUS EVERY 6 HOURS PRN
Status: DISCONTINUED | OUTPATIENT
Start: 2025-08-21 | End: 2025-08-22 | Stop reason: HOSPADM

## 2025-08-21 RX ORDER — HYDROXYZINE HYDROCHLORIDE 25 MG/1
50 TABLET, FILM COATED ORAL
Status: DISCONTINUED | OUTPATIENT
Start: 2025-08-21 | End: 2025-08-22

## 2025-08-21 RX ORDER — LOPERAMIDE HYDROCHLORIDE 2 MG/1
2 CAPSULE ORAL
Status: DISCONTINUED | OUTPATIENT
Start: 2025-08-21 | End: 2025-08-22 | Stop reason: HOSPADM

## 2025-08-21 RX ORDER — METHYLERGONOVINE MALEATE 0.2 MG/ML
200 INJECTION INTRAVENOUS
Status: DISCONTINUED | OUTPATIENT
Start: 2025-08-21 | End: 2025-08-22 | Stop reason: HOSPADM

## 2025-08-21 RX ORDER — CARBOPROST TROMETHAMINE 250 UG/ML
250 INJECTION, SOLUTION INTRAMUSCULAR
Status: DISCONTINUED | OUTPATIENT
Start: 2025-08-21 | End: 2025-08-22 | Stop reason: HOSPADM

## 2025-08-21 RX ORDER — LIDOCAINE 40 MG/G
CREAM TOPICAL
Status: DISCONTINUED | OUTPATIENT
Start: 2025-08-21 | End: 2025-08-24 | Stop reason: HOSPADM

## 2025-08-21 RX ORDER — MISOPROSTOL 100 UG/1
25 TABLET ORAL EVERY 4 HOURS
Status: DISCONTINUED | OUTPATIENT
Start: 2025-08-21 | End: 2025-08-22

## 2025-08-21 RX ORDER — METOCLOPRAMIDE 10 MG/1
10 TABLET ORAL EVERY 6 HOURS PRN
Status: DISCONTINUED | OUTPATIENT
Start: 2025-08-21 | End: 2025-08-22 | Stop reason: HOSPADM

## 2025-08-21 RX ORDER — ESCITALOPRAM OXALATE 10 MG/1
10 TABLET ORAL AT BEDTIME
Status: DISCONTINUED | OUTPATIENT
Start: 2025-08-21 | End: 2025-08-24 | Stop reason: HOSPADM

## 2025-08-21 RX ORDER — TERBUTALINE SULFATE 1 MG/ML
0.25 INJECTION SUBCUTANEOUS
Status: DISCONTINUED | OUTPATIENT
Start: 2025-08-21 | End: 2025-08-22 | Stop reason: HOSPADM

## 2025-08-21 RX ORDER — CITRIC ACID/SODIUM CITRATE 334-500MG
30 SOLUTION, ORAL ORAL
Status: DISCONTINUED | OUTPATIENT
Start: 2025-08-21 | End: 2025-08-22 | Stop reason: HOSPADM

## 2025-08-21 RX ORDER — OXYTOCIN/0.9 % SODIUM CHLORIDE 30/500 ML
100-340 PLASTIC BAG, INJECTION (ML) INTRAVENOUS CONTINUOUS PRN
Status: DISCONTINUED | OUTPATIENT
Start: 2025-08-21 | End: 2025-08-24 | Stop reason: HOSPADM

## 2025-08-21 RX ORDER — MISOPROSTOL 200 UG/1
400 TABLET ORAL
Status: DISCONTINUED | OUTPATIENT
Start: 2025-08-21 | End: 2025-08-22 | Stop reason: HOSPADM

## 2025-08-21 RX ORDER — ACETAMINOPHEN 325 MG/1
650 TABLET ORAL EVERY 4 HOURS PRN
Status: DISCONTINUED | OUTPATIENT
Start: 2025-08-21 | End: 2025-08-22 | Stop reason: HOSPADM

## 2025-08-21 RX ORDER — CITRIC ACID/SODIUM CITRATE 334-500MG
30 SOLUTION, ORAL ORAL ONCE
Status: COMPLETED | OUTPATIENT
Start: 2025-08-21 | End: 2025-08-21

## 2025-08-21 RX ORDER — PROCHLORPERAZINE MALEATE 5 MG/1
10 TABLET ORAL EVERY 6 HOURS PRN
Status: DISCONTINUED | OUTPATIENT
Start: 2025-08-21 | End: 2025-08-22 | Stop reason: HOSPADM

## 2025-08-21 RX ORDER — MISOPROSTOL 200 UG/1
800 TABLET ORAL
Status: DISCONTINUED | OUTPATIENT
Start: 2025-08-21 | End: 2025-08-22 | Stop reason: HOSPADM

## 2025-08-21 RX ORDER — TRANEXAMIC ACID 10 MG/ML
1 INJECTION, SOLUTION INTRAVENOUS EVERY 30 MIN PRN
Status: DISCONTINUED | OUTPATIENT
Start: 2025-08-21 | End: 2025-08-22 | Stop reason: HOSPADM

## 2025-08-21 RX ORDER — OXYTOCIN 10 [USP'U]/ML
10 INJECTION, SOLUTION INTRAMUSCULAR; INTRAVENOUS
Status: DISCONTINUED | OUTPATIENT
Start: 2025-08-21 | End: 2025-08-24 | Stop reason: HOSPADM

## 2025-08-21 RX ORDER — OXYTOCIN/0.9 % SODIUM CHLORIDE 30/500 ML
340 PLASTIC BAG, INJECTION (ML) INTRAVENOUS CONTINUOUS PRN
Status: DISCONTINUED | OUTPATIENT
Start: 2025-08-21 | End: 2025-08-22 | Stop reason: HOSPADM

## 2025-08-21 RX ORDER — OXYTOCIN 10 [USP'U]/ML
10 INJECTION, SOLUTION INTRAMUSCULAR; INTRAVENOUS
Status: DISCONTINUED | OUTPATIENT
Start: 2025-08-21 | End: 2025-08-22 | Stop reason: HOSPADM

## 2025-08-21 RX ORDER — FENTANYL CITRATE 50 UG/ML
100 INJECTION, SOLUTION INTRAMUSCULAR; INTRAVENOUS
Status: DISCONTINUED | OUTPATIENT
Start: 2025-08-21 | End: 2025-08-22 | Stop reason: HOSPADM

## 2025-08-21 RX ADMIN — HYDROXYZINE HYDROCHLORIDE 50 MG: 25 TABLET ORAL at 21:16

## 2025-08-21 RX ADMIN — MISOPROSTOL 25 MCG: 100 TABLET ORAL at 20:38

## 2025-08-21 RX ADMIN — ESCITALOPRAM OXALATE 10 MG: 10 TABLET ORAL at 21:16

## 2025-08-21 ASSESSMENT — ACTIVITIES OF DAILY LIVING (ADL)
ADLS_ACUITY_SCORE: 36

## 2025-08-22 ENCOUNTER — ANESTHESIA EVENT (OUTPATIENT)
Dept: OBGYN | Facility: CLINIC | Age: 35
End: 2025-08-22
Payer: COMMERCIAL

## 2025-08-22 ENCOUNTER — ANESTHESIA (OUTPATIENT)
Dept: OBGYN | Facility: CLINIC | Age: 35
End: 2025-08-22
Payer: COMMERCIAL

## 2025-08-22 LAB
CREAT SERPL-MCNC: 0.69 MG/DL (ref 0.51–0.95)
EGFRCR SERPLBLD CKD-EPI 2021: >90 ML/MIN/1.73M2
MCV RBC AUTO: 84.4 FL (ref 78–100)
PLATELET # BLD AUTO: 308 10E3/UL (ref 150–450)
T PALLIDUM AB SER QL: NONREACTIVE

## 2025-08-22 PROCEDURE — 250N000011 HC RX IP 250 OP 636: Performed by: ANESTHESIOLOGY

## 2025-08-22 PROCEDURE — 82565 ASSAY OF CREATININE: CPT | Performed by: STUDENT IN AN ORGANIZED HEALTH CARE EDUCATION/TRAINING PROGRAM

## 2025-08-22 PROCEDURE — 59400 OBSTETRICAL CARE: CPT | Performed by: STUDENT IN AN ORGANIZED HEALTH CARE EDUCATION/TRAINING PROGRAM

## 2025-08-22 PROCEDURE — 258N000003 HC RX IP 258 OP 636: Performed by: STUDENT IN AN ORGANIZED HEALTH CARE EDUCATION/TRAINING PROGRAM

## 2025-08-22 PROCEDURE — 250N000009 HC RX 250: Performed by: STUDENT IN AN ORGANIZED HEALTH CARE EDUCATION/TRAINING PROGRAM

## 2025-08-22 PROCEDURE — 250N000009 HC RX 250: Performed by: ANESTHESIOLOGY

## 2025-08-22 PROCEDURE — 722N000001 HC LABOR CARE VAGINAL DELIVERY SINGLE

## 2025-08-22 PROCEDURE — 120N000012 HC R&B POSTPARTUM

## 2025-08-22 PROCEDURE — 999N000016 HC STATISTIC ATTENDANCE AT DELIVERY

## 2025-08-22 PROCEDURE — 250N000011 HC RX IP 250 OP 636: Performed by: STUDENT IN AN ORGANIZED HEALTH CARE EDUCATION/TRAINING PROGRAM

## 2025-08-22 PROCEDURE — 85049 AUTOMATED PLATELET COUNT: CPT | Performed by: STUDENT IN AN ORGANIZED HEALTH CARE EDUCATION/TRAINING PROGRAM

## 2025-08-22 PROCEDURE — 250N000013 HC RX MED GY IP 250 OP 250 PS 637: Performed by: STUDENT IN AN ORGANIZED HEALTH CARE EDUCATION/TRAINING PROGRAM

## 2025-08-22 PROCEDURE — 36415 COLL VENOUS BLD VENIPUNCTURE: CPT | Performed by: STUDENT IN AN ORGANIZED HEALTH CARE EDUCATION/TRAINING PROGRAM

## 2025-08-22 PROCEDURE — 370N000003 HC ANESTHESIA WARD SERVICE: Performed by: ANESTHESIOLOGY

## 2025-08-22 RX ORDER — IBUPROFEN 400 MG/1
800 TABLET, FILM COATED ORAL EVERY 6 HOURS PRN
Status: DISCONTINUED | OUTPATIENT
Start: 2025-08-22 | End: 2025-08-24 | Stop reason: HOSPADM

## 2025-08-22 RX ORDER — OXYTOCIN/0.9 % SODIUM CHLORIDE 30/500 ML
340 PLASTIC BAG, INJECTION (ML) INTRAVENOUS CONTINUOUS PRN
Status: DISCONTINUED | OUTPATIENT
Start: 2025-08-22 | End: 2025-08-24 | Stop reason: HOSPADM

## 2025-08-22 RX ORDER — SODIUM CHLORIDE, SODIUM LACTATE, POTASSIUM CHLORIDE, CALCIUM CHLORIDE 600; 310; 30; 20 MG/100ML; MG/100ML; MG/100ML; MG/100ML
INJECTION, SOLUTION INTRAVENOUS CONTINUOUS PRN
Status: DISCONTINUED | OUTPATIENT
Start: 2025-08-22 | End: 2025-08-22 | Stop reason: HOSPADM

## 2025-08-22 RX ORDER — CALCIUM CARBONATE 500 MG/1
1000 TABLET, CHEWABLE ORAL EVERY 6 HOURS PRN
Status: DISCONTINUED | OUTPATIENT
Start: 2025-08-22 | End: 2025-08-24 | Stop reason: HOSPADM

## 2025-08-22 RX ORDER — NALOXONE HYDROCHLORIDE 0.4 MG/ML
0.4 INJECTION, SOLUTION INTRAMUSCULAR; INTRAVENOUS; SUBCUTANEOUS
Status: DISCONTINUED | OUTPATIENT
Start: 2025-08-22 | End: 2025-08-24 | Stop reason: HOSPADM

## 2025-08-22 RX ORDER — HYDROCORTISONE 25 MG/G
CREAM TOPICAL 3 TIMES DAILY PRN
Status: DISCONTINUED | OUTPATIENT
Start: 2025-08-22 | End: 2025-08-24 | Stop reason: HOSPADM

## 2025-08-22 RX ORDER — SODIUM CHLORIDE, SODIUM LACTATE, POTASSIUM CHLORIDE, CALCIUM CHLORIDE 600; 310; 30; 20 MG/100ML; MG/100ML; MG/100ML; MG/100ML
INJECTION, SOLUTION INTRAVENOUS CONTINUOUS
Status: DISCONTINUED | OUTPATIENT
Start: 2025-08-22 | End: 2025-08-24 | Stop reason: HOSPADM

## 2025-08-22 RX ORDER — NALOXONE HYDROCHLORIDE 0.4 MG/ML
0.2 INJECTION, SOLUTION INTRAMUSCULAR; INTRAVENOUS; SUBCUTANEOUS
Status: DISCONTINUED | OUTPATIENT
Start: 2025-08-22 | End: 2025-08-24 | Stop reason: HOSPADM

## 2025-08-22 RX ORDER — SIMETHICONE 80 MG
80 TABLET,CHEWABLE ORAL EVERY 6 HOURS PRN
Status: DISCONTINUED | OUTPATIENT
Start: 2025-08-22 | End: 2025-08-24 | Stop reason: HOSPADM

## 2025-08-22 RX ORDER — FENTANYL CITRATE-0.9 % NACL/PF 10 MCG/ML
100 PLASTIC BAG, INJECTION (ML) INTRAVENOUS EVERY 5 MIN PRN
Status: DISCONTINUED | OUTPATIENT
Start: 2025-08-22 | End: 2025-08-24 | Stop reason: HOSPADM

## 2025-08-22 RX ORDER — POLYETHYLENE GLYCOL 3350 17 G/17G
17 POWDER, FOR SOLUTION ORAL DAILY PRN
Status: DISCONTINUED | OUTPATIENT
Start: 2025-08-22 | End: 2025-08-24 | Stop reason: HOSPADM

## 2025-08-22 RX ORDER — FENTANYL CITRATE-0.9 % NACL/PF 10 MCG/ML
PLASTIC BAG, INJECTION (ML) INTRAVENOUS
Status: COMPLETED
Start: 2025-08-22 | End: 2025-08-22

## 2025-08-22 RX ORDER — BISACODYL 10 MG
10 SUPPOSITORY, RECTAL RECTAL DAILY PRN
Status: DISCONTINUED | OUTPATIENT
Start: 2025-08-22 | End: 2025-08-24 | Stop reason: HOSPADM

## 2025-08-22 RX ORDER — LIDOCAINE 40 MG/G
CREAM TOPICAL
Status: DISCONTINUED | OUTPATIENT
Start: 2025-08-22 | End: 2025-08-22 | Stop reason: HOSPADM

## 2025-08-22 RX ORDER — EPHEDRINE SULFATE 50 MG/ML
5 INJECTION, SOLUTION INTRAMUSCULAR; INTRAVENOUS; SUBCUTANEOUS
Status: DISCONTINUED | OUTPATIENT
Start: 2025-08-22 | End: 2025-08-22 | Stop reason: HOSPADM

## 2025-08-22 RX ORDER — AMOXICILLIN 250 MG
2 CAPSULE ORAL
Status: DISCONTINUED | OUTPATIENT
Start: 2025-08-22 | End: 2025-08-24 | Stop reason: HOSPADM

## 2025-08-22 RX ORDER — LOPERAMIDE HYDROCHLORIDE 2 MG/1
2 CAPSULE ORAL
Status: DISCONTINUED | OUTPATIENT
Start: 2025-08-22 | End: 2025-08-24 | Stop reason: HOSPADM

## 2025-08-22 RX ORDER — NALBUPHINE HYDROCHLORIDE 10 MG/ML
2.5-5 INJECTION INTRAMUSCULAR; INTRAVENOUS; SUBCUTANEOUS EVERY 6 HOURS PRN
Status: DISCONTINUED | OUTPATIENT
Start: 2025-08-22 | End: 2025-08-24 | Stop reason: HOSPADM

## 2025-08-22 RX ORDER — LOPERAMIDE HYDROCHLORIDE 2 MG/1
4 CAPSULE ORAL
Status: DISCONTINUED | OUTPATIENT
Start: 2025-08-22 | End: 2025-08-24 | Stop reason: HOSPADM

## 2025-08-22 RX ORDER — ROPIVACAINE HYDROCHLORIDE 2 MG/ML
10 INJECTION, SOLUTION EPIDURAL; INFILTRATION; PERINEURAL ONCE
Status: DISCONTINUED | OUTPATIENT
Start: 2025-08-22 | End: 2025-08-22 | Stop reason: HOSPADM

## 2025-08-22 RX ORDER — MISOPROSTOL 200 UG/1
400 TABLET ORAL
Status: DISCONTINUED | OUTPATIENT
Start: 2025-08-22 | End: 2025-08-24 | Stop reason: HOSPADM

## 2025-08-22 RX ORDER — CARBOPROST TROMETHAMINE 250 UG/ML
250 INJECTION, SOLUTION INTRAMUSCULAR
Status: DISCONTINUED | OUTPATIENT
Start: 2025-08-22 | End: 2025-08-24 | Stop reason: HOSPADM

## 2025-08-22 RX ORDER — DIPHENHYDRAMINE HCL 25 MG
25 CAPSULE ORAL EVERY 6 HOURS PRN
Status: DISCONTINUED | OUTPATIENT
Start: 2025-08-22 | End: 2025-08-24 | Stop reason: HOSPADM

## 2025-08-22 RX ORDER — MISOPROSTOL 200 UG/1
800 TABLET ORAL
Status: DISCONTINUED | OUTPATIENT
Start: 2025-08-22 | End: 2025-08-24 | Stop reason: HOSPADM

## 2025-08-22 RX ORDER — ROPIVACAINE HYDROCHLORIDE 2 MG/ML
INJECTION, SOLUTION EPIDURAL; INFILTRATION; PERINEURAL
Status: COMPLETED | OUTPATIENT
Start: 2025-08-22 | End: 2025-08-22

## 2025-08-22 RX ORDER — DIPHENHYDRAMINE HYDROCHLORIDE 50 MG/ML
25 INJECTION, SOLUTION INTRAMUSCULAR; INTRAVENOUS EVERY 6 HOURS PRN
Status: DISCONTINUED | OUTPATIENT
Start: 2025-08-22 | End: 2025-08-24 | Stop reason: HOSPADM

## 2025-08-22 RX ORDER — FENTANYL CITRATE-0.9 % NACL/PF 10 MCG/ML
100 PLASTIC BAG, INJECTION (ML) INTRAVENOUS
Status: DISCONTINUED | OUTPATIENT
Start: 2025-08-22 | End: 2025-08-22

## 2025-08-22 RX ORDER — OXYTOCIN 10 [USP'U]/ML
10 INJECTION, SOLUTION INTRAMUSCULAR; INTRAVENOUS
Status: DISCONTINUED | OUTPATIENT
Start: 2025-08-22 | End: 2025-08-24 | Stop reason: HOSPADM

## 2025-08-22 RX ORDER — OXYTOCIN/0.9 % SODIUM CHLORIDE 30/500 ML
1-24 PLASTIC BAG, INJECTION (ML) INTRAVENOUS CONTINUOUS
Status: DISCONTINUED | OUTPATIENT
Start: 2025-08-22 | End: 2025-08-22 | Stop reason: HOSPADM

## 2025-08-22 RX ORDER — METHYLERGONOVINE MALEATE 0.2 MG/ML
200 INJECTION INTRAVENOUS
Status: DISCONTINUED | OUTPATIENT
Start: 2025-08-22 | End: 2025-08-24 | Stop reason: HOSPADM

## 2025-08-22 RX ORDER — ACETAMINOPHEN 325 MG/1
650 TABLET ORAL EVERY 4 HOURS PRN
Status: DISCONTINUED | OUTPATIENT
Start: 2025-08-22 | End: 2025-08-24 | Stop reason: HOSPADM

## 2025-08-22 RX ORDER — TRANEXAMIC ACID 10 MG/ML
1 INJECTION, SOLUTION INTRAVENOUS EVERY 30 MIN PRN
Status: DISCONTINUED | OUTPATIENT
Start: 2025-08-22 | End: 2025-08-24 | Stop reason: HOSPADM

## 2025-08-22 RX ORDER — ENOXAPARIN SODIUM 100 MG/ML
40 INJECTION SUBCUTANEOUS EVERY 24 HOURS
Status: DISCONTINUED | OUTPATIENT
Start: 2025-08-23 | End: 2025-08-24 | Stop reason: HOSPADM

## 2025-08-22 RX ADMIN — ESCITALOPRAM OXALATE 10 MG: 10 TABLET ORAL at 23:49

## 2025-08-22 RX ADMIN — MISOPROSTOL 25 MCG: 100 TABLET ORAL at 04:36

## 2025-08-22 RX ADMIN — EPHEDRINE SULFATE 5 MG: 5 INJECTION INTRAVENOUS at 09:40

## 2025-08-22 RX ADMIN — Medication 100 MCG: at 09:56

## 2025-08-22 RX ADMIN — Medication: at 09:29

## 2025-08-22 RX ADMIN — EPHEDRINE SULFATE 5 MG: 5 INJECTION INTRAVENOUS at 09:49

## 2025-08-22 RX ADMIN — HYDROXYZINE HYDROCHLORIDE 50 MG: 25 TABLET ORAL at 00:31

## 2025-08-22 RX ADMIN — Medication: at 15:28

## 2025-08-22 RX ADMIN — Medication 2 MILLI-UNITS/MIN: at 11:19

## 2025-08-22 RX ADMIN — EPHEDRINE SULFATE 5 MG: 5 INJECTION INTRAVENOUS at 09:37

## 2025-08-22 RX ADMIN — SODIUM CHLORIDE, SODIUM LACTATE, POTASSIUM CHLORIDE, AND CALCIUM CHLORIDE 500 ML: .6; .31; .03; .02 INJECTION, SOLUTION INTRAVENOUS at 09:15

## 2025-08-22 RX ADMIN — ACETAMINOPHEN 650 MG: 325 TABLET ORAL at 21:15

## 2025-08-22 RX ADMIN — EPHEDRINE SULFATE 5 MG: 5 INJECTION INTRAVENOUS at 09:43

## 2025-08-22 RX ADMIN — ROPIVACAINE HYDROCHLORIDE 10 ML: 2 INJECTION, SOLUTION EPIDURAL; INFILTRATION at 09:22

## 2025-08-22 RX ADMIN — IBUPROFEN 800 MG: 400 TABLET ORAL at 21:15

## 2025-08-22 RX ADMIN — SODIUM CHLORIDE, SODIUM LACTATE, POTASSIUM CHLORIDE, AND CALCIUM CHLORIDE: .6; .31; .03; .02 INJECTION, SOLUTION INTRAVENOUS at 10:21

## 2025-08-22 RX ADMIN — MISOPROSTOL 25 MCG: 100 TABLET ORAL at 00:31

## 2025-08-22 RX ADMIN — Medication 100 MCG: at 10:07

## 2025-08-22 RX ADMIN — EPHEDRINE SULFATE 5 MG: 5 INJECTION INTRAVENOUS at 09:46

## 2025-08-22 ASSESSMENT — ACTIVITIES OF DAILY LIVING (ADL)
ADLS_ACUITY_SCORE: 36
ADLS_ACUITY_SCORE: 41
ADLS_ACUITY_SCORE: 36
ADLS_ACUITY_SCORE: 40
ADLS_ACUITY_SCORE: 36
ADLS_ACUITY_SCORE: 36
ADLS_ACUITY_SCORE: 40
ADLS_ACUITY_SCORE: 41
ADLS_ACUITY_SCORE: 36
ADLS_ACUITY_SCORE: 41
ADLS_ACUITY_SCORE: 41
ADLS_ACUITY_SCORE: 36
ADLS_ACUITY_SCORE: 41
ADLS_ACUITY_SCORE: 36
ADLS_ACUITY_SCORE: 41
ADLS_ACUITY_SCORE: 41

## 2025-08-23 PROBLEM — Z34.90 ENCOUNTER FOR INDUCTION OF LABOR: Status: RESOLVED | Noted: 2025-08-21 | Resolved: 2025-08-23

## 2025-08-23 PROCEDURE — 250N000011 HC RX IP 250 OP 636: Performed by: STUDENT IN AN ORGANIZED HEALTH CARE EDUCATION/TRAINING PROGRAM

## 2025-08-23 PROCEDURE — 120N000012 HC R&B POSTPARTUM

## 2025-08-23 PROCEDURE — 250N000013 HC RX MED GY IP 250 OP 250 PS 637: Performed by: STUDENT IN AN ORGANIZED HEALTH CARE EDUCATION/TRAINING PROGRAM

## 2025-08-23 RX ORDER — HYDROXYZINE HYDROCHLORIDE 25 MG/1
25 TABLET, FILM COATED ORAL EVERY 6 HOURS PRN
Status: DISCONTINUED | OUTPATIENT
Start: 2025-08-23 | End: 2025-08-24 | Stop reason: HOSPADM

## 2025-08-23 RX ORDER — HYDROXYZINE HYDROCHLORIDE 25 MG/1
50 TABLET, FILM COATED ORAL EVERY 6 HOURS PRN
Status: DISCONTINUED | OUTPATIENT
Start: 2025-08-23 | End: 2025-08-24 | Stop reason: HOSPADM

## 2025-08-23 RX ADMIN — BENZOCAINE: 11.4 AEROSOL, SPRAY TOPICAL at 00:12

## 2025-08-23 RX ADMIN — ACETAMINOPHEN 650 MG: 325 TABLET ORAL at 13:09

## 2025-08-23 RX ADMIN — HYDROXYZINE HYDROCHLORIDE 25 MG: 25 TABLET, FILM COATED ORAL at 23:07

## 2025-08-23 RX ADMIN — IBUPROFEN 800 MG: 400 TABLET ORAL at 13:09

## 2025-08-23 RX ADMIN — ESCITALOPRAM OXALATE 10 MG: 10 TABLET ORAL at 22:34

## 2025-08-23 RX ADMIN — ACETAMINOPHEN 650 MG: 325 TABLET ORAL at 22:34

## 2025-08-23 RX ADMIN — ENOXAPARIN SODIUM 40 MG: 40 INJECTION SUBCUTANEOUS at 08:49

## 2025-08-23 RX ADMIN — IBUPROFEN 800 MG: 400 TABLET ORAL at 22:34

## 2025-08-23 RX ADMIN — HYDROXYZINE HYDROCHLORIDE 50 MG: 25 TABLET, FILM COATED ORAL at 00:12

## 2025-08-23 RX ADMIN — IBUPROFEN 800 MG: 400 TABLET ORAL at 03:48

## 2025-08-23 RX ADMIN — METHYLCELLULOSE 1000 MG: 500 TABLET ORAL at 08:49

## 2025-08-23 RX ADMIN — ACETAMINOPHEN 650 MG: 325 TABLET ORAL at 03:48

## 2025-08-23 ASSESSMENT — ACTIVITIES OF DAILY LIVING (ADL)
ADLS_ACUITY_SCORE: 40
ADLS_ACUITY_SCORE: 40
ADLS_ACUITY_SCORE: 35
ADLS_ACUITY_SCORE: 35
ADLS_ACUITY_SCORE: 40
ADLS_ACUITY_SCORE: 35
ADLS_ACUITY_SCORE: 40
ADLS_ACUITY_SCORE: 35
ADLS_ACUITY_SCORE: 35
ADLS_ACUITY_SCORE: 40

## 2025-08-24 VITALS
OXYGEN SATURATION: 99 % | WEIGHT: 220 LBS | BODY MASS INDEX: 34.53 KG/M2 | HEIGHT: 67 IN | DIASTOLIC BLOOD PRESSURE: 89 MMHG | HEART RATE: 84 BPM | RESPIRATION RATE: 16 BRPM | SYSTOLIC BLOOD PRESSURE: 126 MMHG | TEMPERATURE: 97.4 F

## 2025-08-24 PROCEDURE — 250N000011 HC RX IP 250 OP 636: Performed by: STUDENT IN AN ORGANIZED HEALTH CARE EDUCATION/TRAINING PROGRAM

## 2025-08-24 PROCEDURE — 250N000013 HC RX MED GY IP 250 OP 250 PS 637: Performed by: STUDENT IN AN ORGANIZED HEALTH CARE EDUCATION/TRAINING PROGRAM

## 2025-08-24 RX ADMIN — IBUPROFEN 800 MG: 400 TABLET ORAL at 04:27

## 2025-08-24 RX ADMIN — ENOXAPARIN SODIUM 40 MG: 40 INJECTION SUBCUTANEOUS at 08:37

## 2025-08-24 RX ADMIN — METHYLCELLULOSE 1000 MG: 500 TABLET ORAL at 08:37

## 2025-08-24 RX ADMIN — ACETAMINOPHEN 650 MG: 325 TABLET ORAL at 08:37

## 2025-08-24 RX ADMIN — IBUPROFEN 800 MG: 400 TABLET ORAL at 11:02

## 2025-08-24 RX ADMIN — ACETAMINOPHEN 650 MG: 325 TABLET ORAL at 04:27

## 2025-08-24 ASSESSMENT — ACTIVITIES OF DAILY LIVING (ADL)
ADLS_ACUITY_SCORE: 35

## 2025-08-26 ENCOUNTER — PATIENT OUTREACH (OUTPATIENT)
Dept: CARE COORDINATION | Facility: CLINIC | Age: 35
End: 2025-08-26
Payer: COMMERCIAL

## (undated) DEVICE — BLADE KNIFE SURG 15 371115

## (undated) DEVICE — DRSG STERI STRIP 1/2X4" R1547

## (undated) DEVICE — Device

## (undated) DEVICE — SU ETHILON 3-0 FS-1 18" 669H

## (undated) DEVICE — SOL WATER IRRIG 1000ML BOTTLE 2F7114

## (undated) DEVICE — ESU GROUND PAD UNIVERSAL W/O CORD

## (undated) DEVICE — DRAPE STOCKINETTE IMPERVIOUS 12" 1587

## (undated) DEVICE — DRAPE IOBAN INCISE 23X17" 6650EZ

## (undated) DEVICE — CLIP APPLIER 11" MED LIGACLIP MCM20

## (undated) DEVICE — SOL NACL 0.9% IRRIG 1000ML BOTTLE 2F7124

## (undated) DEVICE — SU SILK 2-0 TIE 24" SA75H

## (undated) DEVICE — SU VICRYL 3-0 SH 27" J316H

## (undated) DEVICE — ESU ELEC BLADE 6" COATED E1450-6

## (undated) DEVICE — DRAIN JACKSON PRATT RESERVOIR 100ML SU130-1305

## (undated) DEVICE — LINEN TOWEL PACK X5 5464

## (undated) DEVICE — DECANTER BAG 2002S

## (undated) DEVICE — SU MONOCRYL 4-0 PS-2 18" UND Y496G

## (undated) DEVICE — PREP CHLORAPREP 26ML TINTED HI-LITE ORANGE 930815

## (undated) DEVICE — DRAIN JACKSON PRATT CHANNEL 19FR ROUND HUBLESS SIL JP-2230

## (undated) DEVICE — PACK MAJOR SBA15MAFSI

## (undated) DEVICE — SU VICRYL 2-0 CT-1 27" J339H

## (undated) DEVICE — DRAPE LAP W/ARMBOARD 29410

## (undated) DEVICE — GLOVE BIOGEL PI ULTRATOUCH SZ 7.5 41175

## (undated) DEVICE — SU SILK 2-0 FSL 18" 677G

## (undated) RX ORDER — CHLOROPROCAINE HYDROCHLORIDE 30 MG/ML
INJECTION, SOLUTION EPIDURAL; INFILTRATION; INTRACAUDAL; PERINEURAL
Status: DISPENSED
Start: 2023-05-10

## (undated) RX ORDER — HYDROMORPHONE HYDROCHLORIDE 1 MG/ML
INJECTION, SOLUTION INTRAMUSCULAR; INTRAVENOUS; SUBCUTANEOUS
Status: DISPENSED
Start: 2024-04-26

## (undated) RX ORDER — FENTANYL CITRATE 0.05 MG/ML
INJECTION, SOLUTION INTRAMUSCULAR; INTRAVENOUS
Status: DISPENSED
Start: 2024-04-26

## (undated) RX ORDER — PROPOFOL 10 MG/ML
INJECTION, EMULSION INTRAVENOUS
Status: DISPENSED
Start: 2024-04-26

## (undated) RX ORDER — MORPHINE SULFATE 1 MG/ML
INJECTION, SOLUTION EPIDURAL; INTRATHECAL; INTRAVENOUS
Status: DISPENSED
Start: 2023-05-10

## (undated) RX ORDER — HEPARIN SODIUM 200 [USP'U]/100ML
INJECTION, SOLUTION INTRAVENOUS
Status: DISPENSED
Start: 2024-05-06

## (undated) RX ORDER — FENTANYL CITRATE 50 UG/ML
INJECTION, SOLUTION INTRAMUSCULAR; INTRAVENOUS
Status: DISPENSED
Start: 2024-05-06

## (undated) RX ORDER — OXYTOCIN/0.9 % SODIUM CHLORIDE 30/500 ML
PLASTIC BAG, INJECTION (ML) INTRAVENOUS
Status: DISPENSED
Start: 2023-05-10

## (undated) RX ORDER — CEFAZOLIN SODIUM 1 G/3ML
INJECTION, POWDER, FOR SOLUTION INTRAMUSCULAR; INTRAVENOUS
Status: DISPENSED
Start: 2023-05-10

## (undated) RX ORDER — ONDANSETRON 2 MG/ML
INJECTION INTRAMUSCULAR; INTRAVENOUS
Status: DISPENSED
Start: 2023-05-10

## (undated) RX ORDER — ONDANSETRON 2 MG/ML
INJECTION INTRAMUSCULAR; INTRAVENOUS
Status: DISPENSED
Start: 2024-04-26

## (undated) RX ORDER — DEXAMETHASONE SODIUM PHOSPHATE 4 MG/ML
INJECTION, SOLUTION INTRA-ARTICULAR; INTRALESIONAL; INTRAMUSCULAR; INTRAVENOUS; SOFT TISSUE
Status: DISPENSED
Start: 2024-04-26

## (undated) RX ORDER — FENTANYL CITRATE 50 UG/ML
INJECTION, SOLUTION INTRAMUSCULAR; INTRAVENOUS
Status: DISPENSED
Start: 2024-04-26

## (undated) RX ORDER — LIDOCAINE HYDROCHLORIDE 10 MG/ML
INJECTION, SOLUTION INFILTRATION; PERINEURAL
Status: DISPENSED
Start: 2024-05-06

## (undated) RX ORDER — PROPOFOL 10 MG/ML
INJECTION, EMULSION INTRAVENOUS
Status: DISPENSED
Start: 2023-05-10

## (undated) RX ORDER — KETOROLAC TROMETHAMINE 30 MG/ML
INJECTION, SOLUTION INTRAMUSCULAR; INTRAVENOUS
Status: DISPENSED
Start: 2024-04-26

## (undated) RX ORDER — CEFAZOLIN SODIUM/WATER 2 G/20 ML
SYRINGE (ML) INTRAVENOUS
Status: DISPENSED
Start: 2024-04-26